# Patient Record
Sex: FEMALE | Race: WHITE | NOT HISPANIC OR LATINO | Employment: FULL TIME | URBAN - METROPOLITAN AREA
[De-identification: names, ages, dates, MRNs, and addresses within clinical notes are randomized per-mention and may not be internally consistent; named-entity substitution may affect disease eponyms.]

---

## 2017-05-22 ENCOUNTER — GENERIC CONVERSION - ENCOUNTER (OUTPATIENT)
Dept: OTHER | Facility: OTHER | Age: 57
End: 2017-05-22

## 2017-08-08 ENCOUNTER — ALLSCRIPTS OFFICE VISIT (OUTPATIENT)
Dept: OTHER | Facility: OTHER | Age: 57
End: 2017-08-08

## 2017-08-08 DIAGNOSIS — R53.83 OTHER FATIGUE: ICD-10-CM

## 2017-08-08 DIAGNOSIS — E78.5 HYPERLIPIDEMIA: ICD-10-CM

## 2018-01-09 NOTE — PROGRESS NOTES
Assessment   1  Allergic rhinitis (477 9) (J30 9)  2  Auditory impairment, bilateral (389 9) (H91 93)  3  Hyperlipidemia (272 4) (E78 5)  4  Fatigue (780 79) (R53 83)  5  Need for hepatitis C screening test (V73 89) (Z11 59)  6  Encounter for preventive health examination (V70 0) (Z00 00)1      1 Amended By: Marv Warner; Aug 08 2017 4:05 PM EST    Plan  Colon cancer screening    · COLONOSCOPY; Status:Complete - Retrospective By Protocol Authorization;   Done:  [id number] 12:00AM  Fatigue    · (1) CBC/PLT/DIFF; Status:Active; Requested for:73Drj5322;    · (1) TSH WITH FT4 REFLEX; Status:Active; Requested for:90Ras0997;   Hyperlipidemia    · (1) COMPREHENSIVE METABOLIC PANEL; Status:Active; Requested for:36Mzr8076;    · (1) LIPID PANEL, FASTING; Status:Active; Requested for:92Nus6667;   Need for hepatitis C screening test    · (Q) HEPATITIS C ANTIBODY; Status:Active; Requested for:27Tiy9312;     Discussion/Summary  health maintenance visit Currently, she eats a healthy diet and has an adequate exercise regimen  cervical cancer screening is current Breast cancer screening: mammogram is current  Colorectal cancer screening: colorectal cancer screening is current  Advice and education were given regarding nutrition and aerobic exercise  Hepatitis C Screening:  The patient agrees to Hepatitis C screening  Dolores Cotto is here for health maintenance visit  She has been doing very well in spite of multiple stresses in her life over the past 2 years  She manages a healthy diet and does some walking for exercise  We encouraged increased exercise and addition of muscle strengthening and flexibility  Regarding allergies, she continues with allergy injections and uses Allegra and Flonase  She has some hearing loss and was diagnosed with low frequency hearing loss a few years ago   She will let us know if she would like a referral for additional hearing test   I have given her a lab slip for cholesterol panel and comprehensive metabolic profile as well as CBC and TSH  We will also check a hep C screen  We'll plan continued yearly visits  Chief Complaint  YEARLY PHYSICAL      History of Present Illness  HM, Adult Female: The patient is being seen for a health maintenance evaluation  The last health maintenance visit was several year(s) ago  General Health: The patient's health since the last visit is described as good  She has regular dental visits  She denies vision problems  She has hearing loss  Immunizations status:  notes decreased  Lifestyle:  She consumes a diverse and healthy diet  She has weight concerns  She exercises regularly  She does not use tobacco  She denies alcohol use  She denies drug use  Reproductive health: the patient is postmenopausal    Screening:   HPI: Pt is here for physical exam   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  She had to fire employees due to [place]  She feels exhausted  last night she was watching her daughters dogs who had overdosed on vitamin D  Her 25 yr old daughter had major scoliosis surgery 2 yrs ago and she cared for her as well  Then she got very sick with virus and needed a feeding tube  allergy shots q 4 weeks  Review of Systems    Constitutional: No fever, no chills, feels well, no tiredness, no recent weight gain or weight loss  Eyes: No complaints of eye pain, no red eyes, no eyesight problems, no discharge, no dry eyes, no itching of eyes  ENT: no complaints of earache, no loss of hearing, no nose bleeds, no nasal discharge, no sore throat, no hoarseness  Cardiovascular: No complaints of slow heart rate, no fast heart rate, no chest pain, no palpitations, no leg claudication, no lower extremity edema  Respiratory: No complaints of shortness of breath, no wheezing, no cough, no SOB on exertion, no orthopnea, no PND     Gastrointestinal: No complaints of abdominal pain, no constipation, no nausea or vomiting, no diarrhea, no bloody stools  Musculoskeletal: No complaints of arthralgias, no myalgias, no joint swelling or stiffness, no limb pain or swelling  Integumentary: No complaints of skin rash or lesions, no itching, no skin wounds, no breast pain or lump  Neurological: No complaints of headache, no confusion, no convulsions, no numbness, no dizziness or fainting, no tingling, no limb weakness, no difficulty walking  Psychiatric: Not suicidal, no sleep disturbance, no anxiety or depression, no change in personality, no emotional problems  Endocrine: No complaints of proptosis, no hot flashes, no muscle weakness, no deepening of the voice, no feelings of weakness  Active Problems   1  Allergic rhinitis (477 9) (J30 9)  2  Auditory impairment, bilateral (389 9) (H91 93)  3  Chronic constipation (564 00) (K59 09)  4  Dysphagia (787 20) (R13 10)  5  Fatigue (780 79) (R53 83)  6  Hyperlipidemia (272 4) (E78 5)  7  Nephrolithiasis (592 0) (N20 0)  8  Overweight (278 02) (E66 3)  9  Posterior tibial tendonitis (726 72) (M76 829)  10  Preoperative clearance (V72 84) (Z01 818)    Past Medical History    · History of sinusitis (V12 69) (Z87 09)   · History of urinary tract infection (V13 02) (Z87 440)   · History of vertigo (V12 49) (Z87 898)   · History of viral infection (V12 09) (Z86 19)   · History of Left arm pain (729 5) (M79 602)    Surgical History    · History of Hysterectomy   · History of Total Abdominal Hysterectomy With Removal Of Both Ovaries    Family History  Mother    · Family history of Multiple myeloma  Father    · Family history of Diabetes mellitus   · Family history of Melanoma   · Family history of Prostate cancer  Brother    · Family history of Diabetes mellitus   · Family history of HTN (hypertension)    Social History    · Being A Social Drinker   · Never A Smoker   · No drug use    Current Meds  1   Allegra-D Allergy & Congestion  MG Oral Tablet Extended Release 12 Hour; TAKE   1 TABLET EVERY MORNING; Therapy: 84BJK0861 to (Evaluate:05Jun2013) Recorded  2  Fluticasone Propionate 50 MCG/ACT Nasal Suspension; USE 2 SPRAYS IN EACH   NOSTRIL ONCE DAILY; Therapy: 45AMA5213 to (Last Rx:12Mar2012)  Requested for: 12Mar2012 Ordered  3  Menostar 14 MCG/24HR Transdermal Patch Weekly; Therapy: 11LVX2255 to (Last Rx:09Jun2011)  Requested for: 84NEP5570 Ordered  4  ZyrTEC Allergy 10 MG Oral Tablet; TAKE 1 TABLET AT BEDTIME; Therapy: 95YWO6894 to (Evaluate:06Apr2013); Last Rx:07Mar2013 Ordered    Allergies   1  Sulfa Drugs    Vitals   Recorded: 23NUU8058 24:11QZ   Systolic 407   Diastolic 72   Height 5 ft 7 in   Weight 170 lb 4 oz   BMI Calculated 26 66   BSA Calculated 1 89     Physical Exam    Constitutional   General appearance: No acute distress, well appearing and well nourished  Head and Face   Head and face: Normal     Palpation of the face and sinuses: No sinus tenderness  Eyes   Conjunctiva and lids: No swelling, erythema or discharge  Pupils and irises: Equal, round, reactive to light  Ears, Nose, Mouth, and Throat   External inspection of ears and nose: Normal     Otoscopic examination: Tympanic membranes translucent with normal light reflex  Canals patent without erythema  Hearing: Normal     Lips, teeth, and gums: Normal, good dentition  Oropharynx: Normal with no erythema, edema, exudate or lesions  Neck   Neck: Supple, symmetric, trachea midline, no masses  Thyroid: Normal, no thyromegaly  Pulmonary   Respiratory effort: No increased work of breathing or signs of respiratory distress  Auscultation of lungs: Clear to auscultation  Cardiovascular   Auscultation of heart: Normal rate and rhythm, normal S1 and S2, no murmurs  Carotid pulses: 2+ bilaterally  Examination of extremities for edema and/or varicosities: Normal     Abdomen   Abdomen: Non-tender, no masses  Liver and spleen: No hepatomegaly or splenomegaly     Lymphatic   Palpation of lymph nodes in neck: No lymphadenopathy  Palpation of lymph nodes in other areas: No lymphadenopathy  Musculoskeletal   Gait and station: Normal     Digits and nails: Normal without clubbing or cyanosis  Skin   Skin and subcutaneous tissue: Normal without rashes or lesions  Neurologic   Cranial nerves: Cranial nerves II-XII intact  Reflexes: 2+ and symmetric  Results/Data  PHQ-2 Adult Depression Screening 40Hoo6619 09:36AM User, Ahs     Test Name Result Flag Reference   PHQ-2 Adult Depression Score 0     Over the last two weeks, how often have you been bothered by any of the following problems?   Little interest or pleasure in doing things: Not at all - 0  Feeling down, depressed, or hopeless: Not at all - 0   PHQ-2 Adult Depression Screening Negative       COLONOSCOPY [id number] 12:00AM Susan Marley     Test Name Result Flag Reference   Colonoscopy 02/21/2014 normal       Summary / No summary entered :      No summary entered  Documents attached :      sColonoscopies - Brandie Couch; Enc: [id number] - Image Encounter - [facility]) (Result Document)    Future Appointments    Date/Time Provider Specialty Site   09/06/2018 09:20 AM Susan Marley MD Family Medicine [facility]   Electronically signed by : Melodie Miguel MD; Aug  8 2017  4:05PM EST                       (Author)

## 2018-01-14 VITALS
SYSTOLIC BLOOD PRESSURE: 136 MMHG | DIASTOLIC BLOOD PRESSURE: 72 MMHG | HEIGHT: 67 IN | BODY MASS INDEX: 26.72 KG/M2 | WEIGHT: 170.25 LBS

## 2018-06-19 RX ORDER — FEXOFENADINE HCL AND PSEUDOEPHEDRINE HCI 60; 120 MG/1; MG/1
1 TABLET, EXTENDED RELEASE ORAL DAILY
COMMUNITY
Start: 2013-03-07

## 2018-06-19 RX ORDER — FLUTICASONE PROPIONATE 50 MCG
2 SPRAY, SUSPENSION (ML) NASAL DAILY
COMMUNITY
Start: 2012-03-12

## 2018-09-06 ENCOUNTER — OFFICE VISIT (OUTPATIENT)
Dept: FAMILY MEDICINE CLINIC | Facility: CLINIC | Age: 58
End: 2018-09-06
Payer: COMMERCIAL

## 2018-09-06 VITALS
WEIGHT: 160 LBS | TEMPERATURE: 97.6 F | HEIGHT: 67 IN | SYSTOLIC BLOOD PRESSURE: 114 MMHG | DIASTOLIC BLOOD PRESSURE: 78 MMHG | BODY MASS INDEX: 25.11 KG/M2 | OXYGEN SATURATION: 99 % | HEART RATE: 76 BPM

## 2018-09-06 DIAGNOSIS — Z13.220 SCREENING FOR HYPERLIPIDEMIA: ICD-10-CM

## 2018-09-06 DIAGNOSIS — M25.551 HIP PAIN, ACUTE, RIGHT: ICD-10-CM

## 2018-09-06 DIAGNOSIS — Z23 NEED FOR VACCINATION FOR H FLU TYPE B: ICD-10-CM

## 2018-09-06 DIAGNOSIS — Z00.00 ENCOUNTER FOR HEALTH MAINTENANCE EXAMINATION IN ADULT: Primary | ICD-10-CM

## 2018-09-06 DIAGNOSIS — H91.93 AUDITORY IMPAIRMENT, BILATERAL: ICD-10-CM

## 2018-09-06 PROCEDURE — 90682 RIV4 VACC RECOMBINANT DNA IM: CPT

## 2018-09-06 PROCEDURE — 99396 PREV VISIT EST AGE 40-64: CPT | Performed by: FAMILY MEDICINE

## 2018-09-06 NOTE — PROGRESS NOTES
Assessment/Plan:    Normal  physical exam   She is up-to-date with health screening measures  We will check fasting lipid profile and CMP  She has had some right low back and hip pain over the past 2 weeks which appears to be musculoskeletal   Recommended gentle stretching and she should contact us if symptoms are not improving  Auditory impairment, bilateral  Gave ENT referral       Diagnoses and all orders for this visit:    Encounter for health maintenance examination in adult    Auditory impairment, bilateral  -     Ambulatory Referral to Otolaryngology; Future    Screening for hyperlipidemia  -     Lipid panel; Future  -     Comprehensive metabolic panel; Future    Hip pain, acute, right    Need for vaccination for H flu type B  -     influenza vaccine, [id number], quadrivalent, recombinant, PF, 0 5 mL, for patients 18 yr+ (FLUBLOK)          Subjective:      Patient ID: Tank Mixon is a 66 y o  female  She is here for  physical exam   She is eating a healthy diet, normal bowels  Weight is the same as 2016  She had gained some weight but then lost it again as she increased her exercise  Walking for exercise  Notes right low back/hip pain past 2 weeks  She denies injury but she helped her daughter move past weekend and she thinks she aggravated it  UTD with colonoscopy 5 years ago  Gyn and mamm was done in May  UTD with dentist  Allergies are under control  She gets monthly allergy shots  Her father  January of met prostate ca        The following portions of the patient's history were reviewed and updated as appropriate: allergies, current medications, past family history, past medical history, past social history, past surgical history and problem list     Review of Systems   Constitutional: Negative for activity change, chills, fatigue and fever  HENT: Negative for congestion, ear pain, sinus pressure and sore throat  Eyes: Negative for pain and visual disturbance  Respiratory: Negative for cough, chest tightness, shortness of breath and wheezing  Cardiovascular: Negative for chest pain, palpitations and leg swelling  Gastrointestinal: Negative for abdominal pain, blood in stool, constipation, diarrhea, nausea and vomiting  Endocrine: Negative for polydipsia and polyuria  Genitourinary: Negative for difficulty urinating, dysuria, frequency and urgency  Musculoskeletal: Positive for arthralgias  Negative for joint swelling and myalgias  Skin: Negative for rash  Neurological: Negative for dizziness, weakness, numbness and headaches  Hematological: Negative for adenopathy  Does not bruise/bleed easily  Psychiatric/Behavioral: Negative for dysphoric mood  The patient is not nervous/anxious  Objective:      /78 (BP Location: Left arm, Patient Position: Sitting, Cuff Size: Standard)   Pulse 76   Temp 97 6 °F (36 4 °C) (Tympanic)   Ht 5' 6 5" (1 689 m)   Wt 72 6 kg (160 lb)   SpO2 99%   BMI 25 44 kg/m²          Physical Exam   Nursing note and vitals reviewed

## 2018-12-27 DIAGNOSIS — J01.00 ACUTE NON-RECURRENT MAXILLARY SINUSITIS: Primary | ICD-10-CM

## 2018-12-27 RX ORDER — AMOXICILLIN AND CLAVULANATE POTASSIUM 875; 125 MG/1; MG/1
1 TABLET, FILM COATED ORAL EVERY 12 HOURS SCHEDULED
Qty: 20 TABLET | Refills: 0
Start: 2018-12-27 | End: 2019-01-06

## 2018-12-27 NOTE — TELEPHONE ENCOUNTER
Patient is calling today from [place]  She is suffering from a really bad sinus infection - she has a sore throat, congestion which is causing pressure in her ears, drainage, cough, no fever tho  She went to a walk-in urgent care in [place] and they gave her 500mg of Amoxicillian 3x a day, she said it helped but it did not get rid of it she is still suffering and wanted to know if you would be able to call in another antibiotic for her  If so, can you please send to to the [facility] on [address] [address] [phone]

## 2018-12-27 NOTE — TELEPHONE ENCOUNTER
Augmentin 875 BID x 10 days #20 no refills  Please let her know that amox is a good choice but may take time - augmentin is a bit stronger

## 2019-07-29 LAB — HBA1C MFR BLD HPLC: 5.2 %

## 2019-08-20 LAB
LEFT EYE DIABETIC RETINOPATHY: NORMAL
RIGHT EYE DIABETIC RETINOPATHY: NORMAL

## 2019-08-22 ENCOUNTER — TELEPHONE (OUTPATIENT)
Dept: FAMILY MEDICINE CLINIC | Facility: CLINIC | Age: 59
End: 2019-08-22

## 2019-08-22 NOTE — TELEPHONE ENCOUNTER
Pt called asking if she needs labs done before appt on 9/10/19    Pt also has concerns that she may be prediabetic  She recently had a BS test, 3 hour BS test, where she claims her results were as follows:   after 1 hour         240 after 2 hours        140 after 3 hours  Pt has Hx of diabetes in family, dad and two brothers have diabetes  Pt has changed diet and is exercising more as well, and has lost 21 lbs

## 2019-09-09 LAB
HEPATITIS C ANTIBODY (HISTORICAL): NEGATIVE
HEPATITIS C ANTIBODY CONFIRMATION (HISTORICAL): NEGATIVE

## 2019-09-10 ENCOUNTER — OFFICE VISIT (OUTPATIENT)
Dept: FAMILY MEDICINE CLINIC | Facility: CLINIC | Age: 59
End: 2019-09-10
Payer: COMMERCIAL

## 2019-09-10 VITALS
BODY MASS INDEX: 22.6 KG/M2 | HEIGHT: 67 IN | DIASTOLIC BLOOD PRESSURE: 78 MMHG | OXYGEN SATURATION: 97 % | TEMPERATURE: 97.3 F | HEART RATE: 92 BPM | WEIGHT: 144 LBS | SYSTOLIC BLOOD PRESSURE: 120 MMHG

## 2019-09-10 DIAGNOSIS — Z00.00 ENCOUNTER FOR HEALTH MAINTENANCE EXAMINATION IN ADULT: ICD-10-CM

## 2019-09-10 DIAGNOSIS — J30.9 ALLERGIC RHINITIS, UNSPECIFIED SEASONALITY, UNSPECIFIED TRIGGER: ICD-10-CM

## 2019-09-10 DIAGNOSIS — Z00.00 ANNUAL PHYSICAL EXAM: Primary | ICD-10-CM

## 2019-09-10 PROCEDURE — 99396 PREV VISIT EST AGE 40-64: CPT | Performed by: FAMILY MEDICINE

## 2019-09-10 NOTE — PATIENT INSTRUCTIONS

## 2019-09-10 NOTE — PROGRESS NOTES
ADULT [facility]AL [facility]    NAME: Chapincito Saldivar  AGE: 64 y o  SEX: female  : 1960     DATE: 9/10/2019     Assessment and Plan:     Problem List Items Addressed This Visit        Respiratory    Allergic rhinitis     Well controlled on current regimen           Other Visit Diagnoses     Encounter for health maintenance examination in adult    -  Primary          Immunizations and preventive care screenings were discussed with patient today  Appropriate education was printed on patient's after visit summary  Counseling:  · Dental Health: discussed importance of regular tooth brushing, flossing, and dental visits  No follow-ups on file  Chief Complaint:     Chief Complaint   Patient presents with    Annual Exam      History of Present Illness:     Adult Annual Physical   Patient here for a comprehensive physical exam  The patient reports problems - She has strong family history of diabetes  Thankfully her hemoglobin A1c was normal at 5 2 because she made massive improvements with her diet and exercise routine  She also lost 16 lb  She was having some vision problems on the left  Her eye doctor did diabetic retinal exam and everything is stable  She will continue to monitor       Diet and Physical Activity  · Diet/Nutrition: well balanced diet, consuming 3-5 servings of fruits/vegetables daily and adequate fiber intake  · Exercise: walking and 5-7 times a week on average  Depression Screening  PHQ-9 Depression Screening    PHQ-9:    Frequency of the following problems over the past two weeks:       Little interest or pleasure in doing things:  0 - not at all  Feeling down, depressed, or hopeless:  0 - not at all  PHQ-2 Score:  0       General Health  · Sleep: sleeps well and gets 4-6 hours of sleep on average     · Hearing: normal - bilateral   · Vision: goes for regular eye exams, most recent eye exam <1 year ago and wears glasses  · Dental: regular dental visits, brushes teeth twice daily and flosses teeth occasionally  /GYN Health  · Patient is: postmenopausal  · Last menstrual period: 2006  · Contraceptive method: none  Review of Systems:     Review of Systems   Constitutional: Negative for activity change, chills, fatigue and fever  HENT: Positive for congestion  Negative for ear pain, sinus pressure and sore throat  Eyes: Negative for pain and visual disturbance  Respiratory: Negative for cough, chest tightness, shortness of breath and wheezing  Cardiovascular: Negative for chest pain, palpitations and leg swelling  Gastrointestinal: Negative for abdominal pain, blood in stool, constipation, diarrhea, nausea and vomiting  Endocrine: Negative for polydipsia and polyuria  Genitourinary: Positive for urgency  Negative for difficulty urinating, dysuria and frequency  Has urogyn appt, had uterine prolapse surgery 2006  With weight gain now weight loss, having some sx again  She is scheduled for follow up   Musculoskeletal: Negative for arthralgias, joint swelling and myalgias  Skin: Negative for rash  Allergic/Immunologic: Positive for environmental allergies  Neurological: Negative for dizziness, weakness, numbness and headaches  Hematological: Negative for adenopathy  Does not bruise/bleed easily  Psychiatric/Behavioral: Negative for dysphoric mood  The patient is not nervous/anxious         Past Medical History:     Past Medical History:   Diagnosis Date    Allergic rhinitis     Vertigo     LAST ASSESSED 3/8/13; RESOLVED 11/10/15      Past Surgical History:     Past Surgical History:   Procedure Laterality Date    ABDOMINAL SURGERY      HYSTERECTOMY  2006    TOTAL ABDOMINAL HYSTERECTOMY  2006    WITH REMOVAL OF BOTH OVARIES       Social History:     Social History     Socioeconomic History    Marital status: /Civil Union     Spouse name: None    Number of children: None    Years of education: None    Highest education level: None   Occupational History    None   Social Needs    Financial resource strain: None    Food insecurity:     Worry: None     Inability: None    Transportation needs:     Medical: None     Non-medical: None   Tobacco Use    Smoking status: Never Smoker    Smokeless tobacco: Never Used   Substance and Sexual Activity    Alcohol use: No     Comment: SOCIAL DRINKER PER [employer]    Drug use: No    Sexual activity: None   Lifestyle    Physical activity:     Days per week: None     Minutes per session: None    Stress: None   Relationships    Social connections:     Talks on phone: None     Gets together: None     Attends Hoahaoism service: None     Active member of club or organization: None     Attends meetings of clubs or organizations: None     Relationship status: None    Intimate partner violence:     Fear of current or ex partner: None     Emotionally abused: None     Physically abused: None     Forced sexual activity: None   Other Topics Concern    None   Social History Narrative    None      Family History:     Family History   Problem Relation Age of Onset    Other Mother         MULTIPLE MYELOMA    Diabetes Father     Melanoma Father     Prostate cancer Father     Diabetes Brother     Hypertension Brother       Current Medications:     Current Outpatient Medications   Medication Sig Dispense Refill    Cetirizine HCl (ZYRTEC ALLERGY) 10 MG CAPS Take 1 tablet by mouth daily at bedtime       estradiol (MENOSTAR) 14 MCG/24HR PLACE 1 PATCH ON THE SKIN ONCE WEEKLY      fexofenadine-pseudoephedrine (ALLEGRA-D)  MG per tablet Take 1 tablet by mouth daily       fluticasone (FLONASE) 50 mcg/act nasal spray 2 sprays into each nostril daily       No current facility-administered medications for this visit  Allergies:      Allergies   Allergen Reactions    Sulfa Antibiotics     Sulfasalazine       Physical Exam: /78 (BP Location: Left arm, Patient Position: Sitting, Cuff Size: Standard)   Pulse 92   Temp (!) 97 3 °F (36 3 °C) (Tympanic)   Ht 5' 6 5" (1 689 m)   Wt 65 3 kg (144 lb)   SpO2 97%   BMI 22 89 kg/m²     Physical Exam   Constitutional: She is oriented to person, place, and time  She appears well-developed and well-nourished  No distress  HENT:   Head: Normocephalic and atraumatic  Right Ear: External ear normal    Left Ear: External ear normal    Nose: Nose normal    Mouth/Throat: Oropharynx is clear and moist    Eyes: Pupils are equal, round, and reactive to light  Conjunctivae and EOM are normal  No scleral icterus  Neck: Normal range of motion  Neck supple  No thyromegaly present  Cardiovascular: Normal rate, regular rhythm, normal heart sounds and intact distal pulses  No murmur heard  Pulmonary/Chest: Effort normal and breath sounds normal  She has no wheezes  She has no rales  Abdominal: Soft  Bowel sounds are normal  She exhibits no mass  There is no tenderness  Musculoskeletal: Normal range of motion  She exhibits no edema, tenderness or deformity  Lymphadenopathy:     She has no cervical adenopathy  Neurological: She is alert and oriented to person, place, and time  She has normal reflexes  She displays normal reflexes  No cranial nerve deficit or sensory deficit  She exhibits normal muscle tone  Skin: Skin is warm and dry  Capillary refill takes less than 2 seconds  No rash noted  No erythema  Psychiatric: She has a normal mood and affect  Her behavior is normal    Nursing note and vitals reviewed        MD Mary Beth Martinez [phone]

## 2019-12-15 ENCOUNTER — TELEPHONE (OUTPATIENT)
Dept: OTHER | Facility: OTHER | Age: 59
End: 2019-12-15

## 2019-12-15 NOTE — TELEPHONE ENCOUNTER
PT  Called in stating she would like to have home care, she was seen in ER on Friday  PT would like a call back from the office

## 2019-12-16 ENCOUNTER — TRANSITIONAL CARE MANAGEMENT (OUTPATIENT)
Dept: FAMILY MEDICINE CLINIC | Facility: CLINIC | Age: 59
End: 2019-12-16

## 2019-12-17 ENCOUNTER — OFFICE VISIT (OUTPATIENT)
Dept: FAMILY MEDICINE CLINIC | Facility: CLINIC | Age: 59
End: 2019-12-17
Payer: COMMERCIAL

## 2019-12-17 VITALS
BODY MASS INDEX: 22.57 KG/M2 | HEART RATE: 101 BPM | TEMPERATURE: 96.1 F | OXYGEN SATURATION: 99 % | WEIGHT: 143.8 LBS | DIASTOLIC BLOOD PRESSURE: 70 MMHG | SYSTOLIC BLOOD PRESSURE: 112 MMHG | HEIGHT: 67 IN

## 2019-12-17 DIAGNOSIS — S32.511A CLOSED FRACTURE OF SUPERIOR RAMUS OF RIGHT PUBIS, INITIAL ENCOUNTER (HCC): ICD-10-CM

## 2019-12-17 DIAGNOSIS — S32.591A CLOSED FRACTURE OF RIGHT INFERIOR PUBIC RAMUS, INITIAL ENCOUNTER (HCC): Primary | ICD-10-CM

## 2019-12-17 DIAGNOSIS — L30.9 DERMATITIS: ICD-10-CM

## 2019-12-17 DIAGNOSIS — Z98.890 HISTORY OF PELVIC SURGERY: ICD-10-CM

## 2019-12-17 PROCEDURE — 99496 TRANSJ CARE MGMT HIGH F2F 7D: CPT | Performed by: FAMILY MEDICINE

## 2019-12-17 RX ORDER — IBUPROFEN 600 MG/1
600 TABLET ORAL EVERY 6 HOURS PRN
COMMUNITY
Start: 2019-12-12 | End: 2020-09-15 | Stop reason: ALTCHOICE

## 2019-12-17 RX ORDER — OXYCODONE HYDROCHLORIDE 5 MG/1
5-10 CAPSULE ORAL EVERY 6 HOURS PRN
COMMUNITY
Start: 2019-12-12 | End: 2019-12-17 | Stop reason: SDUPTHER

## 2019-12-17 RX ORDER — OXYCODONE HYDROCHLORIDE 5 MG/1
5-10 CAPSULE ORAL EVERY 6 HOURS PRN
Qty: 10 CAPSULE | Refills: 0 | Status: SHIPPED | OUTPATIENT
Start: 2019-12-17 | End: 2020-09-15 | Stop reason: ALTCHOICE

## 2019-12-17 RX ORDER — ACETAMINOPHEN 500 MG
1000 TABLET ORAL EVERY 6 HOURS PRN
COMMUNITY
Start: 2019-12-12 | End: 2019-12-23

## 2019-12-17 RX ORDER — CEPHALEXIN 500 MG/1
500 CAPSULE ORAL 4 TIMES DAILY
COMMUNITY
Start: 2019-12-12 | End: 2019-12-17

## 2019-12-17 RX ORDER — METHOCARBAMOL 500 MG/1
500 TABLET, FILM COATED ORAL 4 TIMES DAILY
COMMUNITY
Start: 2019-12-12 | End: 2019-12-17 | Stop reason: SDUPTHER

## 2019-12-17 RX ORDER — METHOCARBAMOL 500 MG/1
500 TABLET, FILM COATED ORAL 4 TIMES DAILY
Qty: 40 TABLET | Refills: 0 | Status: SHIPPED | OUTPATIENT
Start: 2019-12-17 | End: 2020-09-15 | Stop reason: ALTCHOICE
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
## 2019-12-17 NOTE — ASSESSMENT & PLAN NOTE
Gave her referral to [facility], as she does not want to return to the Orthopedic Trauma surgeon that she had  Also gave referral to physical therapy

## 2019-12-17 NOTE — ASSESSMENT & PLAN NOTE
Since script for hydrocortisone 2 5 percent cream to her pharmacy  This can be applied up to t i d  As needed for itching

## 2019-12-17 NOTE — ASSESSMENT & PLAN NOTE
Gave her referral to [facility], as she does not want to return to the Orthopedic Trauma surgeon that she had  Also gave referral to physical therapy  We discussed pain control and she is doing okay with to oxycodone at bedtime only  I renewed medication for small number and urged her to cut back to 1 at bedtime as she improves  Also renewed her Robaxin as this has been very helpful for her during the day

## 2019-12-23 ENCOUNTER — OFFICE VISIT (OUTPATIENT)
Dept: FAMILY MEDICINE CLINIC | Facility: CLINIC | Age: 59
End: 2019-12-23
Payer: COMMERCIAL

## 2019-12-23 VITALS
DIASTOLIC BLOOD PRESSURE: 74 MMHG | WEIGHT: 138.8 LBS | HEIGHT: 67 IN | BODY MASS INDEX: 21.79 KG/M2 | OXYGEN SATURATION: 98 % | RESPIRATION RATE: 18 BRPM | HEART RATE: 90 BPM | TEMPERATURE: 98.6 F | SYSTOLIC BLOOD PRESSURE: 118 MMHG

## 2019-12-23 DIAGNOSIS — R31.9 HEMATURIA, UNSPECIFIED TYPE: Primary | ICD-10-CM

## 2019-12-23 LAB
SL AMB  POCT GLUCOSE, UA: NEGATIVE
SL AMB LEUKOCYTE ESTERASE,UA: NEGATIVE
SL AMB POCT BILIRUBIN,UA: ABNORMAL
SL AMB POCT BLOOD,UA: ABNORMAL
SL AMB POCT CLARITY,UA: ABNORMAL
SL AMB POCT COLOR,UA: ABNORMAL
SL AMB POCT KETONES,UA: NEGATIVE
SL AMB POCT NITRITE,UA: NEGATIVE
SL AMB POCT PH,UA: 6
SL AMB POCT SPECIFIC GRAVITY,UA: 1.02
SL AMB POCT URINE PROTEIN: 30
SL AMB POCT UROBILINOGEN: 0.2

## 2019-12-23 PROCEDURE — 87086 URINE CULTURE/COLONY COUNT: CPT | Performed by: INTERNAL MEDICINE

## 2019-12-23 PROCEDURE — 81002 URINALYSIS NONAUTO W/O SCOPE: CPT | Performed by: INTERNAL MEDICINE

## 2019-12-23 PROCEDURE — 3008F BODY MASS INDEX DOCD: CPT | Performed by: INTERNAL MEDICINE

## 2019-12-23 PROCEDURE — 99213 OFFICE O/P EST LOW 20 MIN: CPT | Performed by: INTERNAL MEDICINE

## 2019-12-23 PROCEDURE — 1036F TOBACCO NON-USER: CPT | Performed by: INTERNAL MEDICINE

## 2019-12-23 RX ORDER — CIPROFLOXACIN 250 MG/1
250 TABLET, FILM COATED ORAL EVERY 12 HOURS SCHEDULED
Qty: 6 TABLET | Refills: 0 | Status: SHIPPED | OUTPATIENT
Start: 2019-12-23 | End: 2019-12-26
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
## 2019-12-24 LAB — BACTERIA UR CULT: NORMAL

## 2020-09-15 ENCOUNTER — TELEPHONE (OUTPATIENT)
Dept: ADMINISTRATIVE | Facility: OTHER | Age: 60
End: 2020-09-15

## 2020-09-15 ENCOUNTER — OFFICE VISIT (OUTPATIENT)
Dept: FAMILY MEDICINE CLINIC | Facility: CLINIC | Age: 60
End: 2020-09-15
Payer: COMMERCIAL

## 2020-09-15 VITALS
OXYGEN SATURATION: 99 % | WEIGHT: 140 LBS | DIASTOLIC BLOOD PRESSURE: 80 MMHG | TEMPERATURE: 97.6 F | BODY MASS INDEX: 21.97 KG/M2 | HEIGHT: 67 IN | HEART RATE: 80 BPM | SYSTOLIC BLOOD PRESSURE: 128 MMHG | RESPIRATION RATE: 18 BRPM

## 2020-09-15 DIAGNOSIS — R73.9 HYPERGLYCEMIA: ICD-10-CM

## 2020-09-15 DIAGNOSIS — J30.9 ALLERGIC RHINITIS, UNSPECIFIED SEASONALITY, UNSPECIFIED TRIGGER: ICD-10-CM

## 2020-09-15 DIAGNOSIS — Z00.00 ANNUAL PHYSICAL EXAM: Primary | ICD-10-CM

## 2020-09-15 DIAGNOSIS — H91.93 BILATERAL HEARING LOSS, UNSPECIFIED HEARING LOSS TYPE: ICD-10-CM

## 2020-09-15 DIAGNOSIS — Z11.4 SCREENING FOR HIV (HUMAN IMMUNODEFICIENCY VIRUS): ICD-10-CM

## 2020-09-15 DIAGNOSIS — Z23 NEED FOR TDAP VACCINATION: ICD-10-CM

## 2020-09-15 DIAGNOSIS — E78.5 HYPERLIPIDEMIA, UNSPECIFIED HYPERLIPIDEMIA TYPE: ICD-10-CM

## 2020-09-15 PROCEDURE — 1036F TOBACCO NON-USER: CPT | Performed by: FAMILY MEDICINE

## 2020-09-15 PROCEDURE — 3725F SCREEN DEPRESSION PERFORMED: CPT | Performed by: FAMILY MEDICINE

## 2020-09-15 PROCEDURE — 90715 TDAP VACCINE 7 YRS/> IM: CPT

## 2020-09-15 PROCEDURE — 99396 PREV VISIT EST AGE 40-64: CPT | Performed by: FAMILY MEDICINE

## 2020-09-15 PROCEDURE — 90471 IMMUNIZATION ADMIN: CPT

## 2020-09-15 NOTE — TELEPHONE ENCOUNTER
Upon review of the In Basket request we were able to locate, review, and update the patient chart as requested for Mammogram     Any additional questions or concerns should be emailed to the Practice Liaisons via [email or web address] email, please do not reply via In Basket      Thank you  Hernando Ocampo

## 2020-09-15 NOTE — TELEPHONE ENCOUNTER
----- Message from Lili Waddell sent at 9/15/2020 10:24 AM EDT -----  Regarding: Mammo/ [facility]  09/15/20 10:24 AM    Julien, our patient Tia Natalie has had Mammogram completed/performed  Please assist in updating the patient chart by pulling the Care Everywhere (CE) document  The date of service is 06/22/2020       Thank you,  Leela Rodriguez MA  [facility]

## 2020-09-15 NOTE — PROGRESS NOTES
[address] PRIMARY CARE    NAME: Blanca Portillo  AGE: 64 y o  SEX: female  : 1960     DATE: 9/15/2020     Assessment and Plan:     Problem List Items Addressed This Visit        Respiratory    Allergic rhinitis     She suffers from seasonal allergies in goes for allergy shots every 2-4 weeks  Nervous and Auditory    Bilateral hearing loss     Gave referral to ENT         Relevant Orders    Ambulatory Referral to Otolaryngology      Other Visit Diagnoses     Annual physical exam    -  Primary    Hyperlipidemia, unspecified hyperlipidemia type        Relevant Orders    Lipid panel    Comprehensive metabolic panel    Hyperglycemia        Relevant Orders    HEMOGLOBIN A1C W/ EAG ESTIMATION    Screening for HIV (human immunodeficiency virus)        Relevant Orders    HIV 1/2 Antigen/Antibody (4th Generation) w Reflex [facility]    Need for Tdap vaccination        Relevant Orders    TDAP VACCINE GREATER THAN OR EQUAL TO 8YO IM (Completed)          Immunizations and preventive care screenings were discussed with patient today  Appropriate education was printed on patient's after visit summary  Counseling:  · Exercise: the importance of regular exercise/physical activity was discussed  Recommend exercise 3-5 times per week for at least 30 minutes  Return in about 1 year (around 9/15/2021) for Annual physical      Chief Complaint:     Chief Complaint   Patient presents with    Annual Exam      History of Present Illness:     Adult Annual Physical   Patient here for a comprehensive physical exam  The patient reports no problems  Diet and Physical Activity  · Diet/Nutrition: well balanced diet and consuming 3-5 servings of fruits/vegetables daily  · Exercise: walking and 5-7 times a week on average        Depression Screening  PHQ-9 Depression Screening    PHQ-9:    Frequency of the following problems over the past two weeks: Little interest or pleasure in doing things:  0 - not at all  Feeling down, depressed, or hopeless:  0 - not at all  PHQ-2 Score:  0       General Health  · Sleep: gets 7-8 hours of sleep on average and sometimes has difficulty falling asleep  · Hearing: decreased - bilateral   · Vision: most recent eye exam <1 year ago and wears glasses  · Dental: no dental visits for >1 year, brushes teeth twice daily and flosses teeth occasionally  /GYN Health  · Patient is: postmenopausal  · Last menstrual period: 2006  · Contraceptive method: none  Review of Systems:     Review of Systems   Constitutional: Negative for activity change, chills, fatigue and fever  HENT: Positive for hearing loss  Negative for congestion, ear pain, sinus pressure and sore throat  Eyes: Negative for pain and visual disturbance  Respiratory: Negative for cough, chest tightness, shortness of breath and wheezing  Cardiovascular: Negative for chest pain, palpitations and leg swelling  Gastrointestinal: Negative for abdominal pain, blood in stool, constipation, diarrhea, nausea and vomiting  Endocrine: Negative for polydipsia and polyuria  Genitourinary: Negative for difficulty urinating, dysuria, frequency and urgency  Musculoskeletal: Negative for arthralgias, joint swelling and myalgias  Skin: Negative for rash  Neurological: Negative for dizziness, weakness, numbness and headaches  Hematological: Negative for adenopathy  Does not bruise/bleed easily  Psychiatric/Behavioral: Negative for dysphoric mood  The patient is not nervous/anxious         Past Medical History:     Past Medical History:   Diagnosis Date    Allergic rhinitis     Vertigo     LAST ASSESSED 3/8/13; RESOLVED 11/10/15      Past Surgical History:     Past Surgical History:   Procedure Laterality Date    ABDOMINAL SURGERY      HYSTERECTOMY  2006    TOTAL ABDOMINAL HYSTERECTOMY  2006    WITH REMOVAL OF BOTH OVARIES       Social History:        Social History     Socioeconomic History    Marital status: /Civil Union     Spouse name: None    Number of children: None    Years of education: None    Highest education level: None   Occupational History    None   Social Needs    Financial resource strain: None    Food insecurity     Worry: None     Inability: None    Transportation needs     Medical: None     Non-medical: None   Tobacco Use    Smoking status: Never Smoker    Smokeless tobacco: Never Used   Substance and Sexual Activity    Alcohol use: Yes     Comment: Social    Drug use: No    Sexual activity: Not Currently   Lifestyle    Physical activity     Days per week: None     Minutes per session: None    Stress: None   Relationships    Social connections     Talks on phone: None     Gets together: None     Attends Restoration service: None     Active member of club or organization: None     Attends meetings of clubs or organizations: None     Relationship status: None    Intimate partner violence     Fear of current or ex partner: None     Emotionally abused: None     Physically abused: None     Forced sexual activity: None   Other Topics Concern    None   Social History Narrative    None      Family History:     Family History   Problem Relation Age of Onset    Other Mother         MULTIPLE MYELOMA    Diabetes Father     Melanoma Father     Prostate cancer Father     Diabetes Brother     Hypertension Brother       Current Medications:     Current Outpatient Medications   Medication Sig Dispense Refill    Cetirizine HCl (ZYRTEC ALLERGY) 10 MG CAPS Take 1 tablet by mouth daily at bedtime       estradiol (MENOSTAR) 14 MCG/24HR PLACE 1 PATCH ON THE SKIN ONCE WEEKLY      fexofenadine-pseudoephedrine (ALLEGRA-D)  MG per tablet Take 1 tablet by mouth daily       fluticasone (FLONASE) 50 mcg/act nasal spray 2 sprays into each nostril daily       No current facility-administered medications for this visit  Allergies: Allergies   Allergen Reactions    Sulfa Antibiotics     Sulfasalazine       Physical Exam:     /80   Pulse 80   Temp 97 6 °F (36 4 °C)   Resp 18   Ht 5' 6 5" (1 689 m)   Wt 63 5 kg (140 lb)   SpO2 99%   BMI 22 26 kg/m²     Physical Exam  Vitals signs and nursing note reviewed  Constitutional:       Appearance: Normal appearance  She is normal weight  HENT:      Head: Normocephalic and atraumatic  Right Ear: Tympanic membrane and ear canal normal       Left Ear: Tympanic membrane and ear canal normal       Mouth/Throat:      Mouth: Mucous membranes are moist    Eyes:      Extraocular Movements: Extraocular movements intact  Pupils: Pupils are equal, round, and reactive to light  Neck:      Musculoskeletal: Normal range of motion and neck supple  Vascular: No carotid bruit  Cardiovascular:      Rate and Rhythm: Normal rate and regular rhythm  Pulses: Normal pulses  Heart sounds: Normal heart sounds  Pulmonary:      Effort: Pulmonary effort is normal       Breath sounds: Normal breath sounds  Abdominal:      General: Abdomen is flat  Palpations: Abdomen is soft  Musculoskeletal: Normal range of motion  Lymphadenopathy:      Cervical: No cervical adenopathy  Skin:     General: Skin is warm and dry  Neurological:      General: No focal deficit present  Mental Status: She is alert and oriented to person, place, and time     Psychiatric:         Mood and Affect: Mood normal          Behavior: Behavior normal           MD Mary Beth Melton [id number]

## 2021-03-02 ENCOUNTER — APPOINTMENT (EMERGENCY)
Dept: CT IMAGING | Facility: HOSPITAL | Age: 61
End: 2021-03-02
Payer: COMMERCIAL

## 2021-03-02 ENCOUNTER — HOSPITAL ENCOUNTER (EMERGENCY)
Facility: HOSPITAL | Age: 61
Discharge: HOME | End: 2021-03-02
Attending: EMERGENCY MEDICINE
Payer: COMMERCIAL

## 2021-03-02 ENCOUNTER — APPOINTMENT (EMERGENCY)
Dept: RADIOLOGY | Facility: HOSPITAL | Age: 61
End: 2021-03-02
Payer: COMMERCIAL

## 2021-03-02 VITALS
RESPIRATION RATE: 16 BRPM | BODY MASS INDEX: 23.83 KG/M2 | SYSTOLIC BLOOD PRESSURE: 126 MMHG | DIASTOLIC BLOOD PRESSURE: 73 MMHG | HEART RATE: 74 BPM | TEMPERATURE: 98.2 F | WEIGHT: 149.91 LBS | OXYGEN SATURATION: 100 %

## 2021-03-02 DIAGNOSIS — M79.632 LEFT FOREARM PAIN: ICD-10-CM

## 2021-03-02 DIAGNOSIS — S05.12XA: ICD-10-CM

## 2021-03-02 DIAGNOSIS — S80.211A ABRASION OF RIGHT KNEE, INITIAL ENCOUNTER: ICD-10-CM

## 2021-03-02 DIAGNOSIS — S09.90XA CLOSED HEAD INJURY, INITIAL ENCOUNTER: ICD-10-CM
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
## 2021-03-02 PROCEDURE — 73030 X-RAY EXAM OF SHOULDER: CPT

## 2021-03-02 PROCEDURE — 73080 X-RAY EXAM OF ELBOW: CPT

## 2021-03-02 PROCEDURE — 73090 X-RAY EXAM OF FOREARM: CPT

## 2021-03-02 PROCEDURE — 70450 CT HEAD/BRAIN W/O DYE: CPT

## 2021-03-02 PROCEDURE — 73564 X-RAY EXAM KNEE 4 OR MORE: CPT

## 2021-03-02 PROCEDURE — 99284 EMERGENCY DEPT VISIT MOD MDM: CPT

## 2021-03-02 PROCEDURE — 70486 CT MAXILLOFACIAL W/O DYE: CPT

## 2021-03-02 PROCEDURE — 99284 EMERGENCY DEPT VISIT MOD MDM: CPT | Performed by: PHYSICIAN ASSISTANT

## 2021-03-02 PROCEDURE — 72125 CT NECK SPINE W/O DYE: CPT

## 2021-03-02 PROCEDURE — 73110 X-RAY EXAM OF WRIST: CPT

## 2021-03-02 PROCEDURE — 73060 X-RAY EXAM OF HUMERUS: CPT

## 2021-03-02 RX ORDER — ONDANSETRON 4 MG/1
4 TABLET, ORALLY DISINTEGRATING ORAL ONCE
Status: COMPLETED | OUTPATIENT
Start: 2021-03-02 | End: 2021-03-02

## 2021-03-02 RX ORDER — IBUPROFEN 600 MG/1
600 TABLET ORAL ONCE
Status: COMPLETED | OUTPATIENT
Start: 2021-03-02 | End: 2021-03-02

## 2021-03-02 RX ORDER — ACETAMINOPHEN 325 MG/1
650 TABLET ORAL ONCE
Status: COMPLETED | OUTPATIENT
Start: 2021-03-02 | End: 2021-03-02

## 2021-03-02 RX ORDER — METHOCARBAMOL 500 MG/1
500 TABLET, FILM COATED ORAL 2 TIMES DAILY
Qty: 20 TABLET | Refills: 0 | Status: SHIPPED | OUTPATIENT
Start: 2021-03-02 | End: 2021-11-16

## 2021-03-02 RX ORDER — ONDANSETRON 4 MG/1
4 TABLET, ORALLY DISINTEGRATING ORAL EVERY 8 HOURS PRN
Qty: 10 TABLET | Refills: 0 | Status: SHIPPED | OUTPATIENT
Start: 2021-03-02 | End: 2021-11-16

## 2021-03-02 RX ADMIN — ONDANSETRON 4 MG: 4 TABLET, ORALLY DISINTEGRATING ORAL at 14:16

## 2021-03-02 RX ADMIN — ACETAMINOPHEN 650 MG: 325 TABLET ORAL at 14:18

## 2021-03-02 RX ADMIN — IBUPROFEN 600 MG: 600 TABLET ORAL at 16:34

## 2021-03-02 NOTE — ED PROVIDER NOTES
History  Chief Complaint   Patient presents with    Fall     Pt has mechanical fall, hit head, landed on knees, and has left arm pain  Pt denies LOC and thinners  Pt took tylenol PTA  Myriam Brooks is a 64year old rhd female arriving ambulatory to the emergency department accompanied by spouse for evaluation status post mechanical fall this morning around approximately 11:00 a m     The patient states that she had tripped while walking, falling forward on her outstretched hands, directly striking the left side of her face against the concrete surface  She denies loss of consciousness or neck pain, as well as headache, vision disturbance, lightheadedness or dizziness  She admits that she did experience bleeding just above the left eyebrow which has since resolved  Her tetanus is current  She is not on any blood thinning medications  She primarily offers complaint of left forearm pain, presenting with a splint to the left upper extremity  She also has right knee pain admitting that she had landed on a flexed right knee and had endured overlying skin abrasions  She has been ambulatory since the fall that she admits that bearing weight on the right upper extremity exacerbates her pain  She denies hip pain or extremity numbness or weakness  She denies prior injuries to the right knee of left forearm though she admits that she does have issues with the left rotator cuff           History provided by:  Patient  Fall  Mechanism of injury: fall    Injury location:  Head/neck, face and shoulder/arm  Head/neck injury location:  Scalp  Facial injury location:  Face  Shoulder/arm injury location:  L shoulder, L forearm and L wrist  Fall:     Fall occurred:  Walking    Point of impact:  Hands and head  Tetanus status:  Up to date  Prior to arrival data:     Responsiveness at scene:  Alert    Orientation at scene:  Person, place, situation and time    Loss of consciousness: no      Amnesic to event: no Associated symptoms: nausea    Associated symptoms: no abdominal pain, no back pain, no chest pain, no headaches, no loss of consciousness, no neck pain and no vomiting        Prior to Admission Medications   Prescriptions Last Dose Informant Patient Reported? Taking? Cetirizine HCl (ZYRTEC ALLERGY) 10 MG CAPS  Self Yes No   Sig: Take 1 tablet by mouth daily at bedtime    estradiol (MENOSTAR) 14 MCG/24HR  Self Yes No   Sig: PLACE 1 PATCH ON THE SKIN ONCE WEEKLY   fexofenadine-pseudoephedrine (ALLEGRA-D)  MG per tablet  Self Yes No   Sig: Take 1 tablet by mouth daily    fluticasone (FLONASE) 50 mcg/act nasal spray  Self Yes No   Si sprays into each nostril daily      Facility-Administered Medications: None       Past Medical History:   Diagnosis Date    Allergic rhinitis     Gets immunotherapy with Dr Kusum Goel 3/8/13; RESOLVED 11/10/15       Past Surgical History:   Procedure Laterality Date    ABDOMINAL SURGERY      BLADDER SUSPENSION      BUNIONECTOMY Right     with implants    TOTAL ABDOMINAL HYSTERECTOMY      WITH REMOVAL OF BOTH OVARIES        Family History   Problem Relation Age of Onset    Other Mother         MULTIPLE MYELOMA    Diabetes Father     Melanoma Father     Prostate cancer Father     Diabetes Brother     Hypertension Brother      I have reviewed and agree with the history as documented  E-Cigarette/Vaping    E-Cigarette Use Never User      E-Cigarette/Vaping Substances    Nicotine No     THC No     CBD No     Flavoring No     Other No     Unknown No      Social History     Tobacco Use    Smoking status: Never Smoker    Smokeless tobacco: Never Used   Substance Use Topics    Alcohol use: Yes     Comment: occasional    Drug use: No       Review of Systems   Constitutional: Negative for appetite change, chills and fever  Eyes: Negative for pain and visual disturbance  Respiratory: Negative for cough and shortness of breath  Cardiovascular: Negative for chest pain  Gastrointestinal: Positive for nausea  Negative for abdominal pain and vomiting  Musculoskeletal: Positive for arthralgias, joint swelling and myalgias  Negative for back pain, neck pain and neck stiffness  Skin: Positive for wound  Neurological: Negative for dizziness, loss of consciousness, weakness, numbness and headaches  All other systems reviewed and are negative  Physical Exam  Physical Exam  Vitals signs and nursing note reviewed  Constitutional:       General: She is not in acute distress  Appearance: Normal appearance  She is well-developed  She is not ill-appearing, toxic-appearing or diaphoretic  HENT:      Head: Normocephalic and atraumatic  Jaw: There is normal jaw occlusion  Comments: No facial bone tenderness to palpation     Nose: Nose normal  No nasal deformity  Eyes:      General: Vision grossly intact  No visual field deficit  Extraocular Movements: Extraocular movements intact  Right eye: Normal extraocular motion and no nystagmus  Left eye: Normal extraocular motion and no nystagmus  Conjunctiva/sclera: Conjunctivae normal       Right eye: Right conjunctiva is not injected  Left eye: Left conjunctiva is not injected  Pupils: Pupils are equal, round, and reactive to light  Neck:      Musculoskeletal: Full passive range of motion without pain, normal range of motion and neck supple  Normal range of motion  No neck rigidity, injury, pain with movement, spinous process tenderness or muscular tenderness  Cardiovascular:      Rate and Rhythm: Normal rate and regular rhythm  Pulses: Normal pulses  Radial pulses are 2+ on the right side and 2+ on the left side  Heart sounds: Normal heart sounds  Pulmonary:      Effort: Pulmonary effort is normal  No respiratory distress  Breath sounds: Normal breath sounds  No decreased breath sounds, wheezing, rhonchi or rales  Chest:      Chest wall: No tenderness  Abdominal:      General: Bowel sounds are normal  There is no distension  Palpations: Abdomen is soft  Tenderness: There is no abdominal tenderness  Musculoskeletal:      Right lower leg: No edema  Left lower leg: No edema  Comments: No midline spinous process tenderness to palpation  No obvious deformity on inspection of the left upper extremity  There is no bony tenderness to palpation of the left hand/wrist, and no snuff box tenderness  Radial pulse 2+, with normal sensation and capillary refill bilaterally   strength intact b/l  There is tenderness to palpation throughout the left forearm with pain elicited by pronation and supination of the arm  There is tenderness to palpation of the left elbow with decreased range of motion secondary to pain  There was no tenderness to palpation of the left shoulder  Skin:     General: Skin is warm and dry  Capillary Refill: Capillary refill takes less than 2 seconds  Findings: Abrasion (Superficial skin tears of the right knee  No active bleeding or evidence of foreign bodies  ) present  Neurological:      General: No focal deficit present  Mental Status: She is alert and oriented to person, place, and time  Mental status is at baseline  GCS: GCS eye subscore is 4  GCS verbal subscore is 5  GCS motor subscore is 6  Cranial Nerves: No dysarthria  Sensory: Sensation is intact  No sensory deficit  Motor: Motor function is intact  No weakness or abnormal muscle tone  Gait: Gait is intact   Gait normal          Vital Signs  ED Triage Vitals [03/02/21 1327]   Temperature Pulse Respirations Blood Pressure SpO2   98 2 °F (36 8 °C) 80 18 149/83 98 %      Temp Source Heart Rate Source Patient Position - Orthostatic VS BP Location FiO2 (%)   Oral Monitor Sitting Right arm --      Pain Score       8           Vitals:    03/02/21 1327 03/02/21 1601   BP: 149/83 126/73 Pulse: 80 74   Patient Position - Orthostatic VS: Sitting Sitting         Visual Acuity  Visual Acuity      Most Recent Value   L Pupil Size (mm)  3   R Pupil Size (mm)  3          ED Medications  Medications   acetaminophen (TYLENOL) tablet 650 mg (650 mg Oral Given 3/2/21 1418)   ondansetron (ZOFRAN-ODT) dispersible tablet 4 mg (4 mg Oral Given 3/2/21 1416)   ibuprofen (MOTRIN) tablet 600 mg (600 mg Oral Given 3/2/21 1634)       Diagnostic Studies  Results Reviewed     None                 CT head without contrast   Final Result by Antionette Garcia DO (03/02 1542)      No acute intracranial abnormality  Workstation performed: [id number]         CT spine cervical without contrast   Final Result by Antionette Garcia DO (03/02 1545)      No cervical spine fracture or traumatic malalignment  Mild degenerative changes particularly C5-6 and C6-7  Workstation performed: [id number]         CT facial bones without contrast   Final Result by Antionette Garcia DO (03/02 1548)      Normal noncontrast CT of the facial bones  Mild periorbital soft tissue swelling on the left  Workstation performed: [id number]         XR shoulder 2+ views LEFT   ED Interpretation by Pal Camara PA-C (03/02 1528)   No acute osseous abnormality      Final Result by Alex Cobb MD (03/02 1635)      No acute osseous abnormality  Workstation performed: [id number]         XR humerus LEFT   ED Interpretation by Pal Camara PA-C (03/02 1528)   No acute osseous abnormality      Final Result by Alex Cobb MD (03/02 1636)      No acute osseous abnormality  Workstation performed: [id number]         XR knee 4+ vw right injury   ED Interpretation by Pal Camara PA-C (03/02 1528)   No acute osseous abnormality      Final Result by Alex Cobb MD (03/02 1635)      Small effusion  No acute fracture        Workstation performed: [id number]         XR wrist 3+ views LEFT   ED Interpretation by Jairo Dos Santos PA-C (03/02 1528)   No acute osseous abnormality      Final Result by Ronnie Travis MD (03/02 3535)      No acute osseous abnormality  Workstation performed: [id number]         XR forearm 2 views LEFT   ED Interpretation by Jairo Dos Santos PA-C (03/02 1528)   No acute osseous abnormality      Final Result by Kenya Recinos MD (03/03 2286)      Minimally displaced left radial neck fracture again noted  Findings concur with the preliminary report by the referring clinician already in PACS and/or our electronic record EPIC  Workstation performed: [id number]         XR elbow 3+ vw LEFT   ED Interpretation by Jairo Dos Santos PA-C (03/02 1528)   No acute osseous abnormality      Final Result by Ronnie Travis MD (03/02 3206)      Effusion with nondisplaced radial neck fracture  The study was marked in [facility] [place] for immediate notification  Workstation performed: [id number]                    Procedures  Procedures         ED Course                     MDM  Number of Diagnoses or Management Options  Abrasion of right knee, initial encounter:   Closed head injury, initial encounter: new and requires workup  Fall, initial encounter: new and requires workup  Left forearm pain: new and requires workup  Traumatic contusion of periorbital region, left, initial encounter: new and requires workup  Diagnosis management comments: This is a 53-year-old female arriving ambulatory to the emergency department accompanied by spouse for evaluation of injuries sustained status post mechanical fall this morning at 11:00 a m  Patient states that she had tripped while she was walking, falling forward on to her outstretched hands and directly striking her face against a concrete surface  She denies loss of consciousness and she is not on any blood thinning medications    She presents with left supraorbital swelling, right knee abrasions, and left forearm pain  She denies any neurologic deficits and she has been ambulatory since the fall  Her tetanus is current  Differential diagnosis includes but not limited to: assess for acute intracranial abnormality, acute c-spine fracture/dislocation, acute facial bone fracture, contusion, abrasion, fracture/dislocation of the left upper extremity    Initial ED plan: CT head, facial bones and c-spine, x-rays, pain control    Final ED Assessment: Vital signs stable on hospital arrival, examination as documented above  GCS 15 without focal neurologic deficits  All imaging was independently reviewed  The x-rays appeared without acute osseous abnormality on my read; however, the patient was noted to have a nondisplaced fracture of the left radial neck on official reads which had resulted after time of discharge  The patient was discharged with sling and provided orthopedic follow-up  She was called called and personally notified of official radiology reports  She was encouraged to call Orthopedic Surgery to be seen in follow up  Rest, ice, compression, elevation encouraged  Encouraged to take Tylenol and Ibuprofen as needed for pain relief as well  Muscle relaxant sent to the pharmacy and standard muscle relaxant precautions discussed  Patient advised to return immediately to the emergency department with new or worsening symptoms  She had verbalized understanding and was agreeable with disposition plan  The patient is stable for discharge home           Amount and/or Complexity of Data Reviewed  Tests in the radiology section of CPT®: ordered and reviewed  Review and summarize past medical records: yes  Independent visualization of images, tracings, or specimens: yes    Risk of Complications, Morbidity, and/or Mortality  Presenting problems: moderate  Diagnostic procedures: moderate  Management options: moderate    Patient Progress  Patient progress: stable      Disposition  Final diagnoses:   Fall, initial encounter   Closed head injury, initial encounter   Left forearm pain   Abrasion of right knee, initial encounter   Traumatic contusion of periorbital region, left, initial encounter     Time reflects when diagnosis was documented in both MDM as applicable and the Disposition within this note     Time User Action Codes Description Comment    3/2/2021  4:01 PM [place] Add [W19  SVNV] Fall, initial encounter     3/2/2021  4:01 PM [place] Add [S09 90XA] Closed head injury, initial encounter     3/2/2021  4:17 PM [place] Add [R90 664] Left forearm pain     3/2/2021  4:17 PM [place] Add [S80 211A] Abrasion of right knee, initial encounter     3/2/2021  4:20 PM [place] Add [K20 88ZU] Traumatic contusion of periorbital region, left, initial encounter       ED Disposition     ED Disposition Condition Date/Time Comment    Discharge Stable Tue Mar 2, 2021  4:01 PM Rubi Goins discharge to home/self care  Follow-up Information     Follow up With Specialties Details Why Contact Info Additional Brandon Seaman MD Family Medicine Schedule an appointment as soon as possible for a visit  As needed [id number]    [address] [facility] Emergency Department Emergency Medicine  If symptoms worsen [facility] [address]  [address] [facility] Emergency Department, [address] [place] [address], [address] Orthopedic Surgery Schedule an appointment as soon as possible for a visit  If forearm pain persists [address]  [address] [facility] [address] [phone] [address], [address], [address] [place], [place], [address] [phone]          Discharge Medication List as of 3/2/2021 4:21 PM      START taking these medications    Details   ondansetron (ZOFRAN-ODT) 4 mg disintegrating tablet Take 1 tablet (4 mg total) by mouth every 8 (eight) hours as needed for nausea or vomiting, Starting Tue 3/2/2021, Normal         CONTINUE these medications which have NOT CHANGED    Details   Cetirizine HCl (ZYRTEC ALLERGY) 10 MG CAPS Take 1 tablet by mouth daily at bedtime , Starting Thu 3/7/2013, Historical Med      estradiol (MENOSTAR) 14 MCG/24HR PLACE 1 PATCH ON THE SKIN ONCE WEEKLY, Historical Med      fexofenadine-pseudoephedrine (ALLEGRA-D)  MG per tablet Take 1 tablet by mouth daily , Starting Thu 3/7/2013, Historical Med      fluticasone (FLONASE) 50 mcg/act nasal spray 2 sprays into each nostril daily, Starting Mon 3/12/2012, Historical Med           No discharge procedures on file      PDMP Review       Value Time User    PDMP Reviewed  Yes 12/17/2019 10:17 AM Lizy Mcdonough MD          ED Provider  Electronically Signed by           Maicol Chaidez PA-C  03/03/21 1941

## 2021-03-02 NOTE — DISCHARGE INSTRUCTIONS
Maintain sling as needed for pain relief, encourage mobility as tolerated  Encourage rest, ice, elevation, compression  Take tylenol (max 3g daily) and ibuprofen (max 3,200mg daily) for pain relief  Follow up with Orthopedics if symptoms persist      Return immediately to the Emergency department if you experience new or worrisome symptoms

## 2021-03-03 ENCOUNTER — OFFICE VISIT (OUTPATIENT)
Dept: OBGYN CLINIC | Facility: MEDICAL CENTER | Age: 61
End: 2021-03-03
Payer: COMMERCIAL

## 2021-03-03 VITALS — WEIGHT: 149 LBS | HEIGHT: 67 IN | BODY MASS INDEX: 23.39 KG/M2

## 2021-03-03 DIAGNOSIS — S80.01XA CONTUSION OF RIGHT KNEE, INITIAL ENCOUNTER: ICD-10-CM

## 2021-03-03 DIAGNOSIS — S52.135A CLOSED NONDISPLACED FRACTURE OF NECK OF LEFT RADIUS, INITIAL ENCOUNTER: Primary | ICD-10-CM

## 2021-03-03 DIAGNOSIS — S46.912A STRAIN OF LEFT SHOULDER, INITIAL ENCOUNTER: ICD-10-CM

## 2021-03-03 DIAGNOSIS — M54.2 ACUTE NECK PAIN: ICD-10-CM

## 2021-03-03 PROCEDURE — 99204 OFFICE O/P NEW MOD 45 MIN: CPT | Performed by: STUDENT IN AN ORGANIZED HEALTH CARE EDUCATION/TRAINING PROGRAM

## 2021-03-03 PROCEDURE — 29065 APPL CST SHO TO HAND LNG ARM: CPT | Performed by: STUDENT IN AN ORGANIZED HEALTH CARE EDUCATION/TRAINING PROGRAM

## 2021-03-03 RX ORDER — TRAMADOL HYDROCHLORIDE 50 MG/1
50 TABLET ORAL EVERY 6 HOURS PRN
Qty: 16 TABLET | Refills: 0 | Status: SHIPPED | OUTPATIENT
Start: 2021-03-03 | End: 2021-03-07

## 2021-03-03 RX ORDER — NAPROXEN 500 MG/1
500 TABLET ORAL 2 TIMES DAILY WITH MEALS
Qty: 60 TABLET | Refills: 0 | Status: SHIPPED | OUTPATIENT
Start: 2021-03-03 | End: 2021-11-16

## 2021-03-03 NOTE — PATIENT INSTRUCTIONS
Diagnosis as a minimally displaced radial neck fracture  Recommended 1 week of immobilization before progressively returning to motion  Can use as needed Tylenol 500-1000 mg every 6-8 hours as needed  Can take naproxen 500 mg twice a day as needed concurrently  If pain is severe, can use tramadol every 6 hours as needed

## 2021-03-03 NOTE — PROGRESS NOTES
1  Closed nondisplaced fracture of neck of left radius, initial encounter  naproxen (NAPROSYN) 500 mg tablet    traMADol (ULTRAM) 50 mg tablet   2  Contusion of right knee, initial encounter  naproxen (NAPROSYN) 500 mg tablet   3  Strain of left shoulder, initial encounter  naproxen (NAPROSYN) 500 mg tablet   4  Acute neck pain       No orders of the defined types were placed in this encounter  Imaging Studies (I personally reviewed images in PACS and report):    Prior imagin  CT cervical spine w/o contrast 2021: No cervical spine fracture or traumatic malalignment  Mild degenerative changes particularly C5-6 and C6-7   2   CT facial bones without contrast 2021:  Normal noncontrast CT of the facial bones  Mild periorbital soft tissue swelling on the left  3  CT head without contrast 2021: No acute intracranial abnormalities  4  X-ray left humerus 2021: No acute osseous abnormality  5  X-ray left shoulder 2021: No acute osseous abnormality  6  X-ray left elbow 2021:  Elbow effusion noted  There is a nondisplaced slightly impacted radial neck fracture  7  X-ray left forearm 2021:  Again, notes the left sided minimally displaced radial neck fracture  8  X-ray left wrist 2021: No acute osseous abnormality there is a small ossific density adjacent to the ulnar styloid that appears corticated and favored to represent an ossicle or chronic avulsion  Clinical correlation with site of pain is recommended  (Note: non-tender on exam today)  9  X-ray right knee 2021: Small joint effusion  No acute fracture  IMPRESSION:  1  Left-sided minimally displaced radial neck fracture    2  Acute right anterior knee pain  DDx: Right knee contusion, prepatellar bursitis    3  Acute left shoulder pain  DDx: rotator cuff strain, subacromial bursitis, referred pain from her neck, shoulder contusion    4   Acute neck pain  DDx: started after using sling so likely secondary to sling versus flare of cervical spondylosis    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)    PLAN:  - Clinical exam and radiographic imaging reviewed with patient and her  today, with impression as above  - In regards to her left-sided non-displaced radial neck fracture - Patient reports being unable to tolerate using a sling for 1 week d/t discomfort; will cast for 1 week (as per procedure note below)  S/p casting, I will recommend patient work on improving elbow ROM with restrictions of lifting/pushing/pulling (other than dressing/hygienic/office/administrative duties) of her LUE for at least 6-8 weeks post-injury  I will likely need to refer to formal PT given her numerous areas of injury  - In regards to pain control, I prescribed naproxen 500mg PO BID along with recommending acetaminophen 500-1000mg PO Q6-8H  I also prescribed 4-day supply of tramadol 50mg Q6H PRN severe pain  - In regards to her right knee pain, it is consistent with a knee contusion at this time  I counseled treating conservatively with OTC pain meds, icing, and home ROM movement exercises  - In regards to her left shoulder/neck pain, exam of left shoulder is limited as patient has limited LUE due to pain from radial neck fracture  Radiographs of her right shoulder are unremarkable and likely this pain is 2/2 rotator cuff strain, cervical spondylosis flare, left shoulder contusion    - I counseled that once her cast is removed next week, will refer to formal PT to address each issue  Return in about 1 week (around 3/10/2021)        CHIEF COMPLAINT:  Left arm pain, right knee pain    HPI:  Yony Ray is a 64 y o  female  who presents with her significant other for       Visit 03/03/2021 :  Initial evaluation of multiple injuries after fall: Patient reports mechanical fall on 03/02/2021 at work where she tripped on a strap while walking, subsequently falling forward onto her outstretched arms and impacting her right knee and left face  Denies LOC, neck pain  She then went to the ER where she had several imaging studies done, as noted above  She was found to have a left radial neck fracture and placed in a sling and prescribed robaxin and zofran  She reports today that she is unable to tolerate wearing her sling as it is aggravating her left elbow pain and now aggravating neck pain (denies neck pain after the injury)  In regards to her left arm, she has pain of her left forearm that is aggravated with ROM, especially supination/pronation  Denies swelling, bruising, numbness/tingling  She is able to move her left wrist/hand without issue  She also has left shoulder pain over the lateral aspect of her shoulder  She recalls falling on her lateral shoulder from the injury  She has limited ROM of her left shoulder as it aggravates her left forearm/elbow pain  Denies left shoulder bruising/swelling, numbness/tingling, clicking/popping  She also reports right anterior knee pain - improved since the injury  She has superficial abrasions of her anterior knee  Pain is mild, intermittent, non-radiating  Denies knee clicking/popping, knee locking in extension  She has been able to weightbear on her RLE with mild discomfort over her anterior knee  Denies numbness/tingling  Denies prior history of LUE and right knee injuries/surgeries in the past      She requesting further control of her pain as she has difficulty sleeping at night from the pain  Minimal improvement with tylenol, some relief with OTC aleve  No relief with robaxin  Review of Systems   Constitutional: Negative for chills and fever  Respiratory: Negative for cough and shortness of breath  Gastrointestinal: Negative for abdominal pain, nausea and vomiting     Musculoskeletal:        As per HPI   Skin:        +bruising left eye   Neurological:        As per HPI         Medical, Surgical, Family, and Social History    Past Medical History:   Diagnosis Date    Allergic rhinitis     Gets immunotherapy with Dr Nora Salazar 3/8/13; RESOLVED 11/10/15     Past Surgical History:   Procedure Laterality Date    ABDOMINAL SURGERY      BLADDER SUSPENSION      BUNIONECTOMY Right     with implants    TOTAL ABDOMINAL HYSTERECTOMY  2006    WITH REMOVAL OF BOTH OVARIES      Social History   Social History     Substance and Sexual Activity   Alcohol Use Yes    Comment: occasional     Social History     Substance and Sexual Activity   Drug Use No     Social History     Tobacco Use   Smoking Status Never Smoker   Smokeless Tobacco Never Used     Family History   Problem Relation Age of Onset    Other Mother         MULTIPLE MYELOMA    Diabetes Father     Melanoma Father     Prostate cancer Father     Diabetes Brother     Hypertension Brother      Allergies   Allergen Reactions    Sulfa Antibiotics     Sulfasalazine           Physical Exam  Ht 5' 6 5" (1 689 m)   Wt 67 6 kg (149 lb)   BMI 23 69 kg/m²     Constitutional:  see vital signs  Gen: well-developed, normocephalic/atraumatic, well-groomed  Eyes: No inflammation or discharge of conjunctiva or lids; sclera clear   Pharynx: no inflammation, lesion, or mass of lips  Neck: supple, no masses, non-distended  MSK: no inflammation, lesion, mass, or clubbing of nails and digits except for other than mentioned below  SKIN: no visible rashes or skin lesions  Pulmonary/Chest: Effort normal  No respiratory distress  NEURO: cranial nerves grossly intact  PSYCH:  Alert and oriented to person, place, and time; recent and remote memory intact; mood normal, no depression, anxiety, or agitation, judgment and insight good and intact     Ortho Exam    Left Elbow:   Inspection: no swelling, erythema, or increased warmth    ROM  Flexion: 110  Extension: -10 (w/ elbow pain)  Pronation (from neutral): 40 (w/ pain)  Supination (from neutral): 40 (w/ pain)    Deferred strength d/t fracture    Tenderness: + radial neck    Left Wrist  no swelling, erythema, or increased warmth  limited flexion/ extension due to aggravation of forearm pain  nontender    Left Hand  no erythema  Swelling: none  Tenderness: none  rom fingers mcp, pip, dip intact without pain  No digital scissoring or deviation of fingers  no extensor lag  no rotation of fingers  no joint laxity  strength flexion and extension mcp, pip, dip 5/5  sensation intact  capillary refill <2secs      Shoulder exam:       LEFT    Inspection Erythema None     Swelling None     Increased Warmth None    Rotator cuff  (strenghth)  Limited exam, as motion of her LUE aggravates her elbow/forearm pain  Patient able to demonstrate at least 4/5 abduction resistance    ROM  Limited exam due to left elbow/forearm pain to fully exam ROM of shoulder     FF 90     Abduction 90     ER0 40     IR90 40    TTP:  +lateral aspect of shoulder over greater tuberosity    Special Tests:       Ramirez Positive        UE NV Exam: +2 Radial pulses bilaterally  Sensation intact to light touch C5-T1 bilaterally      Right Knee Exam:  Erythema: none, only superficial healing abrasions of anterior knee  Swelling: Localized to pre-patellar bursa  Increased Warmth: no  Tenderness: +anterior aspect of knee  ROM: 0-110  Patellar Apprehension: negative  Patellar Grind: negative  Lachman's: negative  Anterior Drawer: negative  Varus laxity: negative  Valgus laxity: negative  [facility]: negative     Cast application    Date/Time: 3/3/2021 4:40 PM  Performed by: Colin Ortiz MD  Authorized by: Colin Ortiz MD   Universal Protocol:  Risks and benefits: risks, benefits and alternatives were discussed  Consent given by: patient  Time out: Immediately prior to procedure a "time out" was called to verify the correct patient, procedure, equipment, support staff and site/side marked as required    Timeout called at: 3/3/2021 4:40 PM   Patient understanding: patient states understanding of the procedure being performed  Radiology Images displayed and confirmed  If images not available, report reviewed: imaging studies available  Patient identity confirmed: verbally with patient      Pre-procedure details:     Sensation:  Normal    Skin color:  No left elbow/forearm brusing, pallor, erythema  Procedure details:     Laterality:  Left    Location:  Elbow    Elbow:  L elbow    Strapping: no  Cast type:  Long arm (long arm cast with wrist in neutral position)    Supplies:  Cotton padding and fiberglass  Post-procedure details:     Pain:  Unchanged    Sensation:  Normal    Patient tolerance of procedure:   Tolerated well, no immediate complications

## 2021-03-03 NOTE — RESULT ENCOUNTER NOTE
Patient notified of x-ray findings by telephone call  Encouraged to maintain sling and call Orthopedics today to schedule follow-up  Patient verbalized understanding and is agreeable  Call Back complete

## 2021-03-10 ENCOUNTER — TELEPHONE (OUTPATIENT)
Dept: FAMILY MEDICINE CLINIC | Facility: CLINIC | Age: 61
End: 2021-03-10

## 2021-03-10 ENCOUNTER — OFFICE VISIT (OUTPATIENT)
Dept: OBGYN CLINIC | Facility: MEDICAL CENTER | Age: 61
End: 2021-03-10
Payer: COMMERCIAL

## 2021-03-10 VITALS — HEIGHT: 67 IN | BODY MASS INDEX: 23.39 KG/M2 | WEIGHT: 149 LBS

## 2021-03-10 DIAGNOSIS — M25.512 ACUTE PAIN OF LEFT SHOULDER: ICD-10-CM

## 2021-03-10 DIAGNOSIS — Z23 ENCOUNTER FOR IMMUNIZATION: ICD-10-CM

## 2021-03-10 DIAGNOSIS — S80.01XD CONTUSION OF RIGHT KNEE, SUBSEQUENT ENCOUNTER: ICD-10-CM

## 2021-03-10 DIAGNOSIS — Z91.81 HISTORY OF FALL: ICD-10-CM

## 2021-03-10 DIAGNOSIS — S52.135A CLOSED NONDISPLACED FRACTURE OF NECK OF LEFT RADIUS, INITIAL ENCOUNTER: Primary | ICD-10-CM

## 2021-03-10 DIAGNOSIS — M75.22 BICIPITAL TENDINITIS OF LEFT SHOULDER: ICD-10-CM

## 2021-03-10 PROCEDURE — 99213 OFFICE O/P EST LOW 20 MIN: CPT | Performed by: STUDENT IN AN ORGANIZED HEALTH CARE EDUCATION/TRAINING PROGRAM

## 2021-03-10 PROCEDURE — 1036F TOBACCO NON-USER: CPT | Performed by: STUDENT IN AN ORGANIZED HEALTH CARE EDUCATION/TRAINING PROGRAM

## 2021-03-10 NOTE — TELEPHONE ENCOUNTER
Alisson called the office stating she need patient medical record for the past 5 years  She is requesting records due to patient applying for life insurance  Fax: [phone]

## 2021-03-11 ENCOUNTER — VBI (OUTPATIENT)
Dept: ADMINISTRATIVE | Facility: OTHER | Age: 61
End: 2021-03-11

## 2021-03-13 NOTE — PROGRESS NOTES
1  Closed nondisplaced fracture of neck of left radius, initial encounter  Ambulatory referral to Physical Therapy   2  Contusion of right knee, subsequent encounter  Ambulatory referral to Physical Therapy   3  Bicipital tendinitis of left shoulder     4  Acute pain of left shoulder     5  History of fall       Orders Placed This Encounter   Procedures    Ambulatory referral to Physical Therapy        Imaging Studies (I personally reviewed images in PACS and report):    Prior imagin  CT cervical spine w/o contrast 2021: No cervical spine fracture or traumatic malalignment  Mild degenerative changes particularly C5-6 and C6-7   2   CT facial bones without contrast 2021:  Normal noncontrast CT of the facial bones  Mild periorbital soft tissue swelling on the left  3  CT head without contrast 2021: No acute intracranial abnormalities  4  X-ray left humerus 2021: No acute osseous abnormality  5  X-ray left shoulder 2021: No acute osseous abnormality  6  X-ray left elbow 2021:  Elbow effusion noted  There is a nondisplaced slightly impacted radial neck fracture  7  X-ray left forearm 2021:  Again, notes the left sided minimally displaced radial neck fracture  8  X-ray left wrist 2021: No acute osseous abnormality there is a small ossific density adjacent to the ulnar styloid that appears corticated and favored to represent an ossicle or chronic avulsion  Clinical correlation with site of pain is recommended  (Note: non-tender on exam today)  9  X-ray right knee 2021: Small joint effusion  No acute fracture  IMPRESSION:  1  Left-sided forearm pain  Minimally displaced radial neck fracture   Wrist extensor strain  - Completed 1 week immobilization in cast (couldn't tolerate sling)     2  Acute right anterior knee pain - improving  DDx: Right knee contusion, prepatellar bursitis     3   Acute left shoulder pain - no change  DDx: rotator cuff strain, subacromial bursitis, bicipital strain//tendinitis, referred pain from her neck, shoulder contusion     4  Acute neck pain - improving    5  History of precipitating mechanical fall on ground level leading to conditions as above    Date of injury: 03/02/2021  Follow up interval: 8 days     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)    PLAN:  Clinical exam and prior radiographic imaging reviewed with patient today, with impression as above  In regards to her left radial neck fracture - left-sided long arm cast discontinued s/p 1 week immobilization - I recommended working on ROM exercises only with her LUE at this time with a 5lb weight restriction; formal PT referral placed to facilitate gentle ROM exercises  Healing time is approximately 6-8 weeks  In regards to her left shoulder pain, PT can only be limited to ROM movement of her LUE at this time due to her radial neck fracture  Once it has been 6-8 weeks post injury and radial neck fx is clinically/radiographically stable, I will recommend starting strengthening as well for her LUE  In regards to her right knee pain - I recommended continued continued conservative treatments through icing/heat 20 minutes on/off, mobility, acetaminophen/NSAIDs  Patient requesting formal PT to address this issue as well so I have added it to her PT regimen  Return in about 3 weeks (around 3/31/2021)  CHIEF COMPLAINT:  Follow up multiple injuries    HPI:  Lukasz Najera is a 63 y o  female  who presents for       Visit 03/10/2021 : Follow up left radial neck fracture: Improved  Long arm cast removed today  Patient reports intensity of pain has improved, but reports pain across dorsal aspect of forearm, described as aching, intermittent, moderate intensity, aggravated with wrist ROM  Denies swelling, skin discoloration, numbness/tingling  Follow up left shoulder pain: No change   Still has pain over anterior aspect of her shoulder, described as non-radiating, aching, moderate intensity  Pain aggravated with motion  Reports limited ROM due to aggravation of pain  Reports intermittent clicking/popping  Denies left arm numbness/tingling, swelling, skin color changes, and other ROS as per below  Follow up right knee pain: improved intensity of pain  Pain still located over anterior knee below her kneecap, described as sharp, non-radiating, intermittent, mild to moderate intensity  Aggravated during prolonged ambulation and squatting  Swelling and bruising resolved  Denies limited ROM, clicking/popping  Denies new injuries since last visit  In regards to pain control, she had used tramadol which provided some relief but it was no better than naproxen/acetaminophen so she has preferred to transition from opioids  Review of Systems   Constitutional: Negative for chills, fatigue and fever  Respiratory: Negative for cough and shortness of breath  Gastrointestinal: Negative for abdominal pain, nausea and vomiting  Musculoskeletal:        As per HPI   Skin: Negative for rash and wound     Neurological:        As per HPI         Medical, Surgical, Family, and Social History    Past Medical History:   Diagnosis Date    Allergic rhinitis     Gets immunotherapy with Dr Yosvany Vicente 3/8/13; RESOLVED 11/10/15     Past Surgical History:   Procedure Laterality Date    ABDOMINAL SURGERY      BLADDER SUSPENSION      BUNIONECTOMY Right     with implants    TOTAL ABDOMINAL HYSTERECTOMY  2006    WITH REMOVAL OF BOTH OVARIES      Social History   Social History     Substance and Sexual Activity   Alcohol Use Yes    Comment: occasional     Social History     Substance and Sexual Activity   Drug Use No     Social History     Tobacco Use   Smoking Status Never Smoker   Smokeless Tobacco Never Used     Family History   Problem Relation Age of Onset    Other Mother         MULTIPLE MYELOMA    Diabetes Father     Melanoma Father  Prostate cancer Father     Diabetes Brother     Hypertension Brother      Allergies   Allergen Reactions    Sulfa Antibiotics     Sulfasalazine           Physical Exam  Ht 5' 6 5" (1 689 m)   Wt 67 6 kg (149 lb)   BMI 23 69 kg/m²     Constitutional:  see vital signs  Gen: well-developed, normocephalic/atraumatic, well-groomed  Eyes: No inflammation or discharge of conjunctiva or lids; sclera clear   Pharynx: no inflammation, lesion, or mass of lips  Neck: supple, no masses, non-distended  MSK: no inflammation, lesion, mass, or clubbing of nails and digits except for other than mentioned below  SKIN: no visible rashes or skin lesions  Pulmonary/Chest: Effort normal  No respiratory distress  NEURO: cranial nerves grossly intact  PSYCH:  Alert and oriented to person, place, and time; recent and remote memory intact; mood normal, no depression, anxiety, or agitation, judgment and insight good and intact     Ortho Exam  Shoulder exam:       LEFT    Inspection Erythema None     Swelling None     Increased Warmth None    Rotator cuff ER Deferred d/t radial neck frx     IR Deferred d/ radial neck frx     Abduction 4+/5    ROM  Limited d/t shoulder pain    Abduction 110 Limited d/t shoulder pain    ER90 50 Limited d/t shouldder pain    IR90 40 Limited d/t shoudler pain   TTP:  +anterior bicipital groove, +greater tuberosity    Special Tests: O'Briens  (FF 90, add 10, resist thumbs up-, resist thumbs down+) Unable to assess slap    Neer Positive     Ramirez Positive        UE NV Exam: +2 Radial pulses bilaterally  Sensation intact to light touch C5-T1 bilaterally    Cervical ROM is full without pain, numbness or tingling  Left Elbow:   Inspection: no swelling, erythema, or increased warmth     ROM  Flexion: 120  Extension: -10   Pronation (from neutral): 40 (w/ pain)  Supination (from neutral): 40 (w/ pain)     Deferred strength d/t fracture     Tenderness: + radial neck     Left Wrist  no swelling, erythema, or increased warmth  Resisted wrist and middle finger extension aggravates pain along dorsal aspect of wrist/forearm/elbow  nontender  Sensation intact     Left Hand  no erythema  Swelling: none  Tenderness: none  rom fingers mcp, pip, dip intact without pain  No digital scissoring or deviation of fingers  no extensor lag  no rotation of fingers  no joint laxity  strength flexion and extension mcp, pip, dip 5/5  sensation intact  capillary refill <2secs      Right Knee Exam:  Erythema: none, only ongoing appropriate healing of superficial abrasions without drainage/erythema  Swelling: none  Increased Warmth: no  Tenderness: +infrapatellar aspect of knee along patellar tendon/prepatellar bursa  ROM: 0-120 (anterior knee pain aggravated during flexion)  Patellar Apprehension: negative  Patellar Grind: negative  Lachman's: negative  Anterior Drawer: negative  Varus laxity: negative  Valgus laxity: negative  Daphne: negative

## 2021-03-15 NOTE — TELEPHONE ENCOUNTER
LM for patient to contact the office  We need authorization to release information signed for this  Patient will need to come in and sign or have one sent to her to sign and return

## 2021-03-16 ENCOUNTER — EVALUATION (OUTPATIENT)
Dept: PHYSICAL THERAPY | Facility: CLINIC | Age: 61
End: 2021-03-16
Payer: COMMERCIAL

## 2021-03-16 DIAGNOSIS — M25.512 ACUTE PAIN OF LEFT SHOULDER: ICD-10-CM

## 2021-03-16 DIAGNOSIS — S52.135A CLOSED NONDISPLACED FRACTURE OF NECK OF LEFT RADIUS, INITIAL ENCOUNTER: ICD-10-CM

## 2021-03-16 DIAGNOSIS — S80.01XD CONTUSION OF RIGHT KNEE, SUBSEQUENT ENCOUNTER: ICD-10-CM

## 2021-03-16 DIAGNOSIS — M25.561 ACUTE PAIN OF RIGHT KNEE: Primary | ICD-10-CM

## 2021-03-16 DIAGNOSIS — M25.522 LEFT ELBOW PAIN: ICD-10-CM

## 2021-03-16 PROCEDURE — 97162 PT EVAL MOD COMPLEX 30 MIN: CPT | Performed by: PHYSICAL THERAPIST

## 2021-03-16 PROCEDURE — 97112 NEUROMUSCULAR REEDUCATION: CPT | Performed by: PHYSICAL THERAPIST

## 2021-03-16 NOTE — PROGRESS NOTES
PT Evaluation     Today's date: 3/16/2021  Patient name: Merle Balderrama  : 1960  MRN: [id number]  Referring provider: Shania Ramires MD  Dx:   Encounter Diagnosis     ICD-10-CM    1  Acute pain of right knee  M25 561 PT plan of care cert/re-cert   2  Closed nondisplaced fracture of neck of left radius, initial encounter  S52 135A Ambulatory referral to Physical Therapy     PT plan of care cert/re-cert   3  Contusion of right knee, subsequent encounter  S80  01XD Ambulatory referral to Physical Therapy     PT plan of care cert/re-cert   4  Left elbow pain  M25 522 PT plan of care cert/re-cert   5  Acute pain of left shoulder  M25 512        Start Time: 1450  Stop Time: 1530  Total time in clinic (min): 40 minutes    Assessment  Assessment details: 63 year female patient reports to PT with L shoulder, and L elbow pain from L radial neck fracture that she sustained from a fall that occurred on 3/2/2021  Patient has R knee pain as well that is improving  Patient presents with decreased L elbow supination ROM, and decreased L shoulder mobility  Patient will benefit from skilled PT services to address current impairments and functional limitations to help patient return to her PLOF  Impairments: abnormal or restricted ROM, abnormal movement, activity intolerance, lacks appropriate home exercise program and pain with function    Symptom irritability: moderateUnderstanding of Dx/Px/POC: good   Prognosis: good    Goals  STG  1  Patient will be independent with completion of HEP throughout therapy  2  Patient will have at worst 7/10 pain so patient can sleep with less discomfort in 3 weeks  3  Patient will have full L elbow supination ROM in 3 weeks  LTG  1  Patient will have full L shoulder elevation AROM so patient can complete overhead activity in 4 weeks  2  Patient will put on shirts and coats without increased difficulty in 6 weeks       Plan  Patient would benefit from: skilled physical therapy  Planned therapy interventions: joint mobilization, manual therapy, neuromuscular re-education, patient education, strengthening, therapeutic activities, stretching, therapeutic exercise, home exercise program, functional ROM exercises and flexibility  Frequency: 2x week  Duration in weeks: 6  Treatment plan discussed with: patient        Subjective Evaluation    History of Present Illness  Mechanism of injury: Patient reports with L radial neck fx, L UE pain, and R knee pain from a fall on 3/2/2021  Patient notes was in full L UE cast for about a week and she got it off about 1 week ago  Patient denies numbness/tingling  Patient has lifting restriction of 5 lbs until sees MD again next week  Patient has difficulty dressing herself with her L UE, using her L UE functionally, but does note it is improving  Patient also has difficulty sleeping due to her symptoms  Patient notes her R knee is healing well  Pain  Current pain rating: 3  At best pain ratin  At worst pain ratin  Quality: dull ache  Relieving factors: medications and change in position  Aggravating factors: overhead activity, lifting and stair climbing    Treatments  Current treatment: physical therapy  Patient Goals  Patient goals for therapy: decreased pain, increased motion, increased strength and return to sport/leisure activities          Objective     Active Range of Motion   Left Shoulder   Flexion: 150 degrees with pain  Abduction: 150 degrees with pain    Right Shoulder   Flexion: WFL  Abduction: WFL    Passive Range of Motion   Left Shoulder   Flexion: 150 degrees WFL and with pain  Abduction: 150 degrees WFL and with pain  External rotation 90°: 60 degrees with pain  Internal rotation 90°: 45 degrees with pain    Right Shoulder   Flexion: WFL  Abduction: WFL  External rotation 90°: 90 degrees   Internal rotation 90°: 70 degrees     Tests     Left Shoulder   Negative drop arm and external rotation lag sign       General Comments:      Elbow Comments   L elbow flexion/extension full and painless  L elbow pronation full and painless  L elbow supination minimally limited and painful    Strength testing not completed       Flowsheet Rows      Most Recent Value   PT/OT G-Codes   Current Score  40   Projected Score  65             Precautions: prior L shoulder pain prior to fall       Manuals 3/16            L shoulder PROM in all directions             scap mobs                          L shoulder/neck STM As needed             Neuro Re-Ed                                                                                                        Ther Ex             Supine shoulder posterior capsule stretch unable            sidelying shoulder ER reviewed            Wall slides reviewed             L elbow supination/pronation reviewed             UBE                                                     Ther Activity                                       Gait Training                                       Modalities

## 2021-03-17 ENCOUNTER — OFFICE VISIT (OUTPATIENT)
Dept: PHYSICAL THERAPY | Facility: CLINIC | Age: 61
End: 2021-03-17
Payer: COMMERCIAL

## 2021-03-17 DIAGNOSIS — M25.512 ACUTE PAIN OF LEFT SHOULDER: Primary | ICD-10-CM

## 2021-03-17 DIAGNOSIS — M25.522 LEFT ELBOW PAIN: ICD-10-CM

## 2021-03-17 DIAGNOSIS — S52.135A CLOSED NONDISPLACED FRACTURE OF NECK OF LEFT RADIUS, INITIAL ENCOUNTER: ICD-10-CM

## 2021-03-17 DIAGNOSIS — M25.561 ACUTE PAIN OF RIGHT KNEE: ICD-10-CM

## 2021-03-17 DIAGNOSIS — S80.01XD CONTUSION OF RIGHT KNEE, SUBSEQUENT ENCOUNTER: ICD-10-CM

## 2021-03-17 PROCEDURE — 97110 THERAPEUTIC EXERCISES: CPT | Performed by: PHYSICAL THERAPIST

## 2021-03-17 PROCEDURE — 97140 MANUAL THERAPY 1/> REGIONS: CPT | Performed by: PHYSICAL THERAPIST

## 2021-03-17 PROCEDURE — 97112 NEUROMUSCULAR REEDUCATION: CPT | Performed by: PHYSICAL THERAPIST

## 2021-03-17 NOTE — PROGRESS NOTES
Daily Note     Today's date: 3/17/2021  Patient name: Nora Dickey  : 1960  MRN: [id number]  Referring provider: Sancho Barakat MD  Dx:   Encounter Diagnosis     ICD-10-CM    1  Acute pain of left shoulder  M25 512    2  Closed nondisplaced fracture of neck of left radius, initial encounter  S52 135A    3  Contusion of right knee, subsequent encounter  S80  01XD    4  Acute pain of right knee  M25 561    5  Left elbow pain  M25 522                   Subjective: Patient notes L shoulder is the most sore since yesterday's eval  Is a little sore in elbow as well  Objective: See treatment diary below      Assessment: Tolerated treatment well  Patient would benefit from continued PT  Patient responded well to scap mobs and posterior mobs for increasing shoulder ROM  ER most limited and irritable  L elbow is lacking extension so manuals completed to address and HEP updated  Plan: Progress treatment as tolerated         Precautions: prior L shoulder pain prior to fall       Manuals 3/16 3/17           L shoulder PROM in all directions  [address]           [address]osterior mobs   [address]           L shoulder/neck STM As needed  Lat  [address]           Elbow extension PROM/bicep STM  [address]           Neuro Re-Ed                                                                                                        Ther Ex             Supine shoulder posterior capsule stretch unable            sidelying shoulder ER reviewed 2x10           Wall slides reviewed  15x           L elbow supination/pronation reviewed             UBE   4 min            pulleys  15x           Elbow extension on wall  5x 5" holds 2 dir                         Ther Activity                                       Gait Training                                       Modalities

## 2021-03-23 ENCOUNTER — OFFICE VISIT (OUTPATIENT)
Dept: PHYSICAL THERAPY | Facility: CLINIC | Age: 61
End: 2021-03-23
Payer: COMMERCIAL

## 2021-03-23 DIAGNOSIS — M25.512 ACUTE PAIN OF LEFT SHOULDER: Primary | ICD-10-CM

## 2021-03-23 DIAGNOSIS — S80.01XD CONTUSION OF RIGHT KNEE, SUBSEQUENT ENCOUNTER: ICD-10-CM

## 2021-03-23 DIAGNOSIS — M25.561 ACUTE PAIN OF RIGHT KNEE: ICD-10-CM

## 2021-03-23 DIAGNOSIS — S52.135A CLOSED NONDISPLACED FRACTURE OF NECK OF LEFT RADIUS, INITIAL ENCOUNTER: ICD-10-CM

## 2021-03-23 DIAGNOSIS — M25.522 LEFT ELBOW PAIN: ICD-10-CM

## 2021-03-23 PROCEDURE — 97112 NEUROMUSCULAR REEDUCATION: CPT | Performed by: PHYSICAL THERAPIST

## 2021-03-23 PROCEDURE — 97140 MANUAL THERAPY 1/> REGIONS: CPT | Performed by: PHYSICAL THERAPIST

## 2021-03-23 PROCEDURE — 97110 THERAPEUTIC EXERCISES: CPT | Performed by: PHYSICAL THERAPIST

## 2021-03-23 NOTE — PROGRESS NOTES
Daily Note     Today's date: 3/23/2021  Patient name: Delmar Valdez  : 1960  MRN: [id number]  Referring provider: Javon Braxton MD  Dx:   Encounter Diagnosis     ICD-10-CM    1  Acute pain of left shoulder  M25 512    2  Closed nondisplaced fracture of neck of left radius, initial encounter  S52 135A    3  Contusion of right knee, subsequent encounter  S80  01XD    4  Acute pain of right knee  M25 561    5  Left elbow pain  M25 522        Start Time: 1100          Subjective: Patient notes L shoulder felt great after last visit  Objective: See treatment diary below      Assessment: Tolerated treatment well  Patient would benefit from continued PT  Patient responded well to scap mobs and posterior mobs for increasing shoulder ROM  ER much better this visit, especially after mobs  IR more limited this visit  L elbow able to achieve full extension post manuals  Plan: Progress treatment as tolerated         Precautions: prior L shoulder pain prior to fall       Manuals 3/16 3/17 3/23          L shoulder PROM in all directions   [address]   [address]          Posterior mobs   [address]          L shoulder/neck STM As needed  Lat  [address]  [address]           Elbow extension PROM/bicep STM   [address]          Neuro Re-Ed                                                                                                        Ther Ex             Supine shoulder posterior capsule stretch unable            sidelying shoulder ER reviewed 2x10 2x12           Wall slides reviewed  15x 15x           L elbow supination/pronation reviewed             UBE   4 min  4 min           pulleys  15x 15x           Elbow extension on wall  5x 5" holds 2 dir  5x 5" holds 2 dir                        Ther Activity                                       Gait Training                                       Modalities

## 2021-03-25 ENCOUNTER — OFFICE VISIT (OUTPATIENT)
Dept: PHYSICAL THERAPY | Facility: CLINIC | Age: 61
End: 2021-03-25
Payer: COMMERCIAL

## 2021-03-25 DIAGNOSIS — S52.135A CLOSED NONDISPLACED FRACTURE OF NECK OF LEFT RADIUS, INITIAL ENCOUNTER: ICD-10-CM

## 2021-03-25 DIAGNOSIS — M25.522 LEFT ELBOW PAIN: ICD-10-CM

## 2021-03-25 DIAGNOSIS — M25.512 ACUTE PAIN OF LEFT SHOULDER: Primary | ICD-10-CM

## 2021-03-25 DIAGNOSIS — M25.561 ACUTE PAIN OF RIGHT KNEE: ICD-10-CM

## 2021-03-25 DIAGNOSIS — S80.01XD CONTUSION OF RIGHT KNEE, SUBSEQUENT ENCOUNTER: ICD-10-CM

## 2021-03-25 PROCEDURE — 97140 MANUAL THERAPY 1/> REGIONS: CPT | Performed by: PHYSICAL THERAPIST

## 2021-03-25 PROCEDURE — 97112 NEUROMUSCULAR REEDUCATION: CPT | Performed by: PHYSICAL THERAPIST

## 2021-03-25 PROCEDURE — 97110 THERAPEUTIC EXERCISES: CPT | Performed by: PHYSICAL THERAPIST

## 2021-03-25 NOTE — PROGRESS NOTES
Daily Note     Today's date: 3/25/2021  Patient name: Claudene Deutscher  : 1960  MRN: [id number]  Referring provider: Marce Vagn MD  Dx:   Encounter Diagnosis     ICD-10-CM    1  Acute pain of left shoulder  M25 512    2  Closed nondisplaced fracture of neck of left radius, initial encounter  S52 135A    3  Contusion of right knee, subsequent encounter  S80  01XD    4  Acute pain of right knee  M25 561    5  Left elbow pain  M25 522        Start Time: 1100          Subjective: Patient notes L shoulder felt good after last visit but most of the day yesterday it was sore  Objective: See treatment diary below     R thoracic rotation moderately limited  L thoracic mobility painful     Assessment: Tolerated treatment well  Patient would benefit from continued PT  Patient has full elbow extension, just had slightly irritable distal bicep, which was relieved post manuals  L shoulder ROM almost full in all directions  Thoracic region limited with R rotation but more symptoms on L  Plan: Progress treatment as tolerated         Precautions: prior L shoulder pain prior to fall       Manuals 3/16 3/17 3/23 3/25         L shoulder PROM in all directions  [facility] NORTH         scap [facility] NORTH         Posterior [facility] shoulder/neck STM As needed  [facility]         Elbow extension PROM/bicep STM  [address] [facility] NORTH         Thoracic [facility]         Neuro Re-Ed                                                                                                        Ther Ex             Supine shoulder posterior capsule stretch unable            sidelying shoulder ER reviewed 2x10 2x12  3x10          Wall slides reviewed  15x 15x  15x          L elbow supination/pronation reviewed             UBE   4 min  4 min  4 min         pulleys  15x 15x  15x          Elbow extension on wall  5x 5" holds 2 dir  5x 5" holds 2 dir           Thoracic rotations    10x B          Ther Activity Gait Training                                       Modalities

## 2021-03-30 ENCOUNTER — OFFICE VISIT (OUTPATIENT)
Dept: PHYSICAL THERAPY | Facility: CLINIC | Age: 61
End: 2021-03-30
Payer: COMMERCIAL

## 2021-03-30 DIAGNOSIS — S52.135A CLOSED NONDISPLACED FRACTURE OF NECK OF LEFT RADIUS, INITIAL ENCOUNTER: ICD-10-CM

## 2021-03-30 DIAGNOSIS — M25.512 ACUTE PAIN OF LEFT SHOULDER: Primary | ICD-10-CM

## 2021-03-30 DIAGNOSIS — S80.01XD CONTUSION OF RIGHT KNEE, SUBSEQUENT ENCOUNTER: ICD-10-CM

## 2021-03-30 DIAGNOSIS — M25.561 ACUTE PAIN OF RIGHT KNEE: ICD-10-CM

## 2021-03-30 DIAGNOSIS — M25.522 LEFT ELBOW PAIN: ICD-10-CM

## 2021-03-30 PROCEDURE — 97140 MANUAL THERAPY 1/> REGIONS: CPT | Performed by: PHYSICAL THERAPIST

## 2021-03-30 PROCEDURE — 97110 THERAPEUTIC EXERCISES: CPT | Performed by: PHYSICAL THERAPIST

## 2021-03-30 PROCEDURE — 97112 NEUROMUSCULAR REEDUCATION: CPT | Performed by: PHYSICAL THERAPIST

## 2021-03-30 NOTE — PROGRESS NOTES
Daily Note     Today's date: 3/30/2021  Patient name: Hawa Jane  : 1960  MRN: [id number]  Referring provider: Arcadio Bennett MD  Dx:   Encounter Diagnosis     ICD-10-CM    1  Acute pain of left shoulder  M25 512    2  Closed nondisplaced fracture of neck of left radius, initial encounter  S52 135A    3  Contusion of right knee, subsequent encounter  S80  01XD    4  Acute pain of right knee  M25 561    5  Left elbow pain  M25 522        Start Time: 1100          Subjective: Patient notes improved function of shoulder  Still slightly tight reaching overhead  Objective: See treatment diary below     3/25: R thoracic rotation moderately limited  L thoracic mobility painful     Assessment: Tolerated treatment well  Patient would benefit from continued PT  Patient has full elbow ROM  Improving with shoulder ROM in all directions  Still tight into near full ROM, most likely due to hypomobile upper thoracic and lower cervical region  Plan: Progress treatment as tolerated         Precautions: prior L shoulder pain prior to fall       Manuals 3/16 3/17 3/23 3/25 3/30         L shoulder PROM in all directions  [facility] NORTH        scap [facility]        Posterior [facility] shoulder/neck STM As needed  [facility]         Elbow extension PROM/bicep STM  [address] [facility] NORTH         Thoracic [facility]         Neuro Re-Ed                                                                                                        Ther Ex             Supine shoulder posterior capsule stretch unable     nv       sidelying shoulder ER reviewed 2x10 2x12  3x10  3x12        Wall slides reviewed  15x 15x  15x  15x         L elbow supination/pronation reviewed             UBE   4 min  4 min  4 min 4 min         pulleys  15x 15x  15x  15x        Elbow extension on wall  5x 5" holds 2 dir  5x 5" holds 2 dir           Thoracic rotations    10x B  10x B         Seated CT butterfly stretch 10x 3" holds         Ther Activity                                       Gait Training                                       Modalities

## 2021-03-31 ENCOUNTER — OFFICE VISIT (OUTPATIENT)
Dept: OBGYN CLINIC | Facility: MEDICAL CENTER | Age: 61
End: 2021-03-31
Payer: COMMERCIAL

## 2021-03-31 DIAGNOSIS — S52.135A CLOSED NONDISPLACED FRACTURE OF NECK OF LEFT RADIUS, INITIAL ENCOUNTER: Primary | ICD-10-CM

## 2021-03-31 DIAGNOSIS — M75.22 BICIPITAL TENDINITIS OF LEFT SHOULDER: ICD-10-CM

## 2021-03-31 DIAGNOSIS — M25.512 CHRONIC LEFT SHOULDER PAIN: ICD-10-CM

## 2021-03-31 DIAGNOSIS — S80.01XD CONTUSION OF RIGHT KNEE, SUBSEQUENT ENCOUNTER: ICD-10-CM

## 2021-03-31 DIAGNOSIS — G89.29 CHRONIC LEFT SHOULDER PAIN: ICD-10-CM

## 2021-03-31 PROCEDURE — 99213 OFFICE O/P EST LOW 20 MIN: CPT | Performed by: STUDENT IN AN ORGANIZED HEALTH CARE EDUCATION/TRAINING PROGRAM

## 2021-03-31 PROCEDURE — 3008F BODY MASS INDEX DOCD: CPT | Performed by: STUDENT IN AN ORGANIZED HEALTH CARE EDUCATION/TRAINING PROGRAM

## 2021-03-31 NOTE — PROGRESS NOTES
1  Closed nondisplaced fracture of neck of left radius, initial encounter     2  Contusion of right knee, subsequent encounter     3  Bicipital tendinitis of left shoulder     4  Chronic left shoulder pain       No orders of the defined types were placed in this encounter  Imaging Studies (I personally reviewed images in PACS and report):    Prior imagin  CT cervical spine w/o contrast 2021: No cervical spine fracture or traumatic malalignment   Mild degenerative changes particularly C5-6 and C6-7   2   CT facial bones without contrast 2021:  Normal noncontrast CT of the facial bones   Mild periorbital soft tissue swelling on the left  3  CT head without contrast 2021: No acute intracranial abnormalities  4  X-ray left humerus 2021: No acute osseous abnormality  5  X-ray left shoulder 2021: No acute osseous abnormality  6  X-ray left elbow 2021:  Elbow effusion noted  Octavio Low is a nondisplaced slightly impacted radial neck fracture     7  X-ray left forearm 2021:  Again, notes the left sided minimally displaced radial neck fracture  8  X-ray left wrist 2021: No acute osseous abnormality there is a small ossific density adjacent to the ulnar styloid that appears corticated and favored to represent an ossicle or chronic avulsion   Clinical correlation with site of pain is recommended  (Note: non-tender on exam today)  9  X-ray right knee 2021: Small joint effusion   No acute fracture  IMPRESSION:  1  Left-sided forearm pain - improving  Minimally displaced radial neck fracture      2  Acute right anterior knee pain - resolved  DDx: Right knee contusion, prepatellar bursitis     3  Acute left shoulder pain - improving  DDx: rotator cuff strain, subacromial bursitis, bicipital strain//tendinitis, referred pain from her neck, shoulder contusion     4  Acute neck pain - improving     5   History of precipitating mechanical fall on ground level leading to conditions as above     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)    Date of injury: 03/02/2021  Follow up interval: 4 weeks, 1 day    PLAN:  Repeat X-ray next visit:   Left elbow x-ray  - Continue physical therapy without strengthening of LULETICIA  Patient will follow up in 2 weeks, at which point she will be 6 weeks post-injury; I will repeat left elbow XR to evaluate radial neck fracture - if radiographic interval healing is shown we will start strengthening exercises with PT of her LUE  - In regards to her right knee, patient reports and demonstrates clinical resolution    Return in about 2 weeks (around 4/14/2021)  CHIEF COMPLAINT:  Follow up multiple injuries    HPI:  Yary Gaona is a 63 y o  female  who presents for       Visit 03/31/2021 : Follow-up left radial neck fracture:  Almost complete resolution of elbow and forearm pain  She has been compliant with formal PT for this issue and has reports full range of motion of her elbow  She has not undergone strength training yet until cleared  She denies numbness/ tingling, elbow bruising, swelling, and other ROS as per below  She denies use of pain medications  Follow-up left shoulder pain:  Improving  She has been compliant with formal PT and has complete range of motion of her shoulder and is almost symmetrical to her right side  She has not undergone strength training yet until cleared  She does intermittently have pain over the lateral aspect and anterior aspect of her shoulder described as aching, mild intensity, nonradiating  Pain is mostly aggravated towards the end of the day she is using her left upper extremity more often while doing her exercises  She denies new injuries since last visit  Denies clicking/ popping of her left shoulder, numbness/ tingling of her left upper extremity, bruising, and other ROS as per below  She denies use of pain medications for this issue      Follow-up right knee pain: Resolved -  She feels complete resolution of this pain since last visit  Patient reports full range of motion and ability to weightbear on her right lower extremity  Denies swelling, numbness/tingling, skin redness, knee locking in extension, instability  Review of Systems   Constitutional: Negative for chills, fatigue and fever  Respiratory: Negative for cough and shortness of breath  Gastrointestinal: Negative for abdominal pain, nausea and vomiting  Musculoskeletal:        As per HPI   Skin: Negative for rash and wound     Neurological:        As per HPI         Medical, Surgical, Family, and Social History    Past Medical History:   Diagnosis Date    Allergic rhinitis     Gets immunotherapy with Dr Tarsha Cevallos 3/8/13; RESOLVED 11/10/15     Past Surgical History:   Procedure Laterality Date    ABDOMINAL SURGERY      BLADDER SUSPENSION      BUNIONECTOMY Right     with implants    TOTAL ABDOMINAL HYSTERECTOMY  2006    WITH REMOVAL OF BOTH OVARIES      Social History   Social History     Substance and Sexual Activity   Alcohol Use Yes    Comment: occasional     Social History     Substance and Sexual Activity   Drug Use No     Social History     Tobacco Use   Smoking Status Never Smoker   Smokeless Tobacco Never Used     Family History   Problem Relation Age of Onset    Other Mother         MULTIPLE MYELOMA    Diabetes Father     Melanoma Father     Prostate cancer Father     Diabetes Brother     Hypertension Brother      Allergies   Allergen Reactions    Sulfa Antibiotics     Sulfasalazine           Physical Exam  /88   Ht 5' 6 5" (1 689 m)   Wt 67 6 kg (149 lb)   BMI 23 69 kg/m²     Constitutional:  see vital signs  Gen: well-developed, normocephalic/atraumatic, well-groomed  Eyes: No inflammation or discharge of conjunctiva or lids; sclera clear   Pharynx: no inflammation, lesion, or mass of lips  Neck: supple, no masses, non-distended  MSK: no inflammation, lesion, mass, or clubbing of nails and digits except for other than mentioned below  SKIN: no visible rashes or skin lesions  Pulmonary/Chest: Effort normal  No respiratory distress  NEURO: cranial nerves grossly intact  PSYCH:  Alert and oriented to person, place, and time; recent and remote memory intact; mood normal, no depression, anxiety, or agitation, judgment and insight good and intact     Ortho Exam  Shoulder exam:       LEFT    Inspection Erythema none     Swelling none     Increased Warmth none    Rotator cuff ER Deferred d/t radial neck frx     IR Deferred d/t radial neck frx     Abduction Deferred d/t radial neck frx    ROM      Abduction 170     ER0 60     ER90 90     IR90 40     IRb T7 (symmetric with contralateral arm)    TTP:  +anterior bicipital groove, +greater tuberosity    Special Tests:       Neer Positive Less intense pain than before    Ramirez Positive Less intense pain than beore       UE NV Exam: +2 Radial pulses   Sensation intact to light touch C5-T1 bilaterally    OK sign (median): intact b/l  Froment (ulnar): intact b/l  Thumb extension (radial) 5/5 b/l     Left Elbow:   Inspection: no swelling, erythema, or increased warmth     ROM  Flexion: Full/symmetric with contralateral arm (150)  Extension: 0  Pronation (from neutral): full/intact  Supination (from neutral): full/intact     Deferred strength d/t fracture     Tenderness: non-tender at site of radial neck fracture     Left Wrist  no swelling, erythema, or increased warmth  Resisted wrist and middle finger extension does not aggravate pain or over her forearm /elbow per patient  nontender  Sensation intact     Left Hand  no erythema  Swelling: none  Tenderness: none  rom fingers mcp, pip, dip intact without pain  No digital scissoring or deviation of fingers  no extensor lag  no rotation of fingers  no joint laxity  strength flexion and extension mcp, pip, dip 5/5  sensation intact  capillary refill <2secs        Right Knee Exam:  Erythema: none  Swelling: none  Increased Warmth: no  Tenderness: none  ROM: 0-130 (no pain)  Patellar Apprehension: negative  Patellar Grind: negative  Lachman's: negative  Anterior Drawer: negative  Varus laxity: negative  Valgus laxity: negative  Daphne: negative     Gait: normal without antalgia

## 2021-04-01 ENCOUNTER — OFFICE VISIT (OUTPATIENT)
Dept: PHYSICAL THERAPY | Facility: CLINIC | Age: 61
End: 2021-04-01
Payer: COMMERCIAL

## 2021-04-01 DIAGNOSIS — M25.561 ACUTE PAIN OF RIGHT KNEE: ICD-10-CM

## 2021-04-01 DIAGNOSIS — M25.522 LEFT ELBOW PAIN: Primary | ICD-10-CM

## 2021-04-01 DIAGNOSIS — S80.01XD CONTUSION OF RIGHT KNEE, SUBSEQUENT ENCOUNTER: ICD-10-CM

## 2021-04-01 DIAGNOSIS — S52.135A CLOSED NONDISPLACED FRACTURE OF NECK OF LEFT RADIUS, INITIAL ENCOUNTER: ICD-10-CM

## 2021-04-01 DIAGNOSIS — M25.512 ACUTE PAIN OF LEFT SHOULDER: ICD-10-CM

## 2021-04-01 PROCEDURE — 97110 THERAPEUTIC EXERCISES: CPT | Performed by: PHYSICAL THERAPIST

## 2021-04-01 PROCEDURE — 97112 NEUROMUSCULAR REEDUCATION: CPT | Performed by: PHYSICAL THERAPIST

## 2021-04-01 PROCEDURE — 97140 MANUAL THERAPY 1/> REGIONS: CPT | Performed by: PHYSICAL THERAPIST

## 2021-04-01 NOTE — PROGRESS NOTES
Daily Note     Today's date: 2021  Patient name: Adele Oppenheim  : 1960  MRN: [id number]  Referring provider: Deepali Ash MD  Dx:   Encounter Diagnosis     ICD-10-CM    1  Left elbow pain  M25 522    2  Acute pain of left shoulder  M25 512    3  Closed nondisplaced fracture of neck of left radius, initial encounter  S52 135A    4  Contusion of right knee, subsequent encounter  S80  01XD    5  Acute pain of right knee  M25 561        Start Time: 1100          Subjective: Patient notes improved function of shoulder  Still slightly tight reaching overhead  Saw MD yesterday, still wants to do no weight training due to making sure her elbow heals ok       Objective: See treatment diary below     3/25: R thoracic rotation moderately limited  L thoracic mobility painful     : slight L second rib hypomobility     Assessment: Tolerated treatment well  Patient would benefit from continued PT  Patient has full elbow ROM  Improving with shoulder ROM in all directions and no limitations into end range flexion PROM  Functional scapular strengthening initiated this visit without pain  Plan: Progress treatment as tolerated         Precautions: prior L shoulder pain prior to fall       Manuals 3/16 3/17 3/23 3/25 3/30  4/1       L shoulder PROM in all directions  [facility] NORTH       scap [facility] NORTH       Posterior [facility] NORTH       L shoulder/neck STM As needed  Lat [facility] NORTH         Elbow extension PROM/bicep STM  [address] [facility] NORTH         Thoracic [facility] NORTH L MK upper  L second rib        Neuro Re-Ed             Wall slides w/ lift off      10x        Serratus wall slides      8x                                                                         Ther Ex             Supine shoulder posterior capsule stretch unable            sidelying shoulder ER reviewed 2x10 2x12  3x10  3x12 3x12       Wall slides reviewed  15x 15x  15x  15x  5x       L elbow supination/pronation reviewed UBE   4 min  4 min  4 min 4 min  4 min       pulleys  15x 15x  15x  15x 15x        Elbow extension on wall  5x 5" holds 2 dir  5x 5" holds 2 dir           Thoracic rotations    10x B  10x B  12x B        Seated CT butterfly stretch     10x 3" holds  10x 3" holds        Ther Activity                                       Gait Training                                       Modalities

## 2021-04-02 VITALS
WEIGHT: 149 LBS | HEIGHT: 67 IN | SYSTOLIC BLOOD PRESSURE: 128 MMHG | BODY MASS INDEX: 23.39 KG/M2 | DIASTOLIC BLOOD PRESSURE: 88 MMHG

## 2021-04-06 ENCOUNTER — APPOINTMENT (OUTPATIENT)
Dept: PHYSICAL THERAPY | Facility: CLINIC | Age: 61
End: 2021-04-06
Payer: COMMERCIAL

## 2021-04-07 ENCOUNTER — OFFICE VISIT (OUTPATIENT)
Dept: PHYSICAL THERAPY | Facility: CLINIC | Age: 61
End: 2021-04-07
Payer: COMMERCIAL

## 2021-04-07 DIAGNOSIS — M25.522 LEFT ELBOW PAIN: Primary | ICD-10-CM

## 2021-04-07 DIAGNOSIS — M25.512 ACUTE PAIN OF LEFT SHOULDER: ICD-10-CM

## 2021-04-07 DIAGNOSIS — S52.135A CLOSED NONDISPLACED FRACTURE OF NECK OF LEFT RADIUS, INITIAL ENCOUNTER: ICD-10-CM

## 2021-04-07 DIAGNOSIS — M25.561 ACUTE PAIN OF RIGHT KNEE: ICD-10-CM

## 2021-04-07 DIAGNOSIS — S80.01XD CONTUSION OF RIGHT KNEE, SUBSEQUENT ENCOUNTER: ICD-10-CM

## 2021-04-07 PROCEDURE — 97140 MANUAL THERAPY 1/> REGIONS: CPT | Performed by: PHYSICAL THERAPIST

## 2021-04-07 PROCEDURE — 97112 NEUROMUSCULAR REEDUCATION: CPT | Performed by: PHYSICAL THERAPIST

## 2021-04-07 PROCEDURE — 97110 THERAPEUTIC EXERCISES: CPT | Performed by: PHYSICAL THERAPIST

## 2021-04-07 NOTE — PROGRESS NOTES
Daily Note     Today's date: 2021  Patient name: Denise Rhodes  : 1960  MRN: [id number]  Referring provider: Raquel Castillo MD  Dx:   Encounter Diagnosis     ICD-10-CM    1  Left elbow pain  M25 522    2  Acute pain of left shoulder  M25 512    3  Closed nondisplaced fracture of neck of left radius, initial encounter  S52 135A    4  Contusion of right knee, subsequent encounter  S80  01XD    5  Acute pain of right knee  M25 561        Start Time: 1000          Subjective: Patient notes did a lot of moving things yesterday and woke up with more pain  Had no pain doing the activity though  Objective: See treatment diary below     3/25: R thoracic rotation moderately limited  L thoracic mobility painful     : slight L second rib hypomobility     Assessment: Tolerated treatment well  Patient would benefit from continued PT  Patient has full elbow ROM  Patient has approaching full shoulder PROM  More strengthening exercises for the shoulder incorporated this visit without pain  Plan: Progress treatment as tolerated         Precautions: prior L shoulder pain prior to fall       Manuals 3/16 3/17 3/23 3/25 3/30  4/1 4/7      L shoulder PROM in all directions  [address]      scap mobs  [address]      Posterior mobs  [address]      L shoulder/neck STM As needed  Lat [address]  [address]         Elbow extension PROM/bicep STM  [address]         Thoracic mobs    [address] L MK upper  L second rib        Neuro Re-Ed             Wall slides w/ lift off      10x  12x       Serratus wall slides      8x  10x       Supine serratus punches       12x 3" holds                                                           Ther Ex             Supine shoulder posterior capsule stretch unable            sidelying shoulder ER reviewed 2x10 2x12  3x10  3x12 3x12 6x, 9x  1 lb       Wall slides reviewed  15x 15x  15x  15x  5x D/C      L elbow supination/pronation reviewed             UBE   4 min  4 min  4 min 4 min  4 min 4 min       pulleys  15x 15x  15x  15x 15x  15x       Elbow extension on wall  5x 5" holds 2 dir  5x 5" holds 2 dir           Thoracic rotations    10x B  10x B  12x B  12x B       Seated CT butterfly stretch     10x 3" holds  10x 3" holds  10x 3" holds       Prone low trap retraction       2x12                    Standing straight arm pull downs       2x10 grn       Ther Activity                                       Gait Training                                       Modalities

## 2021-04-08 ENCOUNTER — OFFICE VISIT (OUTPATIENT)
Dept: PHYSICAL THERAPY | Facility: CLINIC | Age: 61
End: 2021-04-08
Payer: COMMERCIAL

## 2021-04-08 DIAGNOSIS — M25.561 ACUTE PAIN OF RIGHT KNEE: Primary | ICD-10-CM

## 2021-04-08 DIAGNOSIS — M25.522 LEFT ELBOW PAIN: ICD-10-CM

## 2021-04-08 DIAGNOSIS — M25.512 ACUTE PAIN OF LEFT SHOULDER: ICD-10-CM

## 2021-04-08 PROCEDURE — 97110 THERAPEUTIC EXERCISES: CPT | Performed by: PHYSICAL THERAPIST

## 2021-04-08 PROCEDURE — 97112 NEUROMUSCULAR REEDUCATION: CPT | Performed by: PHYSICAL THERAPIST

## 2021-04-08 PROCEDURE — 97140 MANUAL THERAPY 1/> REGIONS: CPT | Performed by: PHYSICAL THERAPIST

## 2021-04-08 NOTE — PROGRESS NOTES
Daily Note     Today's date: 2021  Patient name: Gabby Ortiz  : 1960  MRN: [id number]  Referring provider: Kezia Linda MD  Dx:   Encounter Diagnosis     ICD-10-CM    1  Acute pain of right knee  M25 561    2  Left elbow pain  M25 522    3  Acute pain of left shoulder  M25 512                   Subjective: Patient notes didn't have to use heat right away this morning  Objective: See treatment diary below     3/25: R thoracic rotation moderately limited - better  L thoracic mobility painful - better    : slight L second rib hypomobility     Assessment: Tolerated treatment well  Patient would benefit from continued PT  Patient has full elbow ROM  Patient approaching full shoulder PROM even pre manuals this visit  Strengthening exercises continued  Plan: Progress treatment as tolerated         Precautions: prior L shoulder pain prior to fall       Manuals 3/30  4/1 4/7 4/8     L shoulder PROM in all directions [address]     scap mobs [address]     Posterior mobs [address]     L shoulder/neck STM         subscap ER length assess     nv    Thoracic mobs [address] upper  L second rib        Neuro Re-Ed         Wall slides w/ lift off  10x  12x  12x      Serratus wall slides  8x  10x  10x      Supine serratus punches   12x 3" holds  2x8 3" holds 1 lb                                          Ther Ex         Supine shoulder posterior capsule stretch         sidelying shoulder ER 3x12 3x12 6x, 9x  1 lb  2x10 1 lb      Wall slides 15x  5x D/C      L elbow supination/pronation         UBE  4 min  4 min 4 min  4 min      pulleys 15x 15x  15x  15x     Elbow extension on wall         Thoracic rotations 10x B  12x B  12x B  12x  B     Seated CT butterfly stretch 10x 3" holds  10x 3" holds  10x 3" holds  10x 3" holds      Prone low trap retraction   2x12  2x12               Standing straight arm pull downs   2x10 grn  2x10 grn      Ther Activity                           Gait Training Modalities

## 2021-04-13 ENCOUNTER — OFFICE VISIT (OUTPATIENT)
Dept: PHYSICAL THERAPY | Facility: CLINIC | Age: 61
End: 2021-04-13
Payer: COMMERCIAL

## 2021-04-13 DIAGNOSIS — S80.01XD CONTUSION OF RIGHT KNEE, SUBSEQUENT ENCOUNTER: ICD-10-CM

## 2021-04-13 DIAGNOSIS — M25.522 LEFT ELBOW PAIN: ICD-10-CM

## 2021-04-13 DIAGNOSIS — M25.561 ACUTE PAIN OF RIGHT KNEE: Primary | ICD-10-CM

## 2021-04-13 DIAGNOSIS — S52.135A CLOSED NONDISPLACED FRACTURE OF NECK OF LEFT RADIUS, INITIAL ENCOUNTER: ICD-10-CM

## 2021-04-13 DIAGNOSIS — M25.512 ACUTE PAIN OF LEFT SHOULDER: ICD-10-CM

## 2021-04-13 PROCEDURE — 97110 THERAPEUTIC EXERCISES: CPT | Performed by: PHYSICAL THERAPIST

## 2021-04-13 PROCEDURE — 97140 MANUAL THERAPY 1/> REGIONS: CPT | Performed by: PHYSICAL THERAPIST

## 2021-04-13 PROCEDURE — 97112 NEUROMUSCULAR REEDUCATION: CPT | Performed by: PHYSICAL THERAPIST

## 2021-04-13 NOTE — PROGRESS NOTES
Daily Note     Today's date: 2021  Patient name: Denise Rhodes  : 1960  MRN: [id number]  Referring provider: Raquel Castillo MD  Dx:   Encounter Diagnosis     ICD-10-CM    1  Acute pain of right knee  M25 561    2  Left elbow pain  M25 522    3  Acute pain of left shoulder  M25 512    4  Closed nondisplaced fracture of neck of left radius, initial encounter  S52 135A    5  Contusion of right knee, subsequent encounter  S80  01XD        Start Time: 1015          Subjective: Patient notes woke up one morning recently with more elbow pain and shoulder pain but noted she was gripping the pillow a lot in her bed  Notes continued functional improvement in her shoulder symptoms but still has difficulty with putting on long sleeve shirts  Objective: See treatment diary below     3/25: R thoracic rotation moderately limited - better  L thoracic mobility painful - better    : slight L second rib hypomobility     Assessment: Tolerated treatment well  Patient would benefit from continued PT  Patient has full elbow ROM despite having increased pain in it transiently recently  Patient has full PROM post manuals and had some irritability to L subscap  Strengthening exercises continued  Plan: Progress treatment as tolerated         Precautions: prior L shoulder pain prior to fall       Manuals 3/30  4/1 4/7 4/8 4/13    L shoulder PROM in all directions [address]    scap mobs [address]    Posterior mobs [address]    L shoulder/neck STM         subscap ER length assess     [address]    Thoracic mobs [address] upper  L second rib        Neuro Re-Ed         Wall slides w/ lift off  10x  12x  12x  2x8     Serratus wall slides  8x  10x  10x  10x     Supine serratus punches   12x 3" holds  2x8 3" holds 1 lb  2x8 3" holds 2 lbs                                         Ther Ex         Supine shoulder posterior capsule stretch         sidelying shoulder ER 3x12 3x12 6x, 9x  1 lb  2x10 1 lb  2x10 1 lb     Wall slides 15x  5x D/C      L elbow supination/pronation         UBE  4 min  4 min 4 min  4 min  4 min     pulleys 15x 15x  15x  15x 15x     Elbow extension on wall         Thoracic rotations 10x B  12x B  12x B  12x  B 12x B    Seated CT butterfly stretch 10x 3" holds  10x 3" holds  10x 3" holds  10x 3" holds  12x 3" holds     Prone low trap retraction   2x12  2x12  2x12 w/ 3" holds              Standing straight arm pull downs   2x10 grn  2x10 grn  2x12 grn     Ther Activity                           Gait Training                           Modalities

## 2021-04-14 ENCOUNTER — APPOINTMENT (OUTPATIENT)
Dept: RADIOLOGY | Facility: MEDICAL CENTER | Age: 61
End: 2021-04-14
Payer: COMMERCIAL

## 2021-04-14 ENCOUNTER — OFFICE VISIT (OUTPATIENT)
Dept: OBGYN CLINIC | Facility: MEDICAL CENTER | Age: 61
End: 2021-04-14
Payer: COMMERCIAL

## 2021-04-14 VITALS
SYSTOLIC BLOOD PRESSURE: 128 MMHG | DIASTOLIC BLOOD PRESSURE: 80 MMHG | TEMPERATURE: 97.2 F | HEART RATE: 88 BPM | HEIGHT: 67 IN | WEIGHT: 149 LBS | BODY MASS INDEX: 23.39 KG/M2

## 2021-04-14 DIAGNOSIS — M25.512 CHRONIC LEFT SHOULDER PAIN: ICD-10-CM

## 2021-04-14 DIAGNOSIS — G89.29 CHRONIC LEFT SHOULDER PAIN: ICD-10-CM

## 2021-04-14 DIAGNOSIS — S52.135A CLOSED NONDISPLACED FRACTURE OF NECK OF LEFT RADIUS, INITIAL ENCOUNTER: ICD-10-CM

## 2021-04-14 DIAGNOSIS — Z91.81 HISTORY OF FALL: ICD-10-CM

## 2021-04-14 DIAGNOSIS — S52.135A CLOSED NONDISPLACED FRACTURE OF NECK OF LEFT RADIUS, INITIAL ENCOUNTER: Primary | ICD-10-CM

## 2021-04-14 PROCEDURE — 73080 X-RAY EXAM OF ELBOW: CPT

## 2021-04-14 PROCEDURE — 1036F TOBACCO NON-USER: CPT | Performed by: STUDENT IN AN ORGANIZED HEALTH CARE EDUCATION/TRAINING PROGRAM

## 2021-04-14 PROCEDURE — 3008F BODY MASS INDEX DOCD: CPT | Performed by: STUDENT IN AN ORGANIZED HEALTH CARE EDUCATION/TRAINING PROGRAM

## 2021-04-14 PROCEDURE — 99213 OFFICE O/P EST LOW 20 MIN: CPT | Performed by: STUDENT IN AN ORGANIZED HEALTH CARE EDUCATION/TRAINING PROGRAM

## 2021-04-14 NOTE — PROGRESS NOTES
1  Closed nondisplaced fracture of neck of left radius, initial encounter  XR elbow 3+ vw left   2  Chronic left shoulder pain     3  History of fall       Orders Placed This Encounter   Procedures    XR elbow 3+ vw left        Imaging Studies (I personally reviewed images in PACS and report): 1  X-ray left elbow 2021:  Redemonstrates nondisplaced slightly impacted radial neck fracture in her feel healing as evidenced by obscuration of fracture line and callus formation  Will follow-up official radiology interpretation  Prior imagin  CT cervical spine w/o contrast 2021: No cervical spine fracture or traumatic malalignment   Mild degenerative changes particularly C5-6 and C6-7   2   CT facial bones without contrast 2021:  Normal noncontrast CT of the facial bones   Mild periorbital soft tissue swelling on the left  3  CT head without contrast 2021: No acute intracranial abnormalities  4  X-ray left humerus 2021: No acute osseous abnormality  5  X-ray left shoulder 2021: No acute osseous abnormality  6  X-ray left elbow 2021:  Elbow effusion noted  Faustina  is a nondisplaced slightly impacted radial neck fracture     7  X-ray left forearm 2021:  Again, notes the left sided minimally displaced radial neck fracture  8  X-ray left wrist 2021: No acute osseous abnormality there is a small ossific density adjacent to the ulnar styloid that appears corticated and favored to represent an ossicle or chronic avulsion   Clinical correlation with site of pain is recommended  (Note: non-tender on exam today)  9  X-ray right knee 2021: Small joint effusion   No acute fracture      IMPRESSION:  1    Left-sided nondisplaced radial neck fracture -  Full range of motion, minimal to no pain, no interval change of fracture with interval healing seen on radiographs today     2  Acute left shoulder pain - improving with full ROM  DDx: rotator cuff strain, subacromial bursitis, bicipital strain//tendinitis, referred pain from her neck, shoulder contusion     3  History of precipitating mechanical fall on ground level leading to conditions as above     Occupation: [employer]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)     Date of injury: 03/02/2021  Follow up interval: 6 weeks, 1 day    PLAN:  Clinical exam and radiographic imaging reviewed with patient today with, with impression as per above  Clinically and radiologically she appears to be healing appropriately from her nondisplaced radial neck fracture  As it has been 6 weeks post injury and patient demonstrating full range of motion with only mild discomfort over elbow, I feel she can start start resistance exercises with the left upper extremity as tolerated with formal physical therapy  I counseled she can discuss with her therapist when to transition from formal to a home exercise program     Return in about 6 weeks (around 5/26/2021)  CHIEF COMPLAINT:  Follow up left elbow fracture and left shoulder pain    HPI:  Yary Gaona is a 63 y o  female  who presents for       Visit 04/14/2021 : Follow-up left radial neck fracture and left shoulder pain: patient reports minimal discomfort to no pain of her left elbow since last visit  She continues to see formal PT and continues of full range of motion of her left elbow as well as her left shoulder  She denies any new injuries  She denies swelling, bruising, numbness /tingling, clicking/popping, and other ROS as per below  She has not started resistance exercises yet  Denies daily pain medication use  Review of Systems   Constitutional: Negative for chills, fatigue and fever  Respiratory: Negative for cough and shortness of breath  Gastrointestinal: Negative for abdominal pain, nausea and vomiting  Musculoskeletal:        As per HPI   Skin: Negative for rash and wound     Neurological:        As per HPI         Medical, Surgical, Family, and Social History    Past Medical History:   Diagnosis Date    Allergic rhinitis     Gets immunotherapy with Dr Oskar Mcnulty 3/8/13; RESOLVED 11/10/15     Past Surgical History:   Procedure Laterality Date    ABDOMINAL SURGERY      BLADDER SUSPENSION      BUNIONECTOMY Right     with implants    TOTAL ABDOMINAL HYSTERECTOMY  2006    WITH REMOVAL OF BOTH OVARIES      Social History   Social History     Substance and Sexual Activity   Alcohol Use Yes    Comment: occasional     Social History     Substance and Sexual Activity   Drug Use No     Social History     Tobacco Use   Smoking Status Never Smoker   Smokeless Tobacco Never Used     Family History   Problem Relation Age of Onset    Other Mother         MULTIPLE MYELOMA    Diabetes Father     Melanoma Father     Prostate cancer Father     Diabetes Brother     Hypertension Brother      Allergies   Allergen Reactions    Sulfa Antibiotics     Sulfasalazine           Physical Exam  /80 (BP Location: Right arm, Patient Position: Sitting, Cuff Size: Standard)   Pulse 88   Temp (!) 97 2 °F (36 2 °C) (Temporal)   Ht 5' 6 5" (1 689 m)   Wt 67 6 kg (149 lb)   BMI 23 69 kg/m²     Constitutional:  see vital signs  Gen: well-developed, normocephalic/atraumatic, well-groomed  Eyes: No inflammation or discharge of conjunctiva or lids; sclera clear   Pharynx: no inflammation, lesion, or mass of lips  Neck: supple, no masses, non-distended  MSK: no inflammation, lesion, mass, or clubbing of nails and digits except for other than mentioned below  SKIN: no visible rashes or skin lesions  Pulmonary/Chest: Effort normal  No respiratory distress     NEURO: cranial nerves grossly intact  PSYCH:  Alert and oriented to person, place, and time; recent and remote memory intact; mood normal, no depression, anxiety, or agitation, judgment and insight good and intact     Ortho Exam  Elbow focused exam:               LEFT   ROM:   full   Palpation: Effusion negative     Tenderness radial head (mild discomfort)    Increased warmth none        Instability: Varus/valgus at 0 and 30 degrees stable        Special Tests: Milking maneuver Negative     Cubital tunnel Tinel's test Negative     UE NV Exam: +2 Radial pulses bilaterally  Sensation intact to light touch C5-T1 bilaterally, Radial/median/ulnar nerve motor intact    Left shoulder, wrist/hand, and forearm ROM full, painless with passive ROM, no ttp or crepitance throughout extremities above wrist joint

## 2021-04-15 ENCOUNTER — OFFICE VISIT (OUTPATIENT)
Dept: PHYSICAL THERAPY | Facility: CLINIC | Age: 61
End: 2021-04-15
Payer: COMMERCIAL

## 2021-04-15 DIAGNOSIS — M25.512 ACUTE PAIN OF LEFT SHOULDER: ICD-10-CM

## 2021-04-15 DIAGNOSIS — M25.522 LEFT ELBOW PAIN: ICD-10-CM

## 2021-04-15 DIAGNOSIS — M25.561 ACUTE PAIN OF RIGHT KNEE: Primary | ICD-10-CM

## 2021-04-15 PROCEDURE — 97112 NEUROMUSCULAR REEDUCATION: CPT | Performed by: PHYSICAL THERAPIST

## 2021-04-15 PROCEDURE — 97140 MANUAL THERAPY 1/> REGIONS: CPT | Performed by: PHYSICAL THERAPIST

## 2021-04-15 PROCEDURE — 97110 THERAPEUTIC EXERCISES: CPT | Performed by: PHYSICAL THERAPIST

## 2021-04-15 NOTE — PROGRESS NOTES
Daily Note     Today's date: 4/15/2021  Patient name: Juliana Schreiber  : 1960  MRN: [id number]  Referring provider: Darcy Evans MD  Dx:   Encounter Diagnosis     ICD-10-CM    1  Acute pain of right knee  M25 561    2  Left elbow pain  M25 522    3  Acute pain of left shoulder  M25 512        Start Time: 1020          Subjective: Patient notes continued progress in shoulder and has been having no difficulty with elbow at home  Objective: See treatment diary below     3/25: R thoracic rotation moderately limited - better  L thoracic mobility painful - better    : slight L second rib hypomobility     Assessment: Tolerated treatment well  Patient would benefit from continued PT  Strengthening exercises continued  Plan: Progress treatment as tolerated         Precautions: prior L shoulder pain prior to fall       Manuals 3/30  4/1 4/7 4/8 4/13 4/15   L shoulder PROM in all directions [facility]   scap [facility]   Posterior [facility]   L shoulder/neck STM         subscap ER length assess     [facility]    Thoracic [facility] upper  L second rib        Neuro Re-Ed         Wall slides w/ lift off  10x  12x  12x  2x8  2x10    Serratus wall slides  8x  10x  10x  10x  12x    Supine serratus punches   12x 3" holds  2x8 3" holds 1 lb  2x8 3" holds 2 lbs  2x10 3" holds 2 lbs                                        Ther Ex         Supine shoulder posterior capsule stretch         sidelying shoulder ER 3x12 3x12 6x, 9x  1 lb  2x10 1 lb  2x10 1 lb  2x10 1 lb    Wall slides 15x  5x D/C      L elbow supination/pronation         UBE  4 min  4 min 4 min  4 min  4 min  4 min    pulleys 15x 15x  15x  15x 15x  15x    Elbow extension on wall         Thoracic rotations 10x B  12x B  12x B  12x  B 12x B 12x B   Seated CT butterfly stretch 10x 3" holds  10x 3" holds  10x 3" holds  10x 3" holds  12x 3" holds  12x 3" holds    Prone low trap retraction   2x12  2x12  2x12 w/ 3" holds  2x12 w/ 3" holds             Standing straight arm pull downs   2x10 grn  2x10 grn  2x12 grn  2x12 grn    Ther Activity                           Gait Training                           Modalities

## 2021-04-20 ENCOUNTER — OFFICE VISIT (OUTPATIENT)
Dept: PHYSICAL THERAPY | Facility: CLINIC | Age: 61
End: 2021-04-20
Payer: COMMERCIAL

## 2021-04-20 DIAGNOSIS — M25.522 LEFT ELBOW PAIN: ICD-10-CM

## 2021-04-20 DIAGNOSIS — M25.512 ACUTE PAIN OF LEFT SHOULDER: ICD-10-CM

## 2021-04-20 DIAGNOSIS — M25.561 ACUTE PAIN OF RIGHT KNEE: Primary | ICD-10-CM

## 2021-04-20 PROCEDURE — 97140 MANUAL THERAPY 1/> REGIONS: CPT | Performed by: PHYSICAL THERAPIST

## 2021-04-20 PROCEDURE — 97110 THERAPEUTIC EXERCISES: CPT | Performed by: PHYSICAL THERAPIST

## 2021-04-20 PROCEDURE — 97112 NEUROMUSCULAR REEDUCATION: CPT | Performed by: PHYSICAL THERAPIST

## 2021-04-20 NOTE — PROGRESS NOTES
PT Discharge Note     Today's date: 2021  Patient name: Adele Oppenheim  : 1960  MRN: [id number]  Referring provider: Deepali Ash MD  Dx:   Encounter Diagnosis     ICD-10-CM    1  Acute pain of right knee  M25 561    2  Left elbow pain  M25 522    3  Acute pain of left shoulder  M25 512        Start Time: 1020          Subjective: Patient notes continued progress in shoulder and has been having no difficulty with elbow at home  Objective: See treatment diary below    Assessment: Tolerated treatment well  Patient  is being discharged due to meeting all functional goals and showing ability to self manage her symptoms  Patient educated to continue HEP on a regular basis to help prevent symptoms from recurring        Plan: Discharge     Precautions: prior L shoulder pain prior to fall       Manuals 3/30  4/1 4/7 4/8 4/13 4/15 4/20   L shoulder PROM in all directions [address]   scap mobs [address]   Posterior mobs [address]   L shoulder/neck STM          subscap ER length assess     [address]     Thoracic mobs [address] upper  L second rib         Neuro Re-Ed          Wall slides w/ lift off  10x  12x  12x  2x8  2x10  2x10    Serratus wall slides  8x  10x  10x  10x  12x  12x    Supine serratus punches   12x 3" holds  2x8 3" holds 1 lb  2x8 3" holds 2 lbs  2x10 3" holds 2 lbs  2x10 3" holds 2 lbs                                            Ther Ex          Supine shoulder posterior capsule stretch          sidelying shoulder ER 3x12 3x12 6x, 9x  1 lb  2x10 1 lb  2x10 1 lb  2x10 1 lb  2x10 1 lb    Wall slides 15x  5x D/C       L elbow supination/pronation          UBE  4 min  4 min 4 min  4 min  4 min  4 min  4 min    pulleys 15x 15x  15x  15x 15x  15x  15x    Elbow extension on wall          Thoracic rotations 10x B  12x B  12x B  12x  B 12x B 12x B 12x B   Seated CT butterfly stretch 10x 3" holds  10x 3" holds  10x 3" holds  10x 3" holds  12x 3" holds  12x 3" holds  12x 3" holds    Prone low trap retraction   2x12  2x12  2x12 w/ 3" holds  2x12 w/ 3" holds  2x12 w/ 3" holds              Standing straight arm pull downs   2x10 grn  2x10 grn  2x12 grn  2x12 grn  3x10 grn    Ther Activity                              Gait Training                              Modalities

## 2021-05-12 ENCOUNTER — OFFICE VISIT (OUTPATIENT)
Dept: OBGYN CLINIC | Facility: MEDICAL CENTER | Age: 61
End: 2021-05-12
Payer: COMMERCIAL

## 2021-05-12 VITALS
HEART RATE: 79 BPM | HEIGHT: 67 IN | SYSTOLIC BLOOD PRESSURE: 116 MMHG | WEIGHT: 149 LBS | TEMPERATURE: 97.5 F | BODY MASS INDEX: 23.39 KG/M2 | DIASTOLIC BLOOD PRESSURE: 80 MMHG

## 2021-05-12 DIAGNOSIS — S52.135D CLOSED NONDISPLACED FRACTURE OF NECK OF LEFT RADIUS WITH ROUTINE HEALING, SUBSEQUENT ENCOUNTER: Primary | ICD-10-CM

## 2021-05-12 DIAGNOSIS — M25.512 CHRONIC LEFT SHOULDER PAIN: ICD-10-CM

## 2021-05-12 DIAGNOSIS — G89.29 CHRONIC LEFT SHOULDER PAIN: ICD-10-CM

## 2021-05-12 DIAGNOSIS — Z91.81 HISTORY OF FALL: ICD-10-CM

## 2021-05-12 PROCEDURE — 99213 OFFICE O/P EST LOW 20 MIN: CPT | Performed by: STUDENT IN AN ORGANIZED HEALTH CARE EDUCATION/TRAINING PROGRAM

## 2021-05-12 PROCEDURE — 3008F BODY MASS INDEX DOCD: CPT | Performed by: STUDENT IN AN ORGANIZED HEALTH CARE EDUCATION/TRAINING PROGRAM

## 2021-05-12 PROCEDURE — 1036F TOBACCO NON-USER: CPT | Performed by: STUDENT IN AN ORGANIZED HEALTH CARE EDUCATION/TRAINING PROGRAM

## 2021-05-12 NOTE — PROGRESS NOTES
1  Closed nondisplaced fracture of neck of left radius with routine healing, subsequent encounter     2  Chronic left shoulder pain     3  History of fall       No orders of the defined types were placed in this encounter  Imaging Studies (I personally reviewed images in PACS and report):    Prior imagin  X-ray left elbow 2021:   Redemonstrates fracture of the radial neck with mild distraction of the fracture line as compared to prior study on 2021  There is some healing bony bridging identified  1  CT cervical spine w/o contrast 2021: No cervical spine fracture or traumatic malalignment   Mild degenerative changes particularly C5-6 and C6-7   2   CT facial bones without contrast 2021:  Normal noncontrast CT of the facial bones   Mild periorbital soft tissue swelling on the left  3  CT head without contrast 2021: No acute intracranial abnormalities  4  X-ray left humerus 2021: No acute osseous abnormality  5  X-ray left shoulder 2021: No acute osseous abnormality  6  X-ray left elbow 2021:  Elbow effusion noted  Faith Gallegos is a nondisplaced slightly impacted radial neck fracture     7  X-ray left forearm 2021:  Again, notes the left sided minimally displaced radial neck fracture  8  X-ray left wrist 2021: No acute osseous abnormality there is a small ossific density adjacent to the ulnar styloid that appears corticated and favored to represent an ossicle or chronic avulsion   Clinical correlation with site of pain is recommended  (Note: non-tender on exam today)  9  X-ray right knee 2021: Small joint effusion   No acute fracture  IMPRESSION:  1    Left-sided nondisplaced radial neck fracture -  Full range of motion, no pain, elbow flexion/extension/supination/pronation 5/5 strength, ongoing healing radiologically from last imaging of elbow     2  Acute left shoulder pain - Mild discomfort over lateral shoulder but tolerable   Full shoulder ROM and strength on exam  DDx: rotator cuff strain, subacromial bursitis, bicipital strain//tendinitis     3  History of precipitating mechanical fall on ground level leading to conditions as above     Occupation: [employer]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)     Date of injury: 03/02/2021  Follow up interval: 10 weeks    PLAN:  Clinical exam and radiographic imaging reviewed with patient today, with impression as per above  Patient has been progressing appropriately since last visit with head resistance to physical therapy  At this time she has already transitioned to a home exercise program   She reports ability to accomplish tasks at work and at home without issue and demonstrates full shoulder and elbow range of motion with 5/5 strength on exam  Patient to follow up as needed  Return if symptoms worsen or fail to improve  CHIEF COMPLAINT:  Follow up left elbow fracture and left shoulder pain    HPI:  Whitley Flores is a 63 y o  female  who presents for       Visit 05/12/2021 : Follow up left radial neck fracture and left shoulder pain:   Improved and almost completely resolved  Patient reports she has transition from formal PT to home exercise program   She feels that her strength is symmetrical to her contralateral side is been able to accomplish tasks of lifting/pushing / pulling with her bilateral upper extremities  She only reports mild discomfort over the lateral aspect of her shoulder but this is progressively improving as well  She reports full range of motion of her left shoulder and elbow without difficulty  She denies new injuries since last visit  She denies regular use of pain medications  She denies left upper extremity numbness/tingling, swelling, bruising, and other ROS as per below  She reports she has been able to lie on her left shoulder to sleep without issue  Review of Systems   Constitutional: Negative for chills, fatigue and fever     Respiratory: Negative for cough and shortness of breath  Gastrointestinal: Negative for abdominal pain, nausea and vomiting  Musculoskeletal:        As per HPI   Skin: Negative for rash and wound  Neurological:        As per HPI         Medical, Surgical, Family, and Social History    Past Medical History:   Diagnosis Date    Allergic rhinitis     Gets immunotherapy with Dr Chalino Babcock 3/8/13; RESOLVED 11/10/15     Past Surgical History:   Procedure Laterality Date    ABDOMINAL SURGERY      BLADDER SUSPENSION      BUNIONECTOMY Right     with implants    TOTAL ABDOMINAL HYSTERECTOMY  2006    WITH REMOVAL OF BOTH OVARIES      Social History   Social History     Substance and Sexual Activity   Alcohol Use Yes    Comment: occasional     Social History     Substance and Sexual Activity   Drug Use No     Social History     Tobacco Use   Smoking Status Never Smoker   Smokeless Tobacco Never Used     Family History   Problem Relation Age of Onset    Other Mother         MULTIPLE MYELOMA    Diabetes Father     Melanoma Father     Prostate cancer Father     Diabetes Brother     Hypertension Brother      Allergies   Allergen Reactions    Sulfa Antibiotics     Sulfasalazine           Physical Exam  /80   Pulse 79   Temp 97 5 °F (36 4 °C)   Ht 5' 6 5" (1 689 m)   Wt 67 6 kg (149 lb)   BMI 23 69 kg/m²     Constitutional:  see vital signs  Gen: well-developed, normocephalic/atraumatic, well-groomed  Eyes: No inflammation or discharge of conjunctiva or lids; sclera clear   Pharynx: no inflammation, lesion, or mass of lips  Neck: supple, no masses, non-distended  MSK: no inflammation, lesion, mass, or clubbing of nails and digits except for other than mentioned below  SKIN: no visible rashes or skin lesions  Pulmonary/Chest: Effort normal  No respiratory distress     NEURO: cranial nerves grossly intact  PSYCH:  Alert and oriented to person, place, and time; recent and remote memory intact; mood normal, no depression, anxiety, or agitation, judgment and insight good and intact     Ortho Exam  Shoulder exam:      LEFT    Inspection Erythema none     Swelling none     Increased Warmth none    Rotator cuff ER 5/5     IR 5/5     Abduction 5/5    ROM      Abduction 170     ER0 60     ER90 90     IR90 40     IRb T7    TTP:      Special Tests: O'Briens  (FF 90, add 10, resist thumbs up-, resist thumbs down+) Negative     Speeds  Negative     Yergason's Negative     Maikel's Negative     Neer Negative     Ramirez Negative        UE NV Exam: +2 Radial pulses   Sensation intact to light touch C5-T1, Radial/median/ulnar nerve motor intact    Elbow focused exam:                          LEFT    ROM:   full    Palpation: Effusion negative      Tenderness none      Increased warmth none             Instability: Varus/valgus at 0 and 30 degrees stable             Special Tests: Milking maneuver Negative      Cubital tunnel Tinel's test Negative

## 2021-09-17 ENCOUNTER — TELEPHONE (OUTPATIENT)
Dept: ADMINISTRATIVE | Facility: OTHER | Age: 61
End: 2021-09-17

## 2021-09-17 NOTE — TELEPHONE ENCOUNTER
----- Message from Leticia Perez sent at 9/16/2021  7:03 PM EDT -----  Regarding: mammo update  09/16/21 7:03 PM    Julien, our patient Dilma Lama has had Mammogram completed/performed  Please assist in updating the patient chart by pulling the Care Everywhere (CE) document  The date of service is 8/19/21         Thank you,  Leticia Perez  [facility] [address]

## 2021-09-17 NOTE — TELEPHONE ENCOUNTER
Upon review of the In Basket request we were able to locate, review, and update the patient chart as requested for Mammogram     Any additional questions or concerns should be emailed to the Practice Liaisons via [email or web address] email, please do not reply via In Basket      Thank you  Marla Trejo

## 2021-10-20 ENCOUNTER — OFFICE VISIT (OUTPATIENT)
Dept: FAMILY MEDICINE CLINIC | Facility: CLINIC | Age: 61
End: 2021-10-20
Payer: COMMERCIAL

## 2021-10-20 VITALS
SYSTOLIC BLOOD PRESSURE: 122 MMHG | RESPIRATION RATE: 18 BRPM | TEMPERATURE: 97.3 F | HEART RATE: 83 BPM | BODY MASS INDEX: 24.11 KG/M2 | OXYGEN SATURATION: 99 % | HEIGHT: 67 IN | DIASTOLIC BLOOD PRESSURE: 82 MMHG | WEIGHT: 153.6 LBS

## 2021-10-20 DIAGNOSIS — Z00.00 ANNUAL PHYSICAL EXAM: Primary | ICD-10-CM

## 2021-10-20 DIAGNOSIS — Z13.220 SCREENING FOR HYPERLIPIDEMIA: ICD-10-CM

## 2021-10-20 DIAGNOSIS — Z23 NEED FOR INFLUENZA VACCINATION: ICD-10-CM

## 2021-10-20 DIAGNOSIS — R53.83 FATIGUE, UNSPECIFIED TYPE: ICD-10-CM

## 2021-10-20 DIAGNOSIS — E55.9 VITAMIN D DEFICIENCY: ICD-10-CM

## 2021-10-20 DIAGNOSIS — Z13.1 SCREENING FOR DIABETES MELLITUS: ICD-10-CM

## 2021-10-20 DIAGNOSIS — R10.30 LOWER ABDOMINAL PAIN: ICD-10-CM

## 2021-10-20 DIAGNOSIS — Z11.4 SCREENING FOR HIV (HUMAN IMMUNODEFICIENCY VIRUS): ICD-10-CM

## 2021-10-20 PROCEDURE — 90471 IMMUNIZATION ADMIN: CPT

## 2021-10-20 PROCEDURE — 1036F TOBACCO NON-USER: CPT | Performed by: FAMILY MEDICINE

## 2021-10-20 PROCEDURE — 99396 PREV VISIT EST AGE 40-64: CPT | Performed by: FAMILY MEDICINE

## 2021-10-20 PROCEDURE — 90682 RIV4 VACC RECOMBINANT DNA IM: CPT

## 2021-10-20 PROCEDURE — 3008F BODY MASS INDEX DOCD: CPT | Performed by: FAMILY MEDICINE

## 2021-10-20 PROCEDURE — 3725F SCREEN DEPRESSION PERFORMED: CPT | Performed by: FAMILY MEDICINE

## 2021-11-11 ENCOUNTER — TELEPHONE (OUTPATIENT)
Dept: FAMILY MEDICINE CLINIC | Facility: CLINIC | Age: 61
End: 2021-11-11

## 2021-11-11 DIAGNOSIS — Z20.822 CLOSE EXPOSURE TO COVID-19 VIRUS: Primary | ICD-10-CM

## 2021-11-11 PROCEDURE — 0241U HB NFCT DS VIR RESP RNA 4 TRGT: CPT | Performed by: FAMILY MEDICINE

## 2021-11-16 ENCOUNTER — TELEMEDICINE (OUTPATIENT)
Dept: FAMILY MEDICINE CLINIC | Facility: CLINIC | Age: 61
End: 2021-11-16
Payer: COMMERCIAL

## 2021-11-16 VITALS — OXYGEN SATURATION: 97 % | TEMPERATURE: 98.6 F

## 2021-11-16 DIAGNOSIS — U07.1 COVID-19 VIRUS INFECTION: Primary | ICD-10-CM

## 2021-11-16 PROCEDURE — 1036F TOBACCO NON-USER: CPT | Performed by: FAMILY MEDICINE

## 2021-11-16 PROCEDURE — 99213 OFFICE O/P EST LOW 20 MIN: CPT | Performed by: FAMILY MEDICINE

## 2021-12-27 ENCOUNTER — TELEPHONE (OUTPATIENT)
Dept: FAMILY MEDICINE CLINIC | Facility: CLINIC | Age: 61
End: 2021-12-27

## 2021-12-27 DIAGNOSIS — Z20.822 EXPOSURE TO COVID-19 VIRUS: Primary | ICD-10-CM

## 2021-12-27 PROCEDURE — U0005 INFEC AGEN DETEC AMPLI PROBE: HCPCS | Performed by: FAMILY MEDICINE

## 2021-12-27 PROCEDURE — U0003 INFECTIOUS AGENT DETECTION BY NUCLEIC ACID (DNA OR RNA); SEVERE ACUTE RESPIRATORY SYNDROME CORONAVIRUS 2 (SARS-COV-2) (CORONAVIRUS DISEASE [COVID-19]), AMPLIFIED PROBE TECHNIQUE, MAKING USE OF HIGH THROUGHPUT TECHNOLOGIES AS DESCRIBED BY CMS-2020-01-R: HCPCS | Performed by: FAMILY MEDICINE

## 2022-03-12 LAB
25(OH)D3+25(OH)D2 SERPL-MCNC: 27.8 NG/ML (ref 30–100)
ALBUMIN SERPL-MCNC: 4.4 G/DL (ref 3.8–4.9)
ALBUMIN/GLOB SERPL: 2.4 {RATIO} (ref 1.2–2.2)
ALP SERPL-CCNC: 77 IU/L (ref 44–121)
ALT SERPL-CCNC: 14 IU/L (ref 0–32)
AST SERPL-CCNC: 20 IU/L (ref 0–40)
BASOPHILS # BLD AUTO: 0 X10E3/UL (ref 0–0.2)
BASOPHILS NFR BLD AUTO: 1 %
BILIRUB SERPL-MCNC: 0.3 MG/DL (ref 0–1.2)
BUN SERPL-MCNC: 12 MG/DL (ref 6–24)
BUN/CREAT SERPL: 15 (ref 9–23)
CALCIUM SERPL-MCNC: 9.3 MG/DL (ref 8.7–10.2)
CHLORIDE SERPL-SCNC: 104 MMOL/L (ref 96–106)
CHOLEST SERPL-MCNC: 219 MG/DL (ref 100–199)
CO2 SERPL-SCNC: 21 MMOL/L (ref 20–29)
CREAT SERPL-MCNC: 0.78 MG/DL (ref 0.57–1)
EGFR: 87 ML/MIN/1.73
EOSINOPHIL # BLD AUTO: 0.2 X10E3/UL (ref 0–0.4)
EOSINOPHIL NFR BLD AUTO: 5 %
ERYTHROCYTE [DISTWIDTH] IN BLOOD BY AUTOMATED COUNT: 13.1 % (ref 11.7–15.4)
EST. AVERAGE GLUCOSE BLD GHB EST-MCNC: 114 MG/DL
GLOBULIN SER-MCNC: 1.8 G/DL (ref 1.5–4.5)
GLUCOSE SERPL-MCNC: 96 MG/DL (ref 65–99)
HBA1C MFR BLD: 5.6 % (ref 4.8–5.6)
HCT VFR BLD AUTO: 34.1 % (ref 34–46.6)
HDLC SERPL-MCNC: 79 MG/DL
HGB BLD-MCNC: 11.9 G/DL (ref 11.1–15.9)
HIV 1+2 AB+HIV1 P24 AG SERPL QL IA: NON REACTIVE
IMM GRANULOCYTES # BLD: 0 X10E3/UL (ref 0–0.1)
IMM GRANULOCYTES NFR BLD: 0 %
LDLC SERPL CALC-MCNC: 130 MG/DL (ref 0–99)
LYMPHOCYTES # BLD AUTO: 1.2 X10E3/UL (ref 0.7–3.1)
LYMPHOCYTES NFR BLD AUTO: 29 %
MCH RBC QN AUTO: 31.3 PG (ref 26.6–33)
MCHC RBC AUTO-ENTMCNC: 34.9 G/DL (ref 31.5–35.7)
MCV RBC AUTO: 90 FL (ref 79–97)
MONOCYTES # BLD AUTO: 0.3 X10E3/UL (ref 0.1–0.9)
MONOCYTES NFR BLD AUTO: 7 %
NEUTROPHILS # BLD AUTO: 2.3 X10E3/UL (ref 1.4–7)
NEUTROPHILS NFR BLD AUTO: 58 %
PLATELET # BLD AUTO: 212 X10E3/UL (ref 150–450)
POTASSIUM SERPL-SCNC: 4.2 MMOL/L (ref 3.5–5.2)
PROT SERPL-MCNC: 6.2 G/DL (ref 6–8.5)
RBC # BLD AUTO: 3.8 X10E6/UL (ref 3.77–5.28)
SL AMB VLDL CHOLESTEROL CALC: 10 MG/DL (ref 5–40)
SODIUM SERPL-SCNC: 144 MMOL/L (ref 134–144)
TRIGL SERPL-MCNC: 59 MG/DL (ref 0–149)
TSH SERPL DL<=0.005 MIU/L-ACNC: 1.02 UIU/ML (ref 0.45–4.5)
WBC # BLD AUTO: 4 X10E3/UL (ref 3.4–10.8)

## 2022-05-03 ENCOUNTER — OFFICE VISIT (OUTPATIENT)
Dept: URGENT CARE | Facility: MEDICAL CENTER | Age: 62
End: 2022-05-03
Payer: COMMERCIAL

## 2022-05-03 VITALS
TEMPERATURE: 96.7 F | OXYGEN SATURATION: 95 % | DIASTOLIC BLOOD PRESSURE: 82 MMHG | RESPIRATION RATE: 16 BRPM | HEART RATE: 86 BPM | SYSTOLIC BLOOD PRESSURE: 139 MMHG

## 2022-05-03 DIAGNOSIS — K08.89 PAIN, DENTAL: Primary | ICD-10-CM

## 2022-05-03 PROCEDURE — S9083 URGENT CARE CENTER GLOBAL: HCPCS | Performed by: EMERGENCY MEDICINE

## 2022-05-03 PROCEDURE — G0382 LEV 3 HOSP TYPE B ED VISIT: HCPCS | Performed by: EMERGENCY MEDICINE

## 2022-05-03 RX ORDER — PENICILLIN V POTASSIUM 500 MG/1
500 TABLET ORAL EVERY 6 HOURS SCHEDULED
Qty: 40 TABLET | Refills: 0 | Status: SHIPPED | OUTPATIENT
Start: 2022-05-03 | End: 2022-05-13

## 2022-05-03 NOTE — PATIENT INSTRUCTIONS
Toothache   WHAT YOU NEED TO KNOW:   A toothache is pain that is caused by irritation of the nerves in the center of your tooth  The irritation may be caused by several problems, such as a cavity, an infection, a cracked tooth, or gum disease  DISCHARGE INSTRUCTIONS:   Return to the emergency department if:   · You have trouble breathing or swallowing  · You have swelling in your face or neck  Contact your dentist if:   · You have a fever and chills  · You have trouble opening or closing your mouth  · You have swelling around your tooth  · You have questions or concerns about your condition or care  Medicines: You may  need any of the following:  · NSAIDs , such as ibuprofen, help decrease swelling, pain, and fever  This medicine is available with or without a doctor's order  NSAIDs can cause stomach bleeding or kidney problems in certain people  If you take blood thinner medicine, always ask if NSAIDs are safe for you  Always read the medicine label and follow directions  Do not give these medicines to children under 10months of age without direction from your child's healthcare provider  · Acetaminophen  decreases pain and fever  It is available without a doctor's order  Ask how much to take and how often to take it  Follow directions  Acetaminophen can cause liver damage if not taken correctly  · Prescription pain medicine  may be given  Ask your healthcare provider how to take this medicine safely  Some prescription pain medicines contain acetaminophen  Do not take other medicines that contain acetaminophen without talking to your healthcare provider  Too much acetaminophen may cause liver damage  Prescription pain medicine may cause constipation  Ask your healthcare provider how to prevent or treat constipation  · Antibiotics  help treat or prevent a bacterial infection  · Take your medicine as directed    Contact your healthcare provider if you think your medicine is not helping or if you have side effects  Tell him of her if you are allergic to any medicine  Keep a list of the medicines, vitamins, and herbs you take  Include the amounts, and when and why you take them  Bring the list or the pill bottles to follow-up visits  Carry your medicine list with you in case of an emergency  Self-care:   · Rinse your mouth with warm salt water  4 times a day or as directed  · Eat soft foods  to help relieve pain caused by chewing  · Apply ice on your jaw or cheek  for 15 to 20 minutes every hour or as directed  Use an ice pack, or put crushed ice in a plastic bag  Cover it with a towel before you apply it  Ice helps prevent tissue damage and decreases swelling and pain  Help prevent a toothache:   · Brush your teeth at least 2 times a day  · Use dental floss to clean between your teeth at least 1 time a day  · See your dentist regularly every 6 months for dental cleanings and oral exams  Follow up with your dentist as directed: You may be referred to a dental surgeon  Write down your questions so you remember to ask them during your visits  © Copyright [employer] 2022 Information is for End User's use only and may not be sold, redistributed or otherwise used for commercial purposes  All illustrations and images included in CareNotes® are the copyrighted property of [employer]  or [employer]  The above information is an  only  It is not intended as medical advice for individual conditions or treatments  Talk to your doctor, nurse or pharmacist before following any medical regimen to see if it is safe and effective for you

## 2022-05-03 NOTE — PROGRESS NOTES
[facility]        NAME: Jacinto Rodrigues is a 63 y o  female  : 1960    MRN: [id number]  DATE: May 3, 2022  TIME: 10:43 AM    Assessment and Plan   Pain, dental [K08 89]  1  Pain, dental  penicillin V potassium (VEETID) 500 mg tablet         Patient Instructions     Toothache   WHAT YOU NEED TO KNOW:   A toothache is pain that is caused by irritation of the nerves in the center of your tooth  The irritation may be caused by several problems, such as a cavity, an infection, a cracked tooth, or gum disease  DISCHARGE INSTRUCTIONS:   Return to the emergency department if:   · You have trouble breathing or swallowing  · You have swelling in your face or neck  Contact your dentist if:   · You have a fever and chills  · You have trouble opening or closing your mouth  · You have swelling around your tooth  · You have questions or concerns about your condition or care  Medicines: You may  need any of the following:  · NSAIDs , such as ibuprofen, help decrease swelling, pain, and fever  This medicine is available with or without a doctor's order  NSAIDs can cause stomach bleeding or kidney problems in certain people  If you take blood thinner medicine, always ask if NSAIDs are safe for you  Always read the medicine label and follow directions  Do not give these medicines to children under 10months of age without direction from your child's healthcare provider  · Acetaminophen  decreases pain and fever  It is available without a doctor's order  Ask how much to take and how often to take it  Follow directions  Acetaminophen can cause liver damage if not taken correctly  · Prescription pain medicine  may be given  Ask your healthcare provider how to take this medicine safely  Some prescription pain medicines contain acetaminophen  Do not take other medicines that contain acetaminophen without talking to your healthcare provider   Too much acetaminophen may cause liver damage  Prescription pain medicine may cause constipation  Ask your healthcare provider how to prevent or treat constipation  · Antibiotics  help treat or prevent a bacterial infection  · Take your medicine as directed  Contact your healthcare provider if you think your medicine is not helping or if you have side effects  Tell him of her if you are allergic to any medicine  Keep a list of the medicines, vitamins, and herbs you take  Include the amounts, and when and why you take them  Bring the list or the pill bottles to follow-up visits  Carry your medicine list with you in case of an emergency  Self-care:   · Rinse your mouth with warm salt water  4 times a day or as directed  · Eat soft foods  to help relieve pain caused by chewing  · Apply ice on your jaw or cheek  for 15 to 20 minutes every hour or as directed  Use an ice pack, or put crushed ice in a plastic bag  Cover it with a towel before you apply it  Ice helps prevent tissue damage and decreases swelling and pain  Help prevent a toothache:   · Brush your teeth at least 2 times a day  · Use dental floss to clean between your teeth at least 1 time a day  · See your dentist regularly every 6 months for dental cleanings and oral exams  Follow up with your dentist as directed: You may be referred to a dental surgeon  Write down your questions so you remember to ask them during your visits  © Copyright [employer] 2022 Information is for End User's use only and may not be sold, redistributed or otherwise used for commercial purposes  All illustrations and images included in CareNotes® are the copyrighted property of [employer]  or [employer]ully Briones   The above information is an  only  It is not intended as medical advice for individual conditions or treatments  Talk to your doctor, nurse or pharmacist before following any medical regimen to see if it is safe and effective for you      Follow up with PCP in 3-5 days  Proceed to  ER if symptoms worsen  Chief Complaint     Chief Complaint   Patient presents with    Dental Pain     Patient relates started with left bottom toothache since Friday  Pain is radiating into left ear  Taking Tylenol and Motrin for pain  Denies fever  History of Present Illness       63-year-old female presents today for evaluation 5 days  She states that it has started as a dull ache on the left side of her face and has progressed to left lower molar  No fever  She called her dentist, they had no emergency appointments available and directed her here  She has been taking Tylenol and Motrin for pain without relief  She states she thinks that it is an infected root canal       Review of Systems   Review of Systems   Constitutional: Negative for chills, fatigue and fever  HENT: Negative for congestion, facial swelling, mouth sores, postnasal drip, sore throat and trouble swallowing  Eyes: Negative for visual disturbance  Respiratory: Negative for chest tightness and shortness of breath  Gastrointestinal: Negative for abdominal pain  Genitourinary: Negative for dysuria  Skin: Negative for rash  Allergic/Immunologic: Negative for immunocompromised state  Neurological: Negative for dizziness, light-headedness and headaches           Current Medications       Current Outpatient Medications:     Cetirizine HCl (ZYRTEC ALLERGY) 10 MG CAPS, Take 1 tablet by mouth daily at bedtime , Disp: , Rfl:     Cholecalciferol (Vitamin D3) 25 MCG (1000 UT) CAPS, Take 1 capsule (1,000 Units total) by mouth daily, Disp: , Rfl:     estradiol (MENOSTAR) 14 MCG/24HR, PLACE 1 PATCH ON THE SKIN ONCE WEEKLY, Disp: , Rfl:     fexofenadine-pseudoephedrine (ALLEGRA-D)  MG per tablet, Take 1 tablet by mouth daily , Disp: , Rfl:     fluticasone (FLONASE) 50 mcg/act nasal spray, 2 sprays into each nostril daily, Disp: , Rfl:     penicillin V potassium (VEETID) 500 mg tablet, Take 1 tablet (500 mg total) by mouth every 6 (six) hours for 10 days, Disp: 40 tablet, Rfl: 0    Current Allergies     Allergies as of 05/03/2022 - Reviewed 05/03/2022   Allergen Reaction Noted    Sulfa antibiotics  12/23/2016    Sulfasalazine  12/23/2016            The following portions of the patient's history were reviewed and updated as appropriate: allergies, current medications, past family history, past medical history, past social history, past surgical history and problem list      Past Medical History:   Diagnosis Date    Allergic rhinitis     Gets immunotherapy with Dr Anish Hernandez 3/8/13; RESOLVED 11/10/15       Past Surgical History:   Procedure Laterality Date    ABDOMINAL SURGERY      BLADDER SUSPENSION      BUNIONECTOMY Right     with implants    TOTAL ABDOMINAL HYSTERECTOMY  2006    WITH REMOVAL OF BOTH OVARIES        Family History   Problem Relation Age of Onset    Other Mother         MULTIPLE MYELOMA    Diabetes Father     Melanoma Father     Prostate cancer Father     Diabetes Brother     Hypertension Brother          Medications have been verified  Objective   /82   Pulse 86   Temp (!) 96 7 °F (35 9 °C) (Tympanic)   Resp 16   SpO2 95%        Physical Exam     Physical Exam  Vitals and nursing note reviewed  Constitutional:       Appearance: Normal appearance  She is not ill-appearing  HENT:      Head: Normocephalic and atraumatic  Jaw: There is normal jaw occlusion  Tenderness (Left posterior gingiva ) present  No trismus, swelling or pain on movement  Comments: No gingival erythema or fluctuance noted  No obvious dental caries visualized  Right Ear: Tympanic membrane, ear canal and external ear normal       Left Ear: Tympanic membrane, ear canal and external ear normal       Nose: Nose normal       Mouth/Throat:      Mouth: Mucous membranes are moist       Pharynx: No posterior oropharyngeal erythema        Comments: Airway intact  No stridor  No difficulty swallowing  No trismus  No drooling  Able to handle secretions without difficulty  Eyes:      Extraocular Movements: Extraocular movements intact  Pupils: Pupils are equal, round, and reactive to light  Cardiovascular:      Rate and Rhythm: Normal rate and regular rhythm  Pulses: Normal pulses  Pulmonary:      Effort: Pulmonary effort is normal       Breath sounds: Normal breath sounds  Neurological:      Mental Status: She is alert

## 2022-05-31 ENCOUNTER — CLINICAL SUPPORT (OUTPATIENT)
Dept: FAMILY MEDICINE CLINIC | Facility: CLINIC | Age: 62
End: 2022-05-31
Payer: COMMERCIAL

## 2022-05-31 DIAGNOSIS — Z03.818 ENCOUNTER FOR OBSERVATION FOR SUSPECTED EXPOSURE TO OTHER BIOLOGICAL AGENTS RULED OUT: Primary | ICD-10-CM

## 2022-05-31 LAB
SARS-COV-2 AG UPPER RESP QL IA: NEGATIVE
VALID CONTROL: NORMAL

## 2022-05-31 PROCEDURE — 87811 SARS-COV-2 COVID19 W/OPTIC: CPT

## 2022-05-31 NOTE — PROGRESS NOTES
Patient came in for a rapid covid test today which resulted negative  She declined to be seen  Pt advised to continue wearing mask and if symptoms worsen to give us a call

## 2022-10-13 ENCOUNTER — RA CDI HCC (OUTPATIENT)
Dept: OTHER | Facility: HOSPITAL | Age: 62
End: 2022-10-13

## 2022-10-13 NOTE — PROGRESS NOTES
[facility]       Chart reviewed, no opportunity found: CHART REVIEWED, NO OPPORTUNITY FOUND        Patients Insurance        Commercial Insurance: [address] [place] [address]

## 2022-10-20 ENCOUNTER — OFFICE VISIT (OUTPATIENT)
Dept: FAMILY MEDICINE CLINIC | Facility: CLINIC | Age: 62
End: 2022-10-20
Payer: COMMERCIAL

## 2022-10-20 VITALS
HEART RATE: 92 BPM | WEIGHT: 154 LBS | HEIGHT: 67 IN | BODY MASS INDEX: 24.17 KG/M2 | SYSTOLIC BLOOD PRESSURE: 144 MMHG | DIASTOLIC BLOOD PRESSURE: 82 MMHG | OXYGEN SATURATION: 98 %

## 2022-10-20 DIAGNOSIS — Z23 NEED FOR INFLUENZA VACCINATION: ICD-10-CM

## 2022-10-20 DIAGNOSIS — Z00.00 PHYSICAL EXAM: Primary | ICD-10-CM

## 2022-10-20 DIAGNOSIS — K22.2 ESOPHAGEAL STRICTURE: ICD-10-CM

## 2022-10-20 DIAGNOSIS — E78.2 MIXED HYPERLIPIDEMIA: ICD-10-CM

## 2022-10-20 DIAGNOSIS — J30.9 ALLERGIC RHINITIS, UNSPECIFIED SEASONALITY, UNSPECIFIED TRIGGER: ICD-10-CM

## 2022-10-20 PROBLEM — R32 URINARY INCONTINENCE: Status: ACTIVE | Noted: 2022-10-20

## 2022-10-20 PROCEDURE — 90682 RIV4 VACC RECOMBINANT DNA IM: CPT

## 2022-10-20 PROCEDURE — 90471 IMMUNIZATION ADMIN: CPT

## 2022-10-20 PROCEDURE — 99396 PREV VISIT EST AGE 40-64: CPT | Performed by: FAMILY MEDICINE

## 2022-10-20 NOTE — PROGRESS NOTES
FAMILY PRACTICE OFFICE VISIT    NAME: Hilary Goins    AGE: 63 y o  SEX: female  : 1960   MRN: [id number]    DATE: 10/20/2022  TIME: 4:13 PM    Assessment and Plan   1  Physical exam  Anticipatory guidance and preventative medicine discussed  Colonoscopy - see below  mammo up to date  Sees gyn - will discuss duration of hrt with their office  Goes to eye dr and dentist  Had a bone scan - at [facility] a couple years ago and normal as per pt  Taking calcium and vit D  Flu s hot today  Consider shingrix  2  Allergic rhinitis, unspecified seasonality, unspecified trigger  Stable with prn meds  3  Esophageal stricture  F/u with gi for both scopes  4   HL  Repeat labs   Patient to call for results if he/she does not hear from us    Patient Instructions   Recommend GI for upper and lower endoscopies  Flu shot today    Fasting labs  Patient to call for results if he/she does not hear from us    Consider shingrix  Return in 1 year              Chief Complaint     Chief Complaint   Patient presents with   • Physical Exam     Annual physical       History of Present Illness   Caleb De Los Santos is a 63y o -year-old female who presents today for annual PE      Review of Systems   Review of Systems   Constitutional: Negative for chills and fever  Exercises regularly  Pt feels healthy  Works on keeping weight in check  And aims for 10,000 steps/day     HENT: Negative for ear pain and sore throat  Abscess under root canal earlier this year with admission for IV antibiotics - now resolved  Eyes: Negative for pain and visual disturbance  Respiratory: Negative for cough and shortness of breath  Cardiovascular: Negative for chest pain and palpitations  Gastrointestinal: Negative for abdominal pain and vomiting  Genitourinary: Negative for dysuria and hematuria          Sees gyn   Has estrogen patch   For h/o hot flushes  Occasional incontinence  Sees urogyn   Musculoskeletal: Negative for arthralgias and back pain  Skin: Negative for color change and rash  Neurological: Negative for seizures and syncope  All other systems reviewed and are negative        Active Problem List     Patient Active Problem List   Diagnosis   • Allergic rhinitis   • Closed fracture of right inferior pubic ramus (HCC)   • Closed fracture of right superior pubic ramus (HCC)   • History of pelvic surgery   • Dermatitis   • Bilateral hearing loss   • Lower abdominal pain   • COVID-19 virus infection in vaccinated adult         Past Medical History:  Past Medical History:   Diagnosis Date   • Allergic rhinitis     Gets immunotherapy with Dr Merchant   • Vertigo     LAST ASSESSED 3/8/13; RESOLVED 11/10/15       Past Surgical History:  Past Surgical History:   Procedure Laterality Date   • ABDOMINAL SURGERY     • BLADDER SUSPENSION     • BUNIONECTOMY Right     with implants   • TOTAL ABDOMINAL HYSTERECTOMY  2006    WITH REMOVAL OF BOTH OVARIES        Family History:  Family History   Problem Relation Age of Onset   • Other Mother         MULTIPLE MYELOMA   • Diabetes Father    • Melanoma Father    • Prostate cancer Father    • Diabetes Brother    • Hypertension Brother        Social History:  Social History     Socioeconomic History   • Marital status: /Civil Union     Spouse name: Not on file   • Number of children: Not on file   • Years of education: Not on file   • Highest education level: Not on file   Occupational History   • Not on file   Tobacco Use   • Smoking status: Never Smoker   • Smokeless tobacco: Never Used   Vaping Use   • Vaping Use: Never used   Substance and Sexual Activity   • Alcohol use: Yes     Comment: occasional   • Drug use: No   • Sexual activity: Not Currently   Other Topics Concern   • Not on file   Social History Narrative    Daily caffeine use- 2 cups of iced tea, 2-3 cups of hot tea     Social Determinants of Health     Financial Resource Strain: Not on file   Food Insecurity: Not on file   Transportation Needs: Not on file   Physical Activity: Not on file   Stress: Not on file   Social Connections: Not on file   Intimate Partner Violence: Not on file   Housing Stability: Not on file       Objective     Vitals:    10/20/22 1500   BP: 144/82   Pulse: 92   SpO2: 98%     Wt Readings from Last 3 Encounters:   10/20/22 69 9 kg (154 lb)   10/20/21 69 7 kg (153 lb 9 6 oz)   05/12/21 67 6 kg (149 lb)       Physical Exam  Vitals and nursing note reviewed  Constitutional:       General: She is not in acute distress  Appearance: Normal appearance  She is not ill-appearing or toxic-appearing  HENT:      Head: Normocephalic  Right Ear: Tympanic membrane normal       Left Ear: Tympanic membrane normal       Nose: No congestion or rhinorrhea  Mouth/Throat:      Mouth: Mucous membranes are moist       Pharynx: Oropharynx is clear  No oropharyngeal exudate or posterior oropharyngeal erythema  Comments: Mucous membranes moist and pink  No tonsillar exudates    Eyes:      General: No scleral icterus  Conjunctiva/sclera: Conjunctivae normal       Pupils: Pupils are equal, round, and reactive to light  Neck:      Vascular: No carotid bruit  Cardiovascular:      Rate and Rhythm: Normal rate and regular rhythm  Pulses: Normal pulses  Heart sounds: Normal heart sounds  No murmur heard  Pulmonary:      Effort: Pulmonary effort is normal  No respiratory distress  Breath sounds: Normal breath sounds  No stridor  No wheezing, rhonchi or rales  Abdominal:      General: Abdomen is flat  There is no distension  Palpations: There is no mass  Tenderness: There is no abdominal tenderness  There is no guarding or rebound  Hernia: No hernia is present  Musculoskeletal:         General: No swelling, tenderness or deformity  Cervical back: Normal range of motion and neck supple  No rigidity  No muscular tenderness        Right lower leg: No edema  Left lower leg: No edema  Comments: No calf tenderness   Lymphadenopathy:      Cervical: No cervical adenopathy  Skin:     General: Skin is warm  Capillary Refill: Capillary refill takes less than 2 seconds  Coloration: Skin is not jaundiced or pale  Findings: No lesion or rash  Neurological:      General: No focal deficit present  Mental Status: She is alert and oriented to person, place, and time  Cranial Nerves: No cranial nerve deficit  Sensory: No sensory deficit  Motor: No weakness  Gait: Gait normal       Deep Tendon Reflexes: Reflexes normal    Psychiatric:         Mood and Affect: Mood normal          Behavior: Behavior normal          Thought Content:  Thought content normal          Judgment: Judgment normal          Pertinent Laboratory/Diagnostic Studies:  Lab Results   Component Value Date    GLUCOSE 88 11/10/2015    BUN 12 03/11/2022    CREATININE 0 78 03/11/2022    CALCIUM 9 1 11/10/2015     11/10/2015    K 4 2 03/11/2022    CO2 21 03/11/2022     03/11/2022     Lab Results   Component Value Date    ALT 14 03/11/2022    AST 20 03/11/2022    ALKPHOS 86 11/10/2015    BILITOT 0 37 11/10/2015       Lab Results   Component Value Date    WBC 4 0 03/11/2022    HGB 11 9 03/11/2022    HCT 34 1 03/11/2022    MCV 90 03/11/2022     03/11/2022       Lab Results   Component Value Date    TSH 1 020 03/11/2022       Lab Results   Component Value Date    CHOL 195 11/12/2015     Lab Results   Component Value Date    TRIG 59 03/11/2022     Lab Results   Component Value Date    HDL 79 03/11/2022     Lab Results   Component Value Date    LDLCALC 130 (H) 03/11/2022     Lab Results   Component Value Date    HGBA1C 5 6 03/11/2022       Results for orders placed or performed in visit on 05/31/22   POCT Rapid Covid Ag   Result Value Ref Range    POCT SARS-CoV-2 Ag Negative Negative    VALID CONTROL Valid        No orders of the defined types were placed in this encounter  ALLERGIES:  Allergies   Allergen Reactions   • Sulfa Antibiotics    • Sulfasalazine        Current Medications     Current Outpatient Medications   Medication Sig Dispense Refill   • Cetirizine HCl 10 MG CAPS Take 1 tablet by mouth daily at bedtime      • Cholecalciferol (Vitamin D3) 25 MCG (1000 UT) CAPS Take 1 capsule (1,000 Units total) by mouth daily     • estradiol (MENOSTAR) 14 MCG/24HR PLACE 1 PATCH ON THE SKIN ONCE WEEKLY     • fexofenadine-pseudoephedrine (ALLEGRA-D)  MG per tablet Take 1 tablet by mouth daily      • fluticasone (FLONASE) 50 mcg/act nasal spray 2 sprays into each nostril daily       No current facility-administered medications for this visit           Health Maintenance     Health Maintenance   Topic Date Due   • Osteoporosis Screening  Never done   • PT PLAN OF CARE  04/15/2021   • COVID-19 Vaccine (3 - Booster for Moderna series) 07/09/2021   • Influenza Vaccine (1) 09/01/2022   • Annual Physical  10/20/2022   • Breast Cancer Screening: Mammogram  08/23/2023   • Depression Screening  10/20/2023   • BMI: Adult  10/20/2023   • Colorectal Cancer Screening  02/21/2024   • DTaP,Tdap,and Td Vaccines (3 - Td or Tdap) 09/15/2030   • HIV Screening  Completed   • Hepatitis C Screening  Completed   • Pneumococcal Vaccine: Pediatrics (0 to 5 Years) and At-Risk Patients (6 to 59 Years)  Aged Out   • HIB Vaccine  Aged Out   • Hepatitis B Vaccine  Aged Out   • IPV Vaccine  Aged Out   • Hepatitis A Vaccine  Aged Out   • Meningococcal ACWY Vaccine  Aged Out   • HPV Vaccine  Aged [employer] History   Administered Date(s) Administered   • COVID-19 MODERNA VACC 0 5 ML IM 01/12/2021, 02/09/2021   • INFLUENZA 11/10/2015, 11/05/2016, 08/15/2017, 10/01/2019, 08/18/2020   • Influenza Quadrivalent, 6-35 Months IM 11/10/2015   • Influenza, injectable, quadrivalent, preservative free 0 5 mL 08/18/2020   • Influenza, recombinant, quadrivalent,injectable, preservative free 09/06/2018, 10/20/2021   • Influenza, seasonal, injectable 03/15/2011, 09/14/2011   • Tdap 03/15/2011, 09/15/2020       Depression Screening and Follow-up Plan: Patient was screened for depression during today's encounter  They screened negative with a PHQ-2 score of 0          Alicia Blanton

## 2022-10-20 NOTE — PATIENT INSTRUCTIONS
Recommend GI for upper and lower endoscopies  Flu shot today    Fasting labs  Patient to call for results if he/she does not hear from us    Consider shingrix      Return in 1 year

## 2022-11-23 ENCOUNTER — CONSULT (OUTPATIENT)
Dept: GASTROENTEROLOGY | Facility: CLINIC | Age: 62
End: 2022-11-23

## 2022-11-23 VITALS
SYSTOLIC BLOOD PRESSURE: 125 MMHG | WEIGHT: 153.6 LBS | DIASTOLIC BLOOD PRESSURE: 85 MMHG | HEART RATE: 85 BPM | BODY MASS INDEX: 24.11 KG/M2 | HEIGHT: 67 IN | OXYGEN SATURATION: 98 % | TEMPERATURE: 98 F

## 2022-11-23 DIAGNOSIS — R10.9 ABDOMINAL PAIN, UNSPECIFIED ABDOMINAL LOCATION: ICD-10-CM

## 2022-11-23 DIAGNOSIS — R13.10 DYSPHAGIA, UNSPECIFIED TYPE: Primary | ICD-10-CM

## 2022-11-23 DIAGNOSIS — K59.00 CONSTIPATION, UNSPECIFIED CONSTIPATION TYPE: ICD-10-CM

## 2022-11-23 DIAGNOSIS — Z12.11 SCREENING FOR COLON CANCER: ICD-10-CM

## 2022-11-23 NOTE — PROGRESS NOTES
[facility] - Outpatient Consultation  Meggan Vasquez 63 y o  female MRN: [id number]  Encounter: [id number]          ASSESSMENT AND PLAN:    Meggan Vasquez is a 63 y o  female with prior esophageal stricture status post dilation, hiatal hernia status post repair, who has noticed increased dysphagia to solids over the past year in particular  She also notes some constipation recently  Suspect chronic idiopathic constipation versus irritable bowel syndrome with constipation  Less likely dysmotility or pelvic dyssynergia although these are possible as well  She had a prior colonoscopy in 2014 with small hemorrhoids but no polyps  Blood work from 5/2022 found CMP with glucose 108 but otherwise normal, normal WBC, Hgb, Plts and normal lactate  Prior vitamin D low at 27 8  Prior Hgb A1c 5 6 and TSH normal      1  Dysphagia, unspecified type    2  Screening for colon cancer    3  Constipation, unspecified constipation type    4  Abdominal pain, unspecified abdominal location        Orders Placed This Encounter   Procedures   • Colonoscopy   • EGD     High fiber diet  Okay to continue prunes  Drink at least 8 cups of water per day  Continue to eat slowly  Try to minimize foods that require additional chewing (such as bread, meats, dry foods)  Can add miralax or senna as needed to help with constipation  Schedule endoscopy given prior esophageal stricture  Schedule repeat colonoscopy  ______________________________________________________________________    Referred by Dr Jennifer Burks for dysphagia    HPI:    Meggan Vasquez is a 63 y o  female who presents with complaint of dysphagia  She has been experiencing dysphagia  With rice she has pain  Dr Mercedes Hand previously stretched her esophagus, approximately 7-10 years ago  That gave her relief but it seems to be causing problems again  Also occurs with breads  It has been a slow progression but ongoing for a year   She gets it when hungry and maybe eats too quickly  It now occurs even when she eats slowly  She always thought it was rice, but now with other food items as well  Her oldest brother has the same issues  It can be painful when she has the dysphagia  If she will try to drink water it will also be painful in the moment  No heartburn currently (only when pregnant)  She tries to walk and keep her weight down  She had nausea but no vomiting  She thinks it was a migraine  Not common  Recently she is constipated, passing small rock like stools  It has been an issue  She takes 2 prunes a day  She thinks she might not be drinking enough water  She has some iced tea and hot tea  She has also been having sharp abdominal pain  She has IBS  Sometimes she has constipation and then a sharp pain  In the past week she had to pull over  She has not been on medication for it  She can feel as it is going through her system  She is trying to avoid nuts but no difference  No BRBPR, melena, abdominal pain, weight loss  She had a prior colonoscopy in 2014 with small hemorrhoids but no polyps  She had a prior EGD with hiatal hernia (s/p repair) and also prior dilation       Answers for HPI/ROS submitted by the patient on 11/22/2022  Chronicity: chronic  Onset: more than 1 month ago  Onset quality: sudden  Frequency: intermittently  Episode duration: 3 Hours  Progression since onset: unchanged  Pain location: periumbilical region, suprapubic region, left flank  Pain - numeric: 8/10  Pain quality: cramping, sharp, tearing  Radiates to: LLQ  anorexia: No  arthralgias: No  belching: No  constipation: Yes  diarrhea: No  dysuria: No  fever: No  flatus: No  frequency: No  headaches: Yes  hematochezia: No  hematuria: No  melena: No  myalgias: No  nausea: Yes  weight loss: No  vomiting: No  Aggravated by: nothing, bowel movement, eating  Relieved by: nothing        REVIEW OF SYSTEMS:  10 point ROS reviewed and negative, except as above      Historical Information   Past Medical History:   Diagnosis Date   • Allergic rhinitis     Gets immunotherapy with Dr Cinthia Echeverria   • Sheehan esophagus A long time ago    Have issues with eating rice and other foods   • Diabetes mellitus ([address] ) TESTING IN MAY 2019    FAMILY HISTORY   • Hernia 2019    Discovered after falling   • Irritable bowel syndrome 2/7/1999    Had a lot of stress at the time   • Vertigo     LAST ASSESSED 3/8/13; RESOLVED 11/10/15     Past Surgical History:   Procedure Laterality Date   • ABDOMINAL SURGERY     • BLADDER SUSPENSION     • BUNIONECTOMY Right     with implants   • COLONOSCOPY      Approx 10 years sfo Dr Claus Walker with IBS   • HERNIA REPAIR  2021    Had hernia repaired   • TOTAL ABDOMINAL HYSTERECTOMY  2006    WITH REMOVAL OF BOTH OVARIES    • UPPER GASTROINTESTINAL ENDOSCOPY      Approx 10 yrs ago Dr Jennifer Henry    Also had esophagus stretched     Social History   Social History     Substance and Sexual Activity   Alcohol Use Yes    Comment: occasional     Social History     Substance and Sexual Activity   Drug Use No     Social History     Tobacco Use   Smoking Status Never   Smokeless Tobacco Never     Family History   Problem Relation Age of Onset   • Other Mother         MULTIPLE MYELOMA   • Cancer Mother         Myeloma for 21 yrs   • Diabetes Father    • Melanoma Father    • Prostate cancer Father    • Diabetes Brother         Diabetes   • Hypertension Brother    • Diabetes Brother         Diabetes       Meds/Allergies       Current Outpatient Medications:   •  Cetirizine HCl 10 MG CAPS  •  Cholecalciferol (Vitamin D3) 25 MCG (1000 UT) CAPS  •  estradiol (MENOSTAR) 14 MCG/24HR  •  fexofenadine-pseudoephedrine (ALLEGRA-D)  MG per tablet  •  fluticasone (FLONASE) 50 mcg/act nasal spray    Allergies   Allergen Reactions   • Sulfa Antibiotics    • Sulfasalazine            Objective     Blood pressure 125/85, pulse 85, temperature 98 °F (36 7 °C), temperature source Tympanic, height 5' 6 5" (1 689 m), weight 69 7 kg (153 lb 9 6 oz), SpO2 98 %  Body mass index is 24 42 kg/m²  PHYSICAL EXAMINATION:    General Appearance:   Alert, cooperative, no distress   HEENT:  Normocephalic, atraumatic, anicteric  Neck supple, symmetrical, trachea midline  Lungs:   Equal chest rise and unlabored breathing, normal effort, no coughing  Cardiovascular:   No visualized JVD  Abdomen:   No abdominal distension  Skin:   No jaundice, rashes, or lesions  Musculoskeletal:   Normal range of motion visualized  Psych:  Normal affect and normal insight  Neuro:  Alert and appropriate  Lab Results:   No visits with results within 1 Day(s) from this visit  Latest known visit with results is:   Clinical Support on 05/31/2022   Component Date Value   • POCT SARS-CoV-2 Ag 05/31/2022 Negative    • VALID CONTROL 05/31/2022 Valid        Lab Results   Component Value Date    WBC 4 0 03/11/2022    HGB 11 9 03/11/2022    HCT 34 1 03/11/2022    MCV 90 03/11/2022     03/11/2022       Lab Results   Component Value Date     11/10/2015    SODIUM 144 03/11/2022    K 4 2 03/11/2022     03/11/2022    CO2 21 03/11/2022    ANIONGAP 7 11/10/2015    BUN 12 03/11/2022    CREATININE 0 78 03/11/2022    GLUC 96 03/11/2022    CALCIUM 9 1 11/10/2015    AST 20 03/11/2022    ALT 14 03/11/2022    ALKPHOS 86 11/10/2015    PROT 6 9 11/10/2015    TP 6 2 03/11/2022    BILITOT 0 37 11/10/2015    TBILI 0 3 03/11/2022    EGFR 87 03/11/2022       No results found for: CRP    Lab Results   Component Value Date    QQF3VRDTNWRH 1 415 11/10/2015    TSH 1 020 03/11/2022       No results found for: IRON, TIBC, FERRITIN    Radiology Results:   No results found

## 2023-01-16 ENCOUNTER — HOSPITAL ENCOUNTER (OUTPATIENT)
Dept: GASTROENTEROLOGY | Facility: MEDICAL CENTER | Age: 63
Setting detail: OUTPATIENT SURGERY
Discharge: HOME | End: 2023-01-16
Attending: INTERNAL MEDICINE

## 2023-01-16 ENCOUNTER — ANESTHESIA (OUTPATIENT)
Dept: GASTROENTEROLOGY | Facility: MEDICAL CENTER | Age: 63
End: 2023-01-16

## 2023-01-16 ENCOUNTER — ANESTHESIA EVENT (OUTPATIENT)
Dept: GASTROENTEROLOGY | Facility: MEDICAL CENTER | Age: 63
End: 2023-01-16

## 2023-01-16 VITALS
TEMPERATURE: 97.1 F | OXYGEN SATURATION: 99 % | RESPIRATION RATE: 18 BRPM | SYSTOLIC BLOOD PRESSURE: 107 MMHG | HEIGHT: 67 IN | DIASTOLIC BLOOD PRESSURE: 59 MMHG | WEIGHT: 146 LBS | BODY MASS INDEX: 22.91 KG/M2 | HEART RATE: 62 BPM

## 2023-01-16 DIAGNOSIS — R13.10 DYSPHAGIA, UNSPECIFIED TYPE: ICD-10-CM

## 2023-01-16 DIAGNOSIS — Z12.11 SCREENING FOR COLON CANCER: ICD-10-CM

## 2023-01-16 RX ORDER — PROPOFOL 10 MG/ML
INJECTION, EMULSION INTRAVENOUS AS NEEDED
Status: DISCONTINUED | OUTPATIENT
Start: 2023-01-16 | End: 2023-01-16

## 2023-01-16 RX ORDER — LIDOCAINE HYDROCHLORIDE 20 MG/ML
INJECTION, SOLUTION EPIDURAL; INFILTRATION; INTRACAUDAL; PERINEURAL AS NEEDED
Status: DISCONTINUED | OUTPATIENT
Start: 2023-01-16 | End: 2023-01-16

## 2023-01-16 RX ORDER — PROPOFOL 10 MG/ML
INJECTION, EMULSION INTRAVENOUS CONTINUOUS PRN
Status: DISCONTINUED | OUTPATIENT
Start: 2023-01-16 | End: 2023-01-16

## 2023-01-16 RX ORDER — SODIUM CHLORIDE 9 MG/ML
125 INJECTION, SOLUTION INTRAVENOUS CONTINUOUS
Status: DISCONTINUED | OUTPATIENT
Start: 2023-01-16 | End: 2023-01-20 | Stop reason: HOSPADM

## 2023-01-16 RX ADMIN — PROPOFOL 30 MG: 10 INJECTION, EMULSION INTRAVENOUS at 11:22

## 2023-01-16 RX ADMIN — SODIUM CHLORIDE 125 ML/HR: 0.9 INJECTION, SOLUTION INTRAVENOUS at 10:39

## 2023-01-16 RX ADMIN — LIDOCAINE HYDROCHLORIDE 50 MG: 20 INJECTION, SOLUTION EPIDURAL; INFILTRATION; INTRACAUDAL; PERINEURAL at 10:47

## 2023-01-16 RX ADMIN — PROPOFOL 140 MCG/KG/MIN: 10 INJECTION, EMULSION INTRAVENOUS at 10:49

## 2023-01-16 RX ADMIN — PROPOFOL 120 MG: 10 INJECTION, EMULSION INTRAVENOUS at 10:47

## 2023-01-16 NOTE — H&P
History and Physical -  [facility]  Petrona Rock 63 y o  female MRN: [id number]                  HPI: Petrona Rock is a 63y o  year old female who presents for dysphagia, pain, constipation, screening      REVIEW OF SYSTEMS: Per the HPI, and otherwise unremarkable  Historical Information   Past Medical History:   Diagnosis Date   • Allergic rhinitis     Gets immunotherapy with Dr Nick Pizano   • Sheehan esophagus A long time ago    Have issues with eating rice and other foods   • Diabetes mellitus ([address] ) TESTING IN MAY 2019    FAMILY HISTORY   • Hernia 2019    Discovered after falling   • Irritable bowel syndrome 2/7/1999    Had a lot of stress at the time   • Vertigo     LAST ASSESSED 3/8/13; RESOLVED 11/10/15     Past Surgical History:   Procedure Laterality Date   • ABDOMINAL SURGERY     • BLADDER SUSPENSION     • BUNIONECTOMY Right     with implants   • COLONOSCOPY      Approx 10 years sfo Dr rBit Lora with IBS   • HERNIA REPAIR  2021    Had hernia repaired   • TOTAL ABDOMINAL HYSTERECTOMY  2006    WITH REMOVAL OF BOTH OVARIES    • UPPER GASTROINTESTINAL ENDOSCOPY      Approx 10 yrs ago Dr Chacha Mo    Also had esophagus stretched     Social History   Social History     Substance and Sexual Activity   Alcohol Use Yes    Comment: occasional     Social History     Substance and Sexual Activity   Drug Use No     Social History     Tobacco Use   Smoking Status Never   Smokeless Tobacco Never     Family History   Problem Relation Age of Onset   • Other Mother         MULTIPLE MYELOMA   • Cancer Mother         Myeloma for 21 yrs   • Diabetes Father    • Melanoma Father    • Prostate cancer Father    • Diabetes Brother         Diabetes   • Hypertension Brother    • Diabetes Brother         Diabetes       Meds/Allergies     (Not in a hospital admission)      Allergies   Allergen Reactions   • Sulfa Antibiotics    • Sulfasalazine        Objective There were no vitals taken for this visit  PHYSICAL EXAMINATION:    General Appearance:   Alert, cooperative, no distress   HEENT:  Normocephalic, atraumatic, anicteric  Neck supple, symmetrical, trachea midline  Lungs:   Equal chest rise and unlabored breathing, normal effort, no coughing  Cardiovascular:   No visualized JVD  Abdomen:   No abdominal distension  Skin:   No jaundice, rashes, or lesions  Musculoskeletal:   Normal range of motion visualized  Psych:  Normal affect and normal insight  Neuro:  Alert and appropriate  ASSESSMENT/PLAN:  This is a 63y o  year old female here for EGD and colonoscopy, and she is stable and optimized for her procedure

## 2023-01-16 NOTE — ANESTHESIA POSTPROCEDURE EVALUATION
Post-Op Assessment Note    CV Status:  Stable    Pain management: adequate     Mental Status:  Alert and awake   Hydration Status:  Euvolemic   PONV Controlled:  Controlled   Airway Patency:  Patent      Post Op Vitals Reviewed: Yes      Staff: Anesthesiologist         No notable events documented      BP      Temp     Pulse     Resp      SpO2      /59   Pulse 62   Temp (!) 97 1 °F (36 2 °C) (Temporal)   Resp 18   Ht 5' 7" (1 702 m)   Wt 66 2 kg (146 lb)   SpO2 99%   BMI 22 87 kg/m²

## 2023-01-16 NOTE — ANESTHESIA PREPROCEDURE EVALUATION
Procedure:  COLONOSCOPY  EGD    Relevant Problems   CARDIO   (+) Mixed hyperlipidemia      Respiratory   (+) Allergic rhinitis      Digestive   (+) Esophageal stricture      Nervous and Auditory   (+) Bilateral hearing loss      Other   (+) Lower abdominal pain   (+) Urinary incontinence        Physical Exam    Airway    Mallampati score: III  TM Distance: >3 FB  Neck ROM: full     Dental   No notable dental hx     Cardiovascular  Rhythm: regular, Rate: normal, Cardiovascular exam normal    Pulmonary  Pulmonary exam normal     Other Findings        Anesthesia Plan  ASA Score- 2     Anesthesia Type- IV sedation with anesthesia with ASA Monitors  Additional Monitors:   Airway Plan:           Plan Factors-    Chart reviewed  Existing labs reviewed  Patient summary reviewed  Patient is not a current smoker  Patient instructed to abstain from smoking on day of procedure  Patient did not smoke on day of surgery  Induction- intravenous  Postoperative Plan-     Informed Consent- Anesthetic plan and risks discussed with patient

## 2023-01-23 ENCOUNTER — TELEPHONE (OUTPATIENT)
Dept: GASTROENTEROLOGY | Facility: CLINIC | Age: 63
End: 2023-01-23

## 2023-01-23 DIAGNOSIS — K20.90 ESOPHAGITIS: Primary | ICD-10-CM

## 2023-01-23 RX ORDER — OMEPRAZOLE 40 MG/1
40 CAPSULE, DELAYED RELEASE ORAL
Qty: 30 CAPSULE | Refills: 6 | Status: SHIPPED | OUTPATIENT
Start: 2023-01-23 | End: 2023-02-09

## 2023-01-23 NOTE — TELEPHONE ENCOUNTER
----- Message from Chelo Obrien sent at 1/23/2023  8:06 AM EST -----  Regarding: FW: Biopsy results  Contact: [phone]    ----- Message -----  From: Hersey Scheuermann"  Sent: 1/21/2023   9:50 PM EST  To: [facility]  Subject: Biopsy results                                   I just wanted to let you know my stomach pains are gone  I think time healed whatever the issue was     Take care,  Shante Rand

## 2023-02-09 DIAGNOSIS — K20.90 ESOPHAGITIS: Primary | ICD-10-CM

## 2023-02-09 RX ORDER — PANTOPRAZOLE SODIUM 20 MG/1
20 TABLET, DELAYED RELEASE ORAL
Qty: 30 TABLET | Refills: 2 | Status: SHIPPED | OUTPATIENT
Start: 2023-02-09

## 2023-03-03 ENCOUNTER — TELEPHONE (OUTPATIENT)
Dept: GASTROENTEROLOGY | Facility: CLINIC | Age: 63
End: 2023-03-03

## 2023-03-03 NOTE — TELEPHONE ENCOUNTER
----- Message from Paola Edmonds sent at 1/26/2023  1:49 PM EST -----  Regarding: 3 months EGD recall     To:       Please 3 months EGD recall for Dr Kiley Ryan

## 2023-05-31 NOTE — PROGRESS NOTES
[facility] - Outpatient Follow-up Note  Giovanna Morel 66 y o  female MRN: [id number]  Encounter: [id number]          ASSESSMENT AND PLAN:    Giovanna Morel is a 66 y o  female with prior esophageal stricture status post dilation, hiatal hernia status postrepair who presents for follow-up after she was last seen in November in the office for dysphagia as well as constipation  Overall feeling much better on protonix  Endoscopy and colonoscopy from January 2023 with moderate rings in the esophagus as well as some localized esophagitis at the GE junction, 2 cm hiatal hernia, erythematous nodular mucosa in the antrum, 2 colon polyps removed and pancolonic diverticula  Biopsies with reactive gastropathy, chronic inflammation in the esophagus with some intraepithelial eosinophils up to 40 per high-power field, as well as 2 tubular adenomas  Recent CMP normal  Prior labs with Vitamin D 27 8,  TSH normal,  CBC normal     1  Esophagitis    2  Dysphagia, unspecified type    3  Abdominal pain, unspecified abdominal location        Orders Placed This Encounter   Procedures   • EGD     Continue pantoprazole  Schedule repeat endoscopy  Continue prunes  Drink at least 8 cups of water per day  MiraLAX or senna as needed to help with constipation  Vitamin D supplementation  Next colonoscopy in 2030      ______________________________________________________________________    SUBJECTIVE:    Giovanna Morel is a 66 y o  female who presents with complaint of reflux  She is not having as much trouble with the swallowing or the abdominal pain  She never has acid reflux  She has a history of allergies  No asthma or eczema  No breathing problems  She had an oak tree allergy  Always had sinus infections in the past  She has been on Allegra and zyrtec and allergy shots  She can tell when she needs to go early         Answers for HPI/ROS submitted by the patient on 5/31/2023  Onset: more than 1 month ago  Onset quality: sudden  Frequency: rarely  Episode duration: 1 Hours  Progression since onset: resolved  Pain - numeric: 0/10  anorexia: No  arthralgias: No  belching: No  constipation: No  diarrhea: No  dysuria: No  fever: No  flatus: No  frequency: No  headaches: Yes  hematochezia: No  hematuria: No  melena: No  myalgias: No  nausea: No  weight loss: No  vomiting: No  Aggravated by: nothing  Relieved by: nothing          REVIEW OF SYSTEMS IS OTHERWISE NEGATIVE  10 point ROS reviewed and negative, except as above      Historical Information   Past Medical History:   Diagnosis Date   • Allergic rhinitis     Gets immunotherapy with Dr Merchant   • Sheehan esophagus A long time ago    Have issues with eating rice and other foods   • Diabetes mellitus ([facility] [address] ) TESTING IN MAY 2019    FAMILY HISTORY   • Hernia 2019    Discovered after falling   • Irritable bowel syndrome 2/7/1999    Had a lot of stress at the time   • Vertigo     LAST ASSESSED 3/8/13; RESOLVED 11/10/15     Past Surgical History:   Procedure Laterality Date   • ABDOMINAL SURGERY     • BLADDER SUSPENSION     • BUNIONECTOMY Right     with implants   • COLONOSCOPY      Approx 10 years sfo Dr Nehal Connelly   • EGD     • [address] [place] [address] with IBS   • HERNIA REPAIR  2021    Had hernia repaired   • TOTAL ABDOMINAL HYSTERECTOMY  2006    WITH REMOVAL OF BOTH OVARIES    • UPPER GASTROINTESTINAL ENDOSCOPY      Approx 10 yrs ago Dr Nehal Connelly    Also had esophagus stretched     Social History   Social History     Substance and Sexual Activity   Alcohol Use Yes    Comment: occasional     Social History     Substance and Sexual Activity   Drug Use No     Social History     Tobacco Use   Smoking Status Never   Smokeless Tobacco Never     Family History   Problem Relation Age of Onset   • Other Mother         MULTIPLE MYELOMA   • Cancer Mother         Myeloma for 21 yrs   • Diabetes Father    • Melanoma Father    • Prostate cancer Father    • Diabetes "Brother         Diabetes   • Hypertension Brother    • Diabetes Brother         Diabetes       Meds/Allergies       Current Outpatient Medications:   •  Cetirizine HCl 10 MG CAPS  •  Cholecalciferol (Vitamin D3) 25 MCG (1000 UT) CAPS  •  estradiol (MENOSTAR) 14 MCG/24HR  •  fexofenadine-pseudoephedrine (Allegra-D Allergy & Congestion)  MG per tablet  •  fluticasone (FLONASE) 50 mcg/act nasal spray  •  Melatonin 300 MCG TABS  •  Multiple Vitamin (MULTIVITAMIN ADULT PO)  •  pantoprazole (PROTONIX) 20 mg tablet  •  Vitamin D, Cholecalciferol, 50 MCG (2000 UT) CAPS  •  Cetirizine HCl (ZyrTEC Allergy) 10 MG CAPS  •  estradiol (Menostar) 14 MCG/24HR  •  fexofenadine-pseudoephedrine (ALLEGRA-D)  MG per tablet    Allergies   Allergen Reactions   • Sulfa Antibiotics Other (See Comments)     migraines   • Sulfasalazine Other (See Comments)     migraines           Objective     Blood pressure 133/84, pulse 88, temperature 97 6 °F (36 4 °C), temperature source Tympanic, height 5' 7\" (1 702 m), weight 71 2 kg (157 lb), SpO2 99 %  Body mass index is 24 59 kg/m²  PHYSICAL EXAMINATION:    General Appearance:   Alert, cooperative, no distress   HEENT:  Normocephalic, atraumatic, anicteric  Neck supple, symmetrical, trachea midline  Lungs:   Equal chest rise and unlabored breathing, normal effort, no coughing  Cardiovascular:   No visualized JVD  Abdomen:   No abdominal distension  Skin:   No jaundice, rashes, or lesions  Musculoskeletal:   Normal range of motion visualized  Psych:  Normal affect and normal insight  Neuro:  Alert and appropriate  Lab Results:   No visits with results within 1 Day(s) from this visit     Latest known visit with results is:   Hospital Outpatient Visit on 01/16/2023   Component Date Value   • Case Report 01/16/2023                      Value:Surgical Pathology Report                         Case: [id number]                                   Authorizing Provider:  " Gino Dial MD    Collected:           01/16/2023 1048              Ordering Location:     [facility]        Received:            01/16/2023 [address] [facility]                                                     Pathologist:           Patt Canchola MD                                                                 Specimens:   A) - Stomach, Gastric bx r/o H pylori                                                               B) - Esophagogastric junction, EG junction r/o Esophagitis                                          C) - Esophagus, Random esophagus bx  r/o EOE                                                        D) - Polyp, Colorectal, sigmoid colon polyp cold snare                                                                        E) - Polyp, Colorectal, Transverse colon polyp                                            • Final Diagnosis 01/16/2023                      Value: This result contains rich text formatting which cannot be displayed here  • Additional Information 01/16/2023                      Value: This result contains rich text formatting which cannot be displayed here  • Synoptic Checklist 01/16/2023                      Value:                            COLON/RECTUM POLYP FORM - GI - All Specimens                                                                                     :    Adenoma(s)     • Gross Description 01/16/2023                      Value: This result contains rich text formatting which cannot be displayed here         Lab Results   Component Value Date    HCT 34 1 03/11/2022    HGB 11 9 03/11/2022    MCV 90 03/11/2022     03/11/2022    WBC 4 0 03/11/2022       Lab Results   Component Value Date    ALKPHOS 86 11/10/2015    ALT 14 03/11/2022    ANIONGAP 7 11/10/2015    AST 20 03/11/2022    BILITOT 0 37 "11/10/2015    BUN 12 03/11/2022    CALCIUM 9 1 11/10/2015     03/11/2022    CO2 21 03/11/2022    CREATININE 0 78 03/11/2022    EGFR 87 03/11/2022    GLUC 96 03/11/2022    K 4 2 03/11/2022     11/10/2015    PROT 6 9 11/10/2015    SODIUM 144 03/11/2022    TBILI 0 3 03/11/2022    TP 6 2 03/11/2022       No results found for: \"CRP\"    Lab Results   Component Value Date    TSH 1 020 03/11/2022    [id number] 1 415 11/10/2015       No results found for: \"FERRITIN\", \"IRON\", \"TIBC\"    Radiology Results:   No results found    "

## 2023-06-01 ENCOUNTER — OFFICE VISIT (OUTPATIENT)
Dept: GASTROENTEROLOGY | Facility: CLINIC | Age: 63
End: 2023-06-01

## 2023-06-01 VITALS
SYSTOLIC BLOOD PRESSURE: 133 MMHG | HEIGHT: 67 IN | TEMPERATURE: 97.6 F | DIASTOLIC BLOOD PRESSURE: 84 MMHG | BODY MASS INDEX: 24.64 KG/M2 | HEART RATE: 88 BPM | WEIGHT: 157 LBS | OXYGEN SATURATION: 99 %

## 2023-06-01 DIAGNOSIS — R10.9 ABDOMINAL PAIN, UNSPECIFIED ABDOMINAL LOCATION: ICD-10-CM

## 2023-06-01 DIAGNOSIS — R13.10 DYSPHAGIA, UNSPECIFIED TYPE: ICD-10-CM

## 2023-06-01 DIAGNOSIS — K20.90 ESOPHAGITIS: Primary | ICD-10-CM

## 2023-06-01 RX ORDER — ASCORBIC ACID 500 MG
TABLET ORAL
COMMUNITY

## 2023-06-01 RX ORDER — MULTIVIT-MIN/IRON/FOLIC ACID/K 18-600-40
CAPSULE ORAL
COMMUNITY

## 2023-06-01 RX ORDER — ESTRADIOL 14 UG/D
PATCH TRANSDERMAL
COMMUNITY

## 2023-06-01 RX ORDER — FEXOFENADINE HCL AND PSEUDOEPHEDRINE HCI 60; 120 MG/1; MG/1
TABLET, EXTENDED RELEASE ORAL
COMMUNITY

## 2023-06-01 RX ORDER — CETIRIZINE HYDROCHLORIDE 10 MG/1
CAPSULE, LIQUID FILLED ORAL
COMMUNITY

## 2023-06-01 NOTE — PATIENT INSTRUCTIONS
Scheduled date of EGD (as of today) 8/1/2023  Physician performing: Dr Bebe Guthrie  Location of procedure: [place]  Instructions given to patient: EGD  Clearances:  N A

## 2023-07-28 ENCOUNTER — TELEPHONE (OUTPATIENT)
Dept: GASTROENTEROLOGY | Facility: CLINIC | Age: 63
End: 2023-07-28

## 2023-07-30 DIAGNOSIS — K20.90 ESOPHAGITIS: ICD-10-CM

## 2023-07-31 RX ORDER — PANTOPRAZOLE SODIUM 20 MG/1
20 TABLET, DELAYED RELEASE ORAL
Qty: 30 TABLET | Refills: 2 | Status: SHIPPED | OUTPATIENT
Start: 2023-07-31

## 2023-08-01 RX ORDER — SODIUM CHLORIDE 9 MG/ML
125 INJECTION, SOLUTION INTRAVENOUS CONTINUOUS
Status: CANCELLED | OUTPATIENT
Start: 2023-08-01

## 2023-08-02 ENCOUNTER — ANESTHESIA EVENT (OUTPATIENT)
Dept: ANESTHESIOLOGY | Facility: HOSPITAL | Age: 63
End: 2023-08-02

## 2023-08-02 ENCOUNTER — ANESTHESIA (OUTPATIENT)
Dept: ANESTHESIOLOGY | Facility: HOSPITAL | Age: 63
End: 2023-08-02

## 2023-08-02 NOTE — ANESTHESIA PREPROCEDURE EVALUATION
Procedure:  PRE-OP ONLY    Relevant Problems   ANESTHESIA (within normal limits)      CARDIO   (+) Mixed hyperlipidemia      ENDO (within normal limits)      GI/HEPATIC (within normal limits)      /RENAL (within normal limits)      GYN (within normal limits)      HEMATOLOGY (within normal limits)      MUSCULOSKELETAL (within normal limits)      NEURO/PSYCH (within normal limits)      PULMONARY (within normal limits)             Anesthesia Plan  ASA Score- 2     Anesthesia Type- IV sedation with anesthesia with ASA Monitors. Additional Monitors:   Airway Plan:           Plan Factors-Exercise tolerance (METS): >4 METS. Chart reviewed. Patient summary reviewed. Patient is not a current smoker. Induction- intravenous. Postoperative Plan-     Informed Consent- Anesthetic plan and risks discussed with patient.

## 2023-08-03 ENCOUNTER — HOSPITAL ENCOUNTER (OUTPATIENT)
Dept: GASTROENTEROLOGY | Facility: MEDICAL CENTER | Age: 63
Setting detail: OUTPATIENT SURGERY
End: 2023-08-03
Attending: INTERNAL MEDICINE
Payer: COMMERCIAL

## 2023-08-03 ENCOUNTER — ANESTHESIA (OUTPATIENT)
Dept: GASTROENTEROLOGY | Facility: MEDICAL CENTER | Age: 63
End: 2023-08-03

## 2023-08-03 ENCOUNTER — ANESTHESIA EVENT (OUTPATIENT)
Dept: GASTROENTEROLOGY | Facility: MEDICAL CENTER | Age: 63
End: 2023-08-03

## 2023-08-03 VITALS
TEMPERATURE: 98.5 F | HEIGHT: 67 IN | RESPIRATION RATE: 16 BRPM | SYSTOLIC BLOOD PRESSURE: 114 MMHG | HEART RATE: 74 BPM | DIASTOLIC BLOOD PRESSURE: 67 MMHG | WEIGHT: 157 LBS | BODY MASS INDEX: 24.64 KG/M2 | OXYGEN SATURATION: 96 %

## 2023-08-03 DIAGNOSIS — K20.90 ESOPHAGITIS: ICD-10-CM

## 2023-08-03 DIAGNOSIS — R13.10 DYSPHAGIA, UNSPECIFIED TYPE: ICD-10-CM

## 2023-08-03 DIAGNOSIS — R10.9 ABDOMINAL PAIN, UNSPECIFIED ABDOMINAL LOCATION: ICD-10-CM

## 2023-08-03 PROCEDURE — 88342 IMHCHEM/IMCYTCHM 1ST ANTB: CPT | Performed by: STUDENT IN AN ORGANIZED HEALTH CARE EDUCATION/TRAINING PROGRAM

## 2023-08-03 PROCEDURE — 43239 EGD BIOPSY SINGLE/MULTIPLE: CPT | Performed by: INTERNAL MEDICINE

## 2023-08-03 PROCEDURE — 88305 TISSUE EXAM BY PATHOLOGIST: CPT | Performed by: STUDENT IN AN ORGANIZED HEALTH CARE EDUCATION/TRAINING PROGRAM

## 2023-08-03 RX ORDER — LIDOCAINE HYDROCHLORIDE 20 MG/ML
INJECTION, SOLUTION EPIDURAL; INFILTRATION; INTRACAUDAL; PERINEURAL AS NEEDED
Status: DISCONTINUED | OUTPATIENT
Start: 2023-08-03 | End: 2023-08-03

## 2023-08-03 RX ORDER — SODIUM CHLORIDE 9 MG/ML
125 INJECTION, SOLUTION INTRAVENOUS CONTINUOUS
Status: DISCONTINUED | OUTPATIENT
Start: 2023-08-03 | End: 2023-08-07 | Stop reason: HOSPADM

## 2023-08-03 RX ORDER — PROPOFOL 10 MG/ML
INJECTION, EMULSION INTRAVENOUS AS NEEDED
Status: DISCONTINUED | OUTPATIENT
Start: 2023-08-03 | End: 2023-08-03

## 2023-08-03 RX ADMIN — SODIUM CHLORIDE 125 ML/HR: 0.9 INJECTION, SOLUTION INTRAVENOUS at 10:39

## 2023-08-03 RX ADMIN — LIDOCAINE HYDROCHLORIDE 100 MG: 20 INJECTION, SOLUTION EPIDURAL; INFILTRATION; INTRACAUDAL at 10:47

## 2023-08-03 RX ADMIN — PROPOFOL 150 MG: 10 INJECTION, EMULSION INTRAVENOUS at 10:47

## 2023-08-03 NOTE — ANESTHESIA POSTPROCEDURE EVALUATION
Post-Op Assessment Note    CV Status:  Stable    Pain management: adequate     Mental Status:  Alert and awake   Hydration Status:  Euvolemic   PONV Controlled:  Controlled   Airway Patency:  Patent      Post Op Vitals Reviewed: Yes      Staff: Anesthesiologist         No notable events documented.     BP      Temp      Pulse     Resp      SpO2      /67   Pulse 74   Temp 98.5 °F (36.9 °C) (Temporal)   Resp 16   Ht 5' 7" (1.702 m)   Wt 71.2 kg (157 lb)   SpO2 96%   BMI 24.59 kg/m²

## 2023-08-03 NOTE — H&P
History and Physical -  [facility]  Hill Mcdonald 66 y.o. female MRN: [id number]                  HPI: Hill Mcdonald is a 66y.o. year old female who presents for eosinophils      REVIEW OF SYSTEMS: Per the HPI, and otherwise unremarkable. Historical Information   Past Medical History:   Diagnosis Date   • Allergic rhinitis     Gets immunotherapy with Dr. Guillermo Scherer   • Sheehan esophagus A long time ago    Have issues with eating rice and other foods   • Diabetes mellitus ([address]) TESTING IN MAY 2019    FAMILY HISTORY   • Hernia 2019    Discovered after falling   • Irritable bowel syndrome 2/7/1999    Had a lot of stress at the time   • Vertigo     LAST ASSESSED 3/8/13; RESOLVED 11/10/15     Past Surgical History:   Procedure Laterality Date   • ABDOMINAL SURGERY     • BLADDER SUSPENSION     • BUNIONECTOMY Right     with implants   • COLONOSCOPY      Approx 10 years sfo Dr Amelia Gonzales   • EGD     • [address] [facility] with IBS   • HERNIA REPAIR  2021    Had hernia repaired   • TOTAL ABDOMINAL HYSTERECTOMY  2006    WITH REMOVAL OF BOTH OVARIES    • UPPER GASTROINTESTINAL ENDOSCOPY      Approx 10 yrs ago Dr Amelia Gonzales.   Also had esophagus stretched     Social History   Social History     Substance and Sexual Activity   Alcohol Use Yes    Comment: occasional     Social History     Substance and Sexual Activity   Drug Use No     Social History     Tobacco Use   Smoking Status Never   Smokeless Tobacco Never     Family History   Problem Relation Age of Onset   • Other Mother         MULTIPLE MYELOMA   • Cancer Mother         Myeloma for 21 yrs   • Diabetes Father    • Melanoma Father    • Prostate cancer Father    • Diabetes Brother         Diabetes   • Hypertension Brother    • Diabetes Brother         Diabetes       Meds/Allergies     (Not in a hospital admission)      Allergies   Allergen Reactions   • Sulfa Antibiotics Other (See Comments)     migraines   • Sulfasalazine Other (See Comments)     migraines       Objective     There were no vitals taken for this visit. PHYSICAL EXAMINATION:    General Appearance:   Alert, cooperative, no distress   HEENT:  Normocephalic, atraumatic, anicteric. Neck supple, symmetrical, trachea midline. Lungs:   Equal chest rise and unlabored breathing, normal effort, no coughing. Cardiovascular:   No visualized JVD. Abdomen:   No abdominal distension. Skin:   No jaundice, rashes, or lesions. Musculoskeletal:   Normal range of motion visualized. Psych:  Normal affect and normal insight. Neuro:  Alert and appropriate. ASSESSMENT/PLAN:  This is a 66y.o. year old female here for EGD, and she is stable and optimized for her procedure.

## 2023-08-03 NOTE — ANESTHESIA PREPROCEDURE EVALUATION
Procedure:  EGD    Relevant Problems   ANESTHESIA (within normal limits)      CARDIO   (+) Mixed hyperlipidemia      ENDO (within normal limits)      GI/HEPATIC (within normal limits)      /RENAL (within normal limits)      GYN (within normal limits)      HEMATOLOGY (within normal limits)      MUSCULOSKELETAL (within normal limits)      NEURO/PSYCH (within normal limits)      PULMONARY (within normal limits)        Physical Exam    Airway    Mallampati score: III  TM Distance: >3 FB  Neck ROM: full     Dental   No notable dental hx     Cardiovascular  Rhythm: regular, Rate: normal, Cardiovascular exam normal    Pulmonary  Pulmonary exam normal Breath sounds clear to auscultation,     Other Findings        Anesthesia Plan  ASA Score- 2     Anesthesia Type- IV sedation with anesthesia with ASA Monitors. Additional Monitors:   Airway Plan:           Plan Factors-Exercise tolerance (METS): >4 METS. Chart reviewed. Patient summary reviewed. Patient is not a current smoker. Induction- intravenous. Postoperative Plan-     Informed Consent- Anesthetic plan and risks discussed with patient.

## 2023-08-08 PROCEDURE — 88342 IMHCHEM/IMCYTCHM 1ST ANTB: CPT | Performed by: STUDENT IN AN ORGANIZED HEALTH CARE EDUCATION/TRAINING PROGRAM

## 2023-08-08 PROCEDURE — 88305 TISSUE EXAM BY PATHOLOGIST: CPT | Performed by: STUDENT IN AN ORGANIZED HEALTH CARE EDUCATION/TRAINING PROGRAM

## 2023-09-24 RX ORDER — ESTRADIOL 14 UG/D
PATCH TRANSDERMAL
Refills: 0 | Status: CANCELLED | OUTPATIENT
Start: 2023-09-24

## 2023-09-25 NOTE — TELEPHONE ENCOUNTER
Please call pt  I have received a refill request for her estrogen  However, this was filled in the past by another provider and does not appear to have been filled since 2017  Please ask pt if she sees gyn - she should reach out to their office re: refill request  Thanks

## 2023-10-18 ENCOUNTER — RA CDI HCC (OUTPATIENT)
Dept: OTHER | Facility: HOSPITAL | Age: 63
End: 2023-10-18

## 2023-10-18 NOTE — PROGRESS NOTES
[address] [facility] coding opportunities       Chart reviewed, no opportunity found: CHART REVIEWED, NO OPPORTUNITY FOUND        Patients Insurance        Commercial Insurance: [employer] Seg[employer]

## 2023-10-25 ENCOUNTER — OFFICE VISIT (OUTPATIENT)
Dept: FAMILY MEDICINE CLINIC | Facility: CLINIC | Age: 63
End: 2023-10-25
Payer: COMMERCIAL

## 2023-10-25 VITALS
BODY MASS INDEX: 25.27 KG/M2 | HEIGHT: 67 IN | HEART RATE: 55 BPM | DIASTOLIC BLOOD PRESSURE: 72 MMHG | SYSTOLIC BLOOD PRESSURE: 100 MMHG | WEIGHT: 161 LBS | OXYGEN SATURATION: 98 %

## 2023-10-25 DIAGNOSIS — R23.3 ABNORMAL BRUISING: ICD-10-CM

## 2023-10-25 DIAGNOSIS — M79.605 PAIN IN BOTH LOWER EXTREMITIES: ICD-10-CM

## 2023-10-25 DIAGNOSIS — E78.2 MIXED HYPERLIPIDEMIA: ICD-10-CM

## 2023-10-25 DIAGNOSIS — Z23 ENCOUNTER FOR IMMUNIZATION: Primary | ICD-10-CM

## 2023-10-25 DIAGNOSIS — M79.604 PAIN IN BOTH LOWER EXTREMITIES: ICD-10-CM

## 2023-10-25 PROCEDURE — 99214 OFFICE O/P EST MOD 30 MIN: CPT | Performed by: FAMILY MEDICINE

## 2023-10-25 PROCEDURE — 90471 IMMUNIZATION ADMIN: CPT | Performed by: FAMILY MEDICINE

## 2023-10-25 PROCEDURE — 90686 IIV4 VACC NO PRSV 0.5 ML IM: CPT | Performed by: FAMILY MEDICINE

## 2023-10-25 NOTE — PROGRESS NOTES
FAMILY PRACTICE OFFICE VISIT    NAME: Nuno Goins    AGE: 66 y.o. SEX: female  : 1960   MRN: [id number]    DATE: 10/25/2023  TIME: 5:22 PM    Assessment and Plan     1. Encounter for immunization  Flu shot today    - influenza vaccine, quadrivalent, 0.5 mL, preservative-free, for adult and pediatric patients 6 mos+ (AFLURIA, FLUARIX, FLULAVAL, FLUZONE)    2. Abnormal bruising  Send for labs  Then return visit to discuss and do PE  - CBC and differential; Future  - Iron, TIBC and Ferritin Panel; Future  - Protime-INR; Future  - APTT; Future    Avoid blood thinners  Ie: ASA or nsaids. , excedrin migraine. 3. Pain in both lower extremities  Check cpk  Not on statin  Cannot r/o PMR  Will check ESR and CRP    - Magnesium; Future  - TSH, 3rd generation; Future  - Iron, TIBC and Ferritin Panel; Future  - CK; Future    4. Mixed hyperlipidemia  Fasting labs    - Lipid panel; Future  - Comprehensive metabolic panel; Future      Chief Complaint     Chief Complaint   Patient presents with    Physical Exam    Leg Pain     Bilateral thigh pain. Patient reports walking 2-4 miles 2-3x weekly but does not feel it is related to this as she walks mostly on flat land. Bleeding/Bruising     Patient c/o waking up with two black eyes 2 months ago. Last week, she noticed bruising on the top of her hand. History of Present Illness   Myrtle Lopez is a 66y.o.-year-old female who presents today initially for PE but has some acute concerns. Woke with 2 black eyes  X 1-2 mos ago - no trauma or fall or eye surgery ; wears glasses    2. A couple weeks later - pt's dorsum left hand bruised - no known trauma  3.  2-3 weeks - developed anterior thigh pain with walking up the stairs. Mother had multiple myeloma            Review of Systems   Review of Systems   Constitutional:  Negative for chills, diaphoresis, fatigue, fever and unexpected weight change. HENT:  Positive for nosebleeds.          Nose bleeds once monthly - not new  Has had nostril cauterized in past.  Pt with allergies. No bleeding gums. Eyes:  Negative for redness. Respiratory:  Negative for cough, shortness of breath and wheezing. Cardiovascular:  Negative for chest pain and palpitations. Gastrointestinal:  Negative for blood in stool. Genitourinary:  Negative for hematuria. Musculoskeletal:         Anterior thigh pain  Noticing when going up the stairs   No weakness in upper ext's  Walks for exercise 2-3x/week - 2-4 miles     Allergic/Immunologic: Positive for environmental allergies. Neurological:  Positive for headaches. Had a migraine X 3 days last week. Hematological:  Negative for adenopathy. Bruises/bleeds easily.        Active Problem List     Patient Active Problem List   Diagnosis    Allergic rhinitis    Closed fracture of right inferior pubic ramus (HCC)    Closed fracture of right superior pubic ramus (HCC)    History of pelvic surgery    Dermatitis    Bilateral hearing loss    Lower abdominal pain    COVID-19 virus infection in vaccinated adult    Esophageal stricture    Mixed hyperlipidemia    Urinary incontinence         Past Medical History:  Past Medical History:   Diagnosis Date    Allergic rhinitis     Gets immunotherapy with Dr. Cristian Sadler esophagus A long time ago    Have issues with eating rice and other foods    Colon polyp     Diabetes mellitus ([address]) TESTING IN MAY 2019    FAMILY HISTORY    Dysphagia     GERD (gastroesophageal reflux disease)     Hernia 2019    Discovered after falling    Irritable bowel syndrome 2/7/1999    Had a lot of stress at the time    Vertigo     LAST ASSESSED 3/8/13; RESOLVED 11/10/15       Past Surgical History:  Past Surgical History:   Procedure Laterality Date    ABDOMINAL SURGERY      BLADDER SUSPENSION      BUNIONECTOMY Right     with implants    COLONOSCOPY      Approx 10 years sfo Dr Karlo Wood with IBS    HERNIA REPAIR  2021    Had hernia repaired    TOTAL ABDOMINAL HYSTERECTOMY  2006    WITH REMOVAL OF BOTH OVARIES     UPPER GASTROINTESTINAL ENDOSCOPY      Approx 10 yrs ago Dr Bonita Jaquez. Also had esophagus stretched       Family History:  Family History   Problem Relation Age of Onset    Other Mother         MULTIPLE MYELOMA    Cancer Mother         Myeloma for 21 yrs    Diabetes Father     Melanoma Father     Prostate cancer Father     Diabetes Brother         Diabetes    Hypertension Brother     Diabetes Brother         Diabetes       Social History:  Social History     Socioeconomic History    Marital status: /Civil Union     Spouse name: Not on file    Number of children: Not on file    Years of education: Not on file    Highest education level: Not on file   Occupational History    Not on file   Tobacco Use    Smoking status: Never    Smokeless tobacco: Never   Vaping Use    Vaping Use: Never used   Substance and Sexual Activity    Alcohol use: Yes     Comment: occasional    Drug use: No    Sexual activity: Not Currently     Partners: Male     Birth control/protection: Surgical     Comment: Hysterectomy   Other Topics Concern    Not on file   Social History Narrative    Daily caffeine use- 2 cups of iced tea, 2-3 cups of hot tea     Social Determinants of Health     Financial Resource Strain: Not on file   Food Insecurity: Not on file   Transportation Needs: Not on file   Physical Activity: Not on file   Stress: Not on file   Social Connections: Not on file   Intimate Partner Violence: Not on file   Housing Stability: Not on file       Objective     Vitals:    10/25/23 1712   BP: 100/72   Pulse: 55   SpO2: 98%     Wt Readings from Last 3 Encounters:   10/25/23 73 kg (161 lb)   08/03/23 71.2 kg (157 lb)   06/01/23 71.2 kg (157 lb)       Physical Exam  Vitals and nursing note reviewed. Constitutional:       General: She is not in acute distress. Appearance: Normal appearance.  She is not ill-appearing or toxic-appearing. HENT:      Mouth/Throat:      Mouth: Mucous membranes are moist.      Pharynx: No oropharyngeal exudate or posterior oropharyngeal erythema. Comments: No palatal pallor or petechiae  Mucous membranes moist and pink        Eyes:      General: No scleral icterus. Right eye: No discharge. Left eye: No discharge. Extraocular Movements: Extraocular movements intact. Pupils: Pupils are equal, round, and reactive to light. Comments: Good conjunctival coloring     Neck:      Comments: No axillary adenopathy  No supraclavicular lymphadenopathy  Cardiovascular:      Rate and Rhythm: Normal rate and regular rhythm. Pulses: Normal pulses. Heart sounds: Normal heart sounds. No murmur heard. Pulmonary:      Effort: Pulmonary effort is normal. No respiratory distress. Breath sounds: Normal breath sounds. No wheezing, rhonchi or rales. Abdominal:      General: There is no distension. Palpations: There is no mass. Tenderness: There is no abdominal tenderness. There is no guarding or rebound. Comments: No organomegaly     Genitourinary:     Comments: No inguinal adenopathy  Musculoskeletal:      Cervical back: Neck supple. No tenderness. Right lower leg: No edema. Left lower leg: No edema. Lymphadenopathy:      Cervical: No cervical adenopathy. Skin:     General: Skin is warm. Coloration: Skin is not jaundiced or pale. Findings: No bruising or erythema. Comments: No petechiae     Neurological:      General: No focal deficit present. Mental Status: She is alert and oriented to person, place, and time. Psychiatric:         Mood and Affect: Mood normal.         Behavior: Behavior normal.         Thought Content:  Thought content normal.         Judgment: Judgment normal.         Pertinent Laboratory/Diagnostic Studies:  Lab Results   Component Value Date    GLUCOSE 88 11/10/2015    BUN 12 03/11/2022 CREATININE 0.78 03/11/2022    CALCIUM 9.1 11/10/2015     11/10/2015    K 4.2 03/11/2022    CO2 21 03/11/2022     03/11/2022     Lab Results   Component Value Date    ALT 14 03/11/2022    AST 20 03/11/2022    ALKPHOS 86 11/10/2015    BILITOT 0.37 11/10/2015       Lab Results   Component Value Date    WBC 4.0 03/11/2022    HGB 11.9 03/11/2022    HCT 34.1 03/11/2022    MCV 90 03/11/2022     03/11/2022       Lab Results   Component Value Date    TSH 1.020 03/11/2022       Lab Results   Component Value Date    CHOL 195 11/12/2015     Lab Results   Component Value Date    TRIG 59 03/11/2022     Lab Results   Component Value Date    HDL 79 03/11/2022     Lab Results   Component Value Date    LDLCALC 130 (H) 03/11/2022     Lab Results   Component Value Date    HGBA1C 5.6 03/11/2022       Results for orders placed or performed during the hospital encounter of 08/03/23   Tissue Exam   Result Value Ref Range    Case Report       Surgical Pathology Report                         Case: [id number]                                   Authorizing Provider:  Temi Andrew MD    Collected:           08/03/2023 1050              Ordering Location:     [facility]        Received:            08/03/2023 [facility]                                                     Pathologist:           aNveen Reeder MD                                                         Specimens:   A) - Stomach, Gastric R/O H Pylori                                                                  B) - Esophagus, lower esophagus R/O EOE                                                             C) - Esophagus, upper esophagus R/O EOE                                                    Final Diagnosis       A.  Stomach, Gastric R/O H Pylori:      - Gastric antral-type mucosa with focal chronic inactive gastritis      - Negative for intestinal metaplasia, dysplasia or carcinoma      - An H pylori immunohistochemical stain is negative    B. Esophagus, lower esophagus R/O EOE:      - Esophageal squamous mucosa with increased intraepithelial eosinophils (up to 25 per high power field) with intercellular edema, see note    C. Esophagus, upper esophagus R/O EOE:      - Squamous mucosa with no significant histopathologic abnormalities      - No increase in eosinophils       Note       On part B, the number of eosinophils in the lower esophagus is above the threshold for diagnosis of eosinophilic esophagitis (EoE), while upper esophagus (part C) is unremarkable. The findings may represent EoE with patchy disease distribution. However, severe reflux esophagitis overlaps with EoE morphologically and therefore should also be considered. Clinical and endoscopic correlation is recommended. Additional Information       Interpretation performed at [facility] [address] [place] [address]    All reported additional testing was performed with appropriately reactive controls. These tests were developed and their performance characteristics determined by [facility] or appropriate performing facility, though some tests may be performed on tissues which have not been validated for performance characteristics (such as staining performed on alcohol exposed cell blocks and decalcified tissues). Results should be interpreted with caution and in the context of the patients’ clinical condition. These tests may not be cleared or approved by the U.S. Food and Drug Administration, though the FDA has determined that such clearance or approval is not necessary. These tests are used for clinical purposes and they should not be regarded as investigational or for research. This laboratory has been approved by CLIA 88, designated as a high-complexity laboratory and is qualified to perform these tests. Shiva Donaldson Description       A.  The specimen is received in formalin, labeled with the patient's name and hospital number, and is designated "Stomach gastric biopsy rule out H. pylori". The specimen consists of multiple tan irregularly shaped tissue fragments measuring in aggregate 0.8 x 0.5 x 0.1 cm. Entirely submitted. One screened cassette. B. The specimen is received in formalin, labeled with the patient's name and hospital number, and is designated " lower esophagus, rule out eosinophilic esophagitis". The specimen consists of 2 colorless tissue fragments measuring 0.3 and 0.5 cm in greatest dimension. Entirely submitted. One screened cassette. Note: due to the size and consistency of specimen, the tissue may not survive histologic processing. C. The specimen is received in formalin, labeled with the patient's name and hospital number, and is designated " upper esophagus biopsy, rule out eosinophilic esophagitis". The specimen consists of 2 colorless tissue fragments measuring 0.4 cm in greatest dimension each. The specimen is entirely submitted in a screened cassette. Note: The estimated total formalin fixation time based upon information provided by the submitting clinician and the standard processing schedule is under 72 hours. Sudhakar Kilgore      Clinical Information       IMPRESSION: EGD  · The esophagus appeared normal. Performed random biopsy. · 1 cm type I hiatal hernia  · Mild nodular mucosa with erosion in the antrum; performed cold forceps biopsy  · The duodenum appeared normal.       No orders of the defined types were placed in this encounter.       ALLERGIES:  Allergies   Allergen Reactions    Sulfa Antibiotics Other (See Comments)     migraines    Sulfasalazine Other (See Comments)     migraines       Current Medications     Current Outpatient Medications   Medication Sig Dispense Refill    Cetirizine HCl (ZyrTEC Allergy) 10 MG CAPS Take by mouth (Patient not taking: Reported on 6/1/2023)      Cetirizine HCl 10 MG CAPS Take 1 tablet by mouth daily at bedtime       Cholecalciferol (Vitamin D3) 25 MCG (1000 UT) CAPS Take 1 capsule (1,000 Units total) by mouth daily      estradiol (MENOSTAR) 14 MCG/24HR PLACE 1 PATCH ON THE SKIN ONCE WEEKLY      estradiol (Menostar) 14 MCG/24HR  (Patient not taking: Reported on 6/1/2023)      fexofenadine-pseudoephedrine (Allegra-D Allergy & Congestion)  MG per tablet Take by mouth      fexofenadine-pseudoephedrine (ALLEGRA-D)  MG per tablet Take 1 tablet by mouth daily  (Patient not taking: Reported on 6/1/2023)      fluticasone (FLONASE) 50 mcg/act nasal spray 2 sprays into each nostril daily      Melatonin 300 MCG TABS Take by mouth      Multiple Vitamin (MULTIVITAMIN ADULT PO) Take by mouth in the morning      pantoprazole (PROTONIX) 20 mg tablet Take 1 tablet (20 mg total) by mouth daily with breakfast 30 tablet 2    Vitamin D, Cholecalciferol, 50 MCG (2000 UT) CAPS Take by mouth       No current facility-administered medications for this visit.          Health Maintenance     Health Maintenance   Topic Date Due    BMI: Followup Plan  Never done    Osteoporosis Screening  Never done    PT PLAN OF CARE  04/15/2021    COVID-19 Vaccine (4 - Kaye Mom series) 06/17/2022    Breast Cancer Screening: Mammogram  08/23/2023    Influenza Vaccine (1) 09/01/2023    Annual Physical  10/20/2023    Depression Screening  10/25/2024    BMI: Adult  10/25/2024    Colorectal Cancer Screening  01/14/2030    DTaP,Tdap,and Td Vaccines (3 - Td or Tdap) 09/15/2030    HIV Screening  Completed    Hepatitis C Screening  Completed    Pneumococcal Vaccine: Pediatrics (0 to 5 Years) and At-Risk Patients (6 to 59 Years)  Aged Out    HIB Vaccine  Aged Out    IPV Vaccine  Aged Out    Hepatitis A Vaccine  Aged Out    Meningococcal ACWY Vaccine  Aged Out    HPV Vaccine  Aged [employer] History   Administered Date(s) Administered    COVID-19 MODERNA VACC 0.5 ML IM 01/12/2021, 02/09/2021, 04/22/2022    INFLUENZA 11/10/2015, 11/05/2016, 08/15/2017, 10/01/2019, 08/18/2020    Influenza Quadrivalent, 6-35 Months IM 11/10/2015    Influenza, injectable, quadrivalent, preservative free 0.5 mL 08/18/2020    Influenza, recombinant, quadrivalent,injectable, preservative free 09/06/2018, 10/20/2021, 10/20/2022    Influenza, seasonal, injectable 03/15/2011, 09/14/2011    Tdap 03/15/2011, 09/15/2020     BMI Counseling: Body mass index is 25.22 kg/m². The BMI is above normal. Nutrition recommendations include decreasing portion sizes, encouraging healthy choices of fruits and vegetables, decreasing fast food intake, consuming healthier snacks, limiting drinks that contain sugar, moderation in carbohydrate intake, increasing intake of lean protein, reducing intake of saturated and trans fat and reducing intake of cholesterol. Exercise recommendations include exercising 3-5 times per week. No pharmacotherapy was ordered. Rationale for BMI follow-up plan is due to patient being overweight or obese. Depression Screening and Follow-up Plan: Patient was screened for depression during today's encounter. They screened negative with a PHQ-2 score of 0.         Anamika Bess,

## 2023-10-26 DIAGNOSIS — K20.90 ESOPHAGITIS: ICD-10-CM

## 2023-10-26 RX ORDER — PANTOPRAZOLE SODIUM 20 MG/1
20 TABLET, DELAYED RELEASE ORAL
Qty: 30 TABLET | Refills: 5 | Status: SHIPPED | OUTPATIENT
Start: 2023-10-26

## 2023-10-28 ENCOUNTER — LAB (OUTPATIENT)
Dept: LAB | Facility: MEDICAL CENTER | Age: 63
End: 2023-10-28
Payer: COMMERCIAL

## 2023-10-28 DIAGNOSIS — M79.605 PAIN IN BOTH LOWER EXTREMITIES: ICD-10-CM

## 2023-10-28 DIAGNOSIS — M79.604 PAIN IN BOTH LOWER EXTREMITIES: ICD-10-CM

## 2023-10-28 DIAGNOSIS — R23.3 ABNORMAL BRUISING: ICD-10-CM

## 2023-10-28 DIAGNOSIS — E78.2 MIXED HYPERLIPIDEMIA: ICD-10-CM

## 2023-10-28 LAB
ALBUMIN SERPL BCP-MCNC: 4.2 G/DL (ref 3.5–5)
ALP SERPL-CCNC: 79 U/L (ref 34–104)
ALT SERPL W P-5'-P-CCNC: 14 U/L (ref 7–52)
ANION GAP SERPL CALCULATED.3IONS-SCNC: 8 MMOL/L
APTT PPP: 30 SECONDS (ref 23–37)
AST SERPL W P-5'-P-CCNC: 20 U/L (ref 13–39)
BASOPHILS # BLD AUTO: 0.06 THOUSANDS/ÂΜL (ref 0–0.1)
BASOPHILS NFR BLD AUTO: 2 % (ref 0–1)
BILIRUB SERPL-MCNC: 0.41 MG/DL (ref 0.2–1)
BUN SERPL-MCNC: 12 MG/DL (ref 5–25)
CALCIUM SERPL-MCNC: 9.2 MG/DL (ref 8.4–10.2)
CHLORIDE SERPL-SCNC: 104 MMOL/L (ref 96–108)
CHOLEST SERPL-MCNC: 213 MG/DL
CK SERPL-CCNC: 58 U/L (ref 26–192)
CO2 SERPL-SCNC: 29 MMOL/L (ref 21–32)
CREAT SERPL-MCNC: 0.91 MG/DL (ref 0.6–1.3)
CRP SERPL QL: 1.5 MG/L
EOSINOPHIL # BLD AUTO: 0.29 THOUSAND/ÂΜL (ref 0–0.61)
EOSINOPHIL NFR BLD AUTO: 7 % (ref 0–6)
ERYTHROCYTE [DISTWIDTH] IN BLOOD BY AUTOMATED COUNT: 12.9 % (ref 11.6–15.1)
ERYTHROCYTE [SEDIMENTATION RATE] IN BLOOD: 4 MM/HOUR (ref 0–29)
FERRITIN SERPL-MCNC: 22 NG/ML (ref 11–307)
GFR SERPL CREATININE-BSD FRML MDRD: 68 ML/MIN/1.73SQ M
GLUCOSE P FAST SERPL-MCNC: 88 MG/DL (ref 65–99)
HCT VFR BLD AUTO: 39.4 % (ref 34.8–46.1)
HDLC SERPL-MCNC: 71 MG/DL
HGB BLD-MCNC: 12.8 G/DL (ref 11.5–15.4)
IMM GRANULOCYTES # BLD AUTO: 0.01 THOUSAND/UL (ref 0–0.2)
IMM GRANULOCYTES NFR BLD AUTO: 0 % (ref 0–2)
INR PPP: 0.98 (ref 0.84–1.19)
IRON SATN MFR SERPL: 20 % (ref 15–50)
IRON SERPL-MCNC: 69 UG/DL (ref 50–212)
LDLC SERPL CALC-MCNC: 130 MG/DL (ref 0–100)
LYMPHOCYTES # BLD AUTO: 1.33 THOUSANDS/ÂΜL (ref 0.6–4.47)
LYMPHOCYTES NFR BLD AUTO: 33 % (ref 14–44)
MAGNESIUM SERPL-MCNC: 2.1 MG/DL (ref 1.9–2.7)
MCH RBC QN AUTO: 30.6 PG (ref 26.8–34.3)
MCHC RBC AUTO-ENTMCNC: 32.5 G/DL (ref 31.4–37.4)
MCV RBC AUTO: 94 FL (ref 82–98)
MONOCYTES # BLD AUTO: 0.39 THOUSAND/ÂΜL (ref 0.17–1.22)
MONOCYTES NFR BLD AUTO: 10 % (ref 4–12)
NEUTROPHILS # BLD AUTO: 1.96 THOUSANDS/ÂΜL (ref 1.85–7.62)
NEUTS SEG NFR BLD AUTO: 48 % (ref 43–75)
NONHDLC SERPL-MCNC: 142 MG/DL
NRBC BLD AUTO-RTO: 0 /100 WBCS
PLATELET # BLD AUTO: 202 THOUSANDS/UL (ref 149–390)
PMV BLD AUTO: 9.9 FL (ref 8.9–12.7)
POTASSIUM SERPL-SCNC: 4.2 MMOL/L (ref 3.5–5.3)
PROT SERPL-MCNC: 6.3 G/DL (ref 6.4–8.4)
PROTHROMBIN TIME: 12.9 SECONDS (ref 11.6–14.5)
RBC # BLD AUTO: 4.18 MILLION/UL (ref 3.81–5.12)
SODIUM SERPL-SCNC: 141 MMOL/L (ref 135–147)
TIBC SERPL-MCNC: 348 UG/DL (ref 250–450)
TRIGL SERPL-MCNC: 58 MG/DL
TSH SERPL DL<=0.05 MIU/L-ACNC: 0.96 UIU/ML (ref 0.45–4.5)
UIBC SERPL-MCNC: 279 UG/DL (ref 155–355)
WBC # BLD AUTO: 4.04 THOUSAND/UL (ref 4.31–10.16)

## 2023-10-28 PROCEDURE — 85025 COMPLETE CBC W/AUTO DIFF WBC: CPT

## 2023-10-28 PROCEDURE — 86140 C-REACTIVE PROTEIN: CPT

## 2023-10-28 PROCEDURE — 83735 ASSAY OF MAGNESIUM: CPT

## 2023-10-28 PROCEDURE — 84443 ASSAY THYROID STIM HORMONE: CPT

## 2023-10-28 PROCEDURE — 85610 PROTHROMBIN TIME: CPT

## 2023-10-28 PROCEDURE — 82550 ASSAY OF CK (CPK): CPT

## 2023-10-28 PROCEDURE — 83550 IRON BINDING TEST: CPT

## 2023-10-28 PROCEDURE — 85730 THROMBOPLASTIN TIME PARTIAL: CPT

## 2023-10-28 PROCEDURE — 82728 ASSAY OF FERRITIN: CPT

## 2023-10-28 PROCEDURE — 80061 LIPID PANEL: CPT

## 2023-10-28 PROCEDURE — 80053 COMPREHEN METABOLIC PANEL: CPT

## 2023-10-28 PROCEDURE — 85652 RBC SED RATE AUTOMATED: CPT

## 2023-10-28 PROCEDURE — 83540 ASSAY OF IRON: CPT

## 2023-10-28 PROCEDURE — 36415 COLL VENOUS BLD VENIPUNCTURE: CPT

## 2023-10-30 NOTE — RESULT ENCOUNTER NOTE
Hi marixa  Your labs are overall normal.  Slightly low normal iron stores - so would recommend a womens one a day complete multi-vitamin that contains iron. Otherwise  - nothing in labs to explain unusual bruising that you experienced. I would keep an eye on things and let me know if anything else happens. Otherwise - I will see you in December! Have a nice day -   Dr Flor Gamboa.

## 2023-12-06 ENCOUNTER — OFFICE VISIT (OUTPATIENT)
Dept: FAMILY MEDICINE CLINIC | Facility: CLINIC | Age: 63
End: 2023-12-06
Payer: COMMERCIAL

## 2023-12-06 VITALS
RESPIRATION RATE: 18 BRPM | HEIGHT: 67 IN | BODY MASS INDEX: 25.18 KG/M2 | WEIGHT: 160.4 LBS | SYSTOLIC BLOOD PRESSURE: 130 MMHG | DIASTOLIC BLOOD PRESSURE: 80 MMHG | HEART RATE: 91 BPM | OXYGEN SATURATION: 100 % | TEMPERATURE: 97 F

## 2023-12-06 DIAGNOSIS — J30.9 ALLERGIC RHINITIS, UNSPECIFIED SEASONALITY, UNSPECIFIED TRIGGER: ICD-10-CM

## 2023-12-06 DIAGNOSIS — Z78.0 POST-MENOPAUSAL: ICD-10-CM

## 2023-12-06 DIAGNOSIS — E78.2 MIXED HYPERLIPIDEMIA: ICD-10-CM

## 2023-12-06 DIAGNOSIS — Z00.00 PHYSICAL EXAM: Primary | ICD-10-CM

## 2023-12-06 DIAGNOSIS — K22.2 ESOPHAGEAL STRICTURE: ICD-10-CM

## 2023-12-06 PROCEDURE — G0439 PPPS, SUBSEQ VISIT: HCPCS | Performed by: FAMILY MEDICINE

## 2023-12-06 PROCEDURE — 99214 OFFICE O/P EST MOD 30 MIN: CPT | Performed by: FAMILY MEDICINE

## 2023-12-06 NOTE — PROGRESS NOTES
FAMILY PRACTICE OFFICE VISIT    NAME: Gracia Rachel Goins    AGE: 66 y.o. SEX: female  : 1960   MRN: [id number]    DATE: 2023  TIME: 10:37 AM    Assessment and Plan    1. Post-menopausal    - DXA bone density spine hip and pelvis; Future    2. Physical exam  Anticipatory guidance and preventative medicine discussed  Had flushot  Mammo up to date  Colonoscopy up to date  Goes to eye dr and dentist.  Return In1 year        3. Esophageal stricture  GERD controlledon PPI. 4. Allergic rhinitis, unspecified seasonality, unspecified trigger  Discussed stopping zyrtec and floanse as these could be worsening nose bleeds. 5. Mixed hyperlipidemia    Good control overall  Pt will add back some exercise. There are no Patient Instructions on file for this visit. Patient Instructions   Recommend exercising on a regular basis. Schedule dexa          Chief Complaint     Chief Complaint   Patient presents with    Physical Exam       History of Present Illness   Jazmine Kelly is a 66y.o.-year-old female who presents today for shortterm f/u  Had labs done after last visit. Review of Systems   Review of Systems   Constitutional:  Negative for unexpected weight change. HENT:  Positive for nosebleeds. Has been having nosebleeds - just about daily - unilateral  Is using a different type of heating while waiting for furnace. Respiratory:  Negative for cough, shortness of breath and wheezing. Cardiovascular:  Negative for chest pain and palpitations. Hematological:         No more unusual bruising     Psychiatric/Behavioral:  Negative for dysphoric mood, self-injury, sleep disturbance and suicidal ideas. The patient is not nervous/anxious.         Active Problem List     Patient Active Problem List   Diagnosis    Allergic rhinitis    Closed fracture of right inferior pubic ramus (HCC)    Closed fracture of right superior pubic ramus (HCC)    History of pelvic surgery Dermatitis    Bilateral hearing loss    Lower abdominal pain    COVID-19 virus infection in vaccinated adult    Esophageal stricture    Mixed hyperlipidemia    Urinary incontinence         Past Medical History:  Past Medical History:   Diagnosis Date    Allergic rhinitis     Gets immunotherapy with Dr. Lauro Xiao esophagus A long time ago    Have issues with eating rice and other foods    Colon polyp     Diabetes mellitus ([address]) TESTING IN MAY 2019    FAMILY HISTORY    Dysphagia     GERD (gastroesophageal reflux disease)     Hernia 2019    Discovered after falling    Irritable bowel syndrome 2/7/1999    Had a lot of stress at the time    Vertigo     LAST ASSESSED 3/8/13; RESOLVED 11/10/15       Past Surgical History:  Past Surgical History:   Procedure Laterality Date    ABDOMINAL SURGERY      BLADDER SUSPENSION      BUNIONECTOMY Right     with implants    COLONOSCOPY      Approx 10 years sfo Dr Guero Keen with IBS    HERNIA REPAIR  2021    Had hernia repaired    TOTAL ABDOMINAL HYSTERECTOMY  2006    WITH REMOVAL OF BOTH OVARIES     UPPER GASTROINTESTINAL ENDOSCOPY      Approx 10 yrs ago Dr Tonny Singh.   Also had esophagus stretched       Family History:  Family History   Problem Relation Age of Onset    Other Mother         MULTIPLE MYELOMA    Cancer Mother         Myeloma for 21 yrs    Diabetes Father     Melanoma Father     Prostate cancer Father     Diabetes Brother         Diabetes    Hypertension Brother     Diabetes Brother         Diabetes       Social History:  Social History     Socioeconomic History    Marital status: /Civil Union     Spouse name: Not on file    Number of children: Not on file    Years of education: Not on file    Highest education level: Not on file   Occupational History    Not on file   Tobacco Use    Smoking status: Never    Smokeless tobacco: Never   Vaping Use    Vaping Use: Never used   Substance and Sexual Activity Alcohol use: Yes     Comment: occasional    Drug use: No    Sexual activity: Not Currently     Partners: Male     Birth control/protection: Surgical     Comment: Hysterectomy   Other Topics Concern    Not on file   Social History Narrative    Daily caffeine use- 2 cups of iced tea, 2-3 cups of hot tea     Social Determinants of Health     Financial Resource Strain: Not on file   Food Insecurity: Not on file   Transportation Needs: Not on file   Physical Activity: Not on file   Stress: Not on file   Social Connections: Not on file   Intimate Partner Violence: Not on file   Housing Stability: Not on file       Objective     Vitals:    12/06/23 1029   BP: 130/80   Pulse: 91   Resp: 18   Temp: (!) 97 °F (36.1 °C)   SpO2: 100%     Wt Readings from Last 3 Encounters:   12/06/23 72.8 kg (160 lb 6.4 oz)   10/25/23 73 kg (161 lb)   08/03/23 71.2 kg (157 lb)       Physical Exam  Vitals and nursing note reviewed. Constitutional:       General: She is not in acute distress. Appearance: Normal appearance. She is not ill-appearing or toxic-appearing. Comments: Appears younger than stated age   HENT:      Head: Normocephalic. Right Ear: Tympanic membrane normal.      Left Ear: Tympanic membrane normal.      Nose: No congestion or rhinorrhea. Mouth/Throat:      Mouth: Mucous membranes are moist.      Pharynx: Oropharynx is clear. No oropharyngeal exudate or posterior oropharyngeal erythema. Comments: Mucous membranes moist and pink  No tonsillar exudates    Eyes:      General: No scleral icterus. Conjunctiva/sclera: Conjunctivae normal.      Pupils: Pupils are equal, round, and reactive to light. Neck:      Vascular: No carotid bruit. Cardiovascular:      Rate and Rhythm: Normal rate and regular rhythm. Pulses: Normal pulses. Heart sounds: Normal heart sounds. No murmur heard. Pulmonary:      Effort: Pulmonary effort is normal. No respiratory distress.       Breath sounds: Normal breath sounds. No stridor. No wheezing, rhonchi or rales. Abdominal:      General: Abdomen is flat. There is no distension. Palpations: There is no mass. Tenderness: There is no abdominal tenderness. There is no guarding or rebound. Musculoskeletal:         General: No swelling, tenderness or deformity. Cervical back: Normal range of motion and neck supple. No rigidity. No muscular tenderness. Right lower leg: No edema. Left lower leg: No edema. Comments: No calf tenderness   Lymphadenopathy:      Cervical: No cervical adenopathy. Skin:     General: Skin is warm. Capillary Refill: Capillary refill takes less than 2 seconds. Coloration: Skin is not jaundiced or pale. Findings: No lesion or rash. Neurological:      General: No focal deficit present. Mental Status: She is alert and oriented to person, place, and time. Cranial Nerves: No cranial nerve deficit. Sensory: No sensory deficit. Motor: No weakness. Gait: Gait normal.      Deep Tendon Reflexes: Reflexes normal.   Psychiatric:         Mood and Affect: Mood normal.         Behavior: Behavior normal.         Thought Content:  Thought content normal.         Judgment: Judgment normal.      Comments: Pt very pleasant and happy with life in general         Pertinent Laboratory/Diagnostic Studies:  Lab Results   Component Value Date    GLUCOSE 88 11/10/2015    BUN 12 10/28/2023    CREATININE 0.91 10/28/2023    CALCIUM 9.2 10/28/2023     11/10/2015    K 4.2 10/28/2023    CO2 29 10/28/2023     10/28/2023     Lab Results   Component Value Date    ALT 14 10/28/2023    AST 20 10/28/2023    ALKPHOS 79 10/28/2023    BILITOT 0.37 11/10/2015       Lab Results   Component Value Date    WBC 4.04 (L) 10/28/2023    HGB 12.8 10/28/2023    HCT 39.4 10/28/2023    MCV 94 10/28/2023     10/28/2023       Lab Results   Component Value Date    TSH 1.020 03/11/2022       Lab Results   Component Value Date    CHOL 195 11/12/2015     Lab Results   Component Value Date    TRIG 58 10/28/2023     Lab Results   Component Value Date    HDL 71 10/28/2023     Lab Results   Component Value Date    LDLCALC 130 (H) 10/28/2023     Lab Results   Component Value Date    HGBA1C 5.6 03/11/2022       Results for orders placed or performed in visit on 10/28/23   CBC and differential   Result Value Ref Range    WBC 4.04 (L) 4.31 - 10.16 Thousand/uL    RBC 4.18 3.81 - 5.12 Million/uL    Hemoglobin 12.8 11.5 - 15.4 g/dL    Hematocrit 39.4 34.8 - 46.1 %    MCV 94 82 - 98 fL    MCH 30.6 26.8 - 34.3 pg    MCHC 32.5 31.4 - 37.4 g/dL    RDW 12.9 11.6 - 15.1 %    MPV 9.9 8.9 - 12.7 fL    Platelets 551 034 - 920 Thousands/uL    nRBC 0 /100 WBCs    Neutrophils Relative 48 43 - 75 %    Immat GRANS % 0 0 - 2 %    Lymphocytes Relative 33 14 - 44 %    Monocytes Relative 10 4 - 12 %    Eosinophils Relative 7 (H) 0 - 6 %    Basophils Relative 2 (H) 0 - 1 %    Neutrophils Absolute 1.96 1.85 - 7.62 Thousands/µL    Immature Grans Absolute 0.01 0.00 - 0.20 Thousand/uL    Lymphocytes Absolute 1.33 0.60 - 4.47 Thousands/µL    Monocytes Absolute 0.39 0.17 - 1.22 Thousand/µL    Eosinophils Absolute 0.29 0.00 - 0.61 Thousand/µL    Basophils Absolute 0.06 0.00 - 0.10 Thousands/µL   Lipid panel   Result Value Ref Range    Cholesterol 213 (H) See Comment mg/dL    Triglycerides 58 See Comment mg/dL    HDL, Direct 71 >=50 mg/dL    LDL Calculated 130 (H) 0 - 100 mg/dL    Non-HDL-Chol (CHOL-HDL) 142 mg/dl   Comprehensive metabolic panel   Result Value Ref Range    Sodium 141 135 - 147 mmol/L    Potassium 4.2 3.5 - 5.3 mmol/L    Chloride 104 96 - 108 mmol/L    CO2 29 21 - 32 mmol/L    ANION GAP 8 mmol/L    BUN 12 5 - 25 mg/dL    Creatinine 0.91 0.60 - 1.30 mg/dL    Glucose, Fasting 88 65 - 99 mg/dL    Calcium 9.2 8.4 - 10.2 mg/dL    AST 20 13 - 39 U/L    ALT 14 7 - 52 U/L    Alkaline Phosphatase 79 34 - 104 U/L    Total Protein 6.3 (L) 6.4 - 8.4 g/dL    Albumin 4.2 3.5 - 5.0 g/dL    Total Bilirubin 0.41 0.20 - 1.00 mg/dL    eGFR 68 ml/min/1.73sq m   Magnesium   Result Value Ref Range    Magnesium 2.1 1.9 - 2.7 mg/dL   TSH, 3rd generation   Result Value Ref Range    TSH 3RD GENERATON 0.958 0.450 - 4.500 uIU/mL   CK   Result Value Ref Range    Total CK 58 26 - 192 U/L   Protime-INR   Result Value Ref Range    Protime 12.9 11.6 - 14.5 seconds    INR 0.98 0.84 - 1.19   APTT   Result Value Ref Range    PTT 30 23 - 37 seconds   C-reactive protein   Result Value Ref Range    CRP 1.5 <3.0 mg/L   Sedimentation rate, automated   Result Value Ref Range    Sed Rate 4 0 - 29 mm/hour   TIBC Panel (incl. Iron, TIBC, % Iron Saturation)   Result Value Ref Range    Iron Saturation 20 15 - 50 %    TIBC 348 250 - 450 ug/dL    Iron 69 50 - 212 ug/dL    UIBC 279 155 - 355 ug/dL   Ferritin   Result Value Ref Range    Ferritin 22 11 - 307 ng/mL       No orders of the defined types were placed in this encounter.       ALLERGIES:  Allergies   Allergen Reactions    Sulfa Antibiotics Other (See Comments)     migraines    Sulfasalazine Other (See Comments)     migraines       Current Medications     Current Outpatient Medications   Medication Sig Dispense Refill    Cetirizine HCl 10 MG CAPS Take 1 tablet by mouth daily at bedtime       Cholecalciferol (Vitamin D3) 25 MCG (1000 UT) CAPS Take 1 capsule (1,000 Units total) by mouth daily      estradiol (MENOSTAR) 14 MCG/24HR PLACE 1 PATCH ON THE SKIN ONCE WEEKLY      fexofenadine-pseudoephedrine (Allegra-D Allergy & Congestion)  MG per tablet Take by mouth      fluticasone (FLONASE) 50 mcg/act nasal spray 2 sprays into each nostril daily      Melatonin 300 MCG TABS Take by mouth      Multiple Vitamin (MULTIVITAMIN ADULT PO) Take by mouth in the morning      pantoprazole (PROTONIX) 20 mg tablet take 1 tablet by mouth once daily with breakfast 30 tablet 5    Vitamin D, Cholecalciferol, 50 MCG (2000 UT) CAPS Take by mouth      Cetirizine HCl (ZyrTEC Allergy) 10 MG CAPS Take by mouth (Patient not taking: Reported on 6/1/2023)      estradiol (Menostar) 14 MCG/24HR  (Patient not taking: Reported on 6/1/2023)      fexofenadine-pseudoephedrine (ALLEGRA-D)  MG per tablet Take 1 tablet by mouth daily  (Patient not taking: Reported on 6/1/2023)       No current facility-administered medications for this visit. Health Maintenance     Health Maintenance   Topic Date Due    Osteoporosis Screening  Never done    PT PLAN OF CARE  04/15/2021    COVID-19 Vaccine (4 - Maria Isabel Brooking series) 06/17/2022    Annual Physical  10/20/2023    Breast Cancer Screening: Mammogram  08/24/2024    Depression Screening  10/25/2024    BMI: Followup Plan  10/25/2024    BMI: Adult  10/25/2024    Colorectal Cancer Screening  01/14/2030    DTaP,Tdap,and Td Vaccines (3 - Td or Tdap) 09/15/2030    HIV Screening  Completed    Hepatitis C Screening  Completed    Influenza Vaccine  Completed    Pneumococcal Vaccine: Pediatrics (0 to 5 Years) and At-Risk Patients (6 to 59 Years)  Aged Out    HIB Vaccine  Aged Out    IPV Vaccine  Aged Out    Hepatitis A Vaccine  Aged Out    Meningococcal ACWY Vaccine  Aged Out    HPV Vaccine  Aged [employer] History   Administered Date(s) Administered    COVID-19 MODERNA VACC 0.5 ML IM 01/12/2021, 02/09/2021, 04/22/2022    INFLUENZA 11/10/2015, 11/05/2016, 08/15/2017, 10/01/2019, 08/18/2020    Influenza Quadrivalent, 6-35 Months IM 11/10/2015    Influenza, injectable, quadrivalent, preservative free 0.5 mL 08/18/2020, 10/25/2023    Influenza, recombinant, quadrivalent,injectable, preservative free 09/06/2018, 10/20/2021, 10/20/2022    Influenza, seasonal, injectable 03/15/2011, 09/14/2011    Tdap 03/15/2011, 09/15/2020       Depression Screening and Follow-up Plan: Patient was screened for depression during today's encounter. They screened negative with a PHQ-2 score of 0.         dAriana Cordoba DO

## 2024-02-19 ENCOUNTER — APPOINTMENT (EMERGENCY)
Dept: CT IMAGING | Facility: HOSPITAL | Age: 64
End: 2024-02-19
Payer: COMMERCIAL

## 2024-02-19 ENCOUNTER — AMB VIDEO VISIT (OUTPATIENT)
Dept: OTHER | Facility: HOSPITAL | Age: 64
End: 2024-02-19

## 2024-02-19 ENCOUNTER — NURSE TRIAGE (OUTPATIENT)
Age: 64
End: 2024-02-19

## 2024-02-19 ENCOUNTER — HOSPITAL ENCOUNTER (EMERGENCY)
Facility: HOSPITAL | Age: 64
Discharge: HOME | End: 2024-02-19
Attending: EMERGENCY MEDICINE | Admitting: EMERGENCY MEDICINE
Payer: COMMERCIAL

## 2024-02-19 VITALS
TEMPERATURE: 97.5 F | DIASTOLIC BLOOD PRESSURE: 74 MMHG | BODY MASS INDEX: 25.71 KG/M2 | OXYGEN SATURATION: 98 % | RESPIRATION RATE: 18 BRPM | WEIGHT: 163.8 LBS | SYSTOLIC BLOOD PRESSURE: 131 MMHG | HEIGHT: 67 IN | HEART RATE: 64 BPM

## 2024-02-19 DIAGNOSIS — S09.90XA INJURY OF HEAD, INITIAL ENCOUNTER: Primary | ICD-10-CM

## 2024-02-19 DIAGNOSIS — K59.00 CONSTIPATION: ICD-10-CM

## 2024-02-19 DIAGNOSIS — S06.0XAA CONCUSSION: Primary | ICD-10-CM

## 2024-02-19 LAB
ALBUMIN SERPL BCP-MCNC: 4.4 G/DL (ref 3.5–5)
ALP SERPL-CCNC: 70 U/L (ref 34–104)
ALT SERPL W P-5'-P-CCNC: 13 U/L (ref 7–52)
ANION GAP SERPL CALCULATED.3IONS-SCNC: 6 MMOL/L
AST SERPL W P-5'-P-CCNC: 23 U/L (ref 13–39)
BASOPHILS # BLD AUTO: 0.05 THOUSANDS/ÂΜL (ref 0–0.1)
BASOPHILS NFR BLD AUTO: 1 % (ref 0–1)
BILIRUB SERPL-MCNC: 0.45 MG/DL (ref 0.2–1)
BUN SERPL-MCNC: 14 MG/DL (ref 5–25)
CALCIUM SERPL-MCNC: 9.2 MG/DL (ref 8.4–10.2)
CHLORIDE SERPL-SCNC: 104 MMOL/L (ref 96–108)
CO2 SERPL-SCNC: 29 MMOL/L (ref 21–32)
CREAT SERPL-MCNC: 0.8 MG/DL (ref 0.6–1.3)
EOSINOPHIL # BLD AUTO: 0.32 THOUSAND/ÂΜL (ref 0–0.61)
EOSINOPHIL NFR BLD AUTO: 6 % (ref 0–6)
ERYTHROCYTE [DISTWIDTH] IN BLOOD BY AUTOMATED COUNT: 12.7 % (ref 11.6–15.1)
GFR SERPL CREATININE-BSD FRML MDRD: 79 ML/MIN/1.73SQ M
GLUCOSE SERPL-MCNC: 93 MG/DL (ref 65–140)
HCT VFR BLD AUTO: 40.4 % (ref 34.8–46.1)
HGB BLD-MCNC: 13.4 G/DL (ref 11.5–15.4)
IMM GRANULOCYTES # BLD AUTO: 0.01 THOUSAND/UL (ref 0–0.2)
IMM GRANULOCYTES NFR BLD AUTO: 0 % (ref 0–2)
LYMPHOCYTES # BLD AUTO: 1.8 THOUSANDS/ÂΜL (ref 0.6–4.47)
LYMPHOCYTES NFR BLD AUTO: 32 % (ref 14–44)
MCH RBC QN AUTO: 31 PG (ref 26.8–34.3)
MCHC RBC AUTO-ENTMCNC: 33.2 G/DL (ref 31.4–37.4)
MCV RBC AUTO: 94 FL (ref 82–98)
MONOCYTES # BLD AUTO: 0.44 THOUSAND/ÂΜL (ref 0.17–1.22)
MONOCYTES NFR BLD AUTO: 8 % (ref 4–12)
NEUTROPHILS # BLD AUTO: 3.02 THOUSANDS/ÂΜL (ref 1.85–7.62)
NEUTS SEG NFR BLD AUTO: 53 % (ref 43–75)
NRBC BLD AUTO-RTO: 0 /100 WBCS
PLATELET # BLD AUTO: 222 THOUSANDS/UL (ref 149–390)
PMV BLD AUTO: 9.4 FL (ref 8.9–12.7)
POTASSIUM SERPL-SCNC: 4.5 MMOL/L (ref 3.5–5.3)
PROT SERPL-MCNC: 6.9 G/DL (ref 6.4–8.4)
RBC # BLD AUTO: 4.32 MILLION/UL (ref 3.81–5.12)
SODIUM SERPL-SCNC: 139 MMOL/L (ref 135–147)
WBC # BLD AUTO: 5.64 THOUSAND/UL (ref 4.31–10.16)

## 2024-02-19 PROCEDURE — 72125 CT NECK SPINE W/O DYE: CPT

## 2024-02-19 PROCEDURE — G1004 CDSM NDSC: HCPCS

## 2024-02-19 PROCEDURE — 96375 TX/PRO/DX INJ NEW DRUG ADDON: CPT

## 2024-02-19 PROCEDURE — 99284 EMERGENCY DEPT VISIT MOD MDM: CPT

## 2024-02-19 PROCEDURE — 36415 COLL VENOUS BLD VENIPUNCTURE: CPT

## 2024-02-19 PROCEDURE — 96361 HYDRATE IV INFUSION ADD-ON: CPT

## 2024-02-19 PROCEDURE — 85025 COMPLETE CBC W/AUTO DIFF WBC: CPT

## 2024-02-19 PROCEDURE — 74176 CT ABD & PELVIS W/O CONTRAST: CPT

## 2024-02-19 PROCEDURE — 96374 THER/PROPH/DIAG INJ IV PUSH: CPT

## 2024-02-19 PROCEDURE — 99284 EMERGENCY DEPT VISIT MOD MDM: CPT | Performed by: EMERGENCY MEDICINE

## 2024-02-19 PROCEDURE — ECARE PR SL URGENT CARE VIRTUAL VISIT: Performed by: NURSE PRACTITIONER

## 2024-02-19 PROCEDURE — 80053 COMPREHEN METABOLIC PANEL: CPT

## 2024-02-19 RX ORDER — KETOROLAC TROMETHAMINE 30 MG/ML
15 INJECTION, SOLUTION INTRAMUSCULAR; INTRAVENOUS ONCE
Status: COMPLETED | OUTPATIENT
Start: 2024-02-19 | End: 2024-02-19

## 2024-02-19 RX ORDER — ONDANSETRON 2 MG/ML
4 INJECTION INTRAMUSCULAR; INTRAVENOUS ONCE
Status: COMPLETED | OUTPATIENT
Start: 2024-02-19 | End: 2024-02-19

## 2024-02-19 RX ADMIN — KETOROLAC TROMETHAMINE 15 MG: 30 INJECTION, SOLUTION INTRAMUSCULAR; INTRAVENOUS at 14:39

## 2024-02-19 RX ADMIN — SODIUM CHLORIDE 1000 ML: 0.9 INJECTION, SOLUTION INTRAVENOUS at 14:42

## 2024-02-19 RX ADMIN — ONDANSETRON 4 MG: 2 INJECTION INTRAMUSCULAR; INTRAVENOUS at 14:38

## 2024-02-19 NOTE — PROGRESS NOTES
Required Documentation:  Encounter provider MORGAN Em    Provider located at [facility]  VIRTUAL CARE   [address] [id number]    Identify all parties in room with patient during virtual visit:  No one else    The patient was identified by name and date of birth. Karen Goins was informed that this is a telemedicine visit and that the visit is being conducted through the [employer] Mobile platform. She agrees to proceed..  My office door was closed. No one else was in the room.  She acknowledged consent and understanding of privacy and security of the video platform. The patient has agreed to participate and understands they can discontinue the visit at any time.    Verification of patient location:    Patient is located at Home in the following state in which I hold an active license [place]    Patient is aware this is a billable service.     Reason for visit is No chief complaint on file.       Subjective  This is a 63 year old female here today for video visit.  She states on 2/17 she slipped on ice and hit the back of her head.  She states she got up and felt dizzy.  She states she was able to drive home.  She states she was able to get home. She had a large hematoma on the back off her head.  She states it was about the size of gold ball.  She still is still feeling off and having some dizziness and feels off.  She did call her PCP which recommend she go to ER.             Past Medical History:   Diagnosis Date    Allergic rhinitis     Gets immunotherapy with Dr. Carolina    Sheehan esophagus A long time ago    Have issues with eating rice and other foods    Colon polyp     Diabetes mellitus (HCC) TESTING IN MAY 2019    FAMILY HISTORY    Dysphagia     GERD (gastroesophageal reflux disease)     Hernia 2019    Discovered after falling    Irritable bowel syndrome 2/7/1999    Had a lot of stress at the time    Vertigo     LAST ASSESSED 3/8/13;  RESOLVED 11/10/15       Past Surgical History:   Procedure Laterality Date    ABDOMINAL SURGERY      BLADDER SUSPENSION      BUNIONECTOMY Right     with implants    COLONOSCOPY      Approx 10 years sfo Dr Ansari    EGD      FLEXIBLE SIGMOIDOSCOPY  1999    Disgnosed with IBS    HERNIA REPAIR  2021    Had hernia repaired    TOTAL ABDOMINAL HYSTERECTOMY  2006    WITH REMOVAL OF BOTH OVARIES     UPPER GASTROINTESTINAL ENDOSCOPY      Approx 10 yrs ago Dr Ansari.  Also had esophagus stretched        Allergies   Allergen Reactions    Sulfa Antibiotics Other (See Comments)     migraines    Sulfasalazine Other (See Comments)     migraines       Review of Systems   Constitutional: Negative.    Respiratory: Negative.     Cardiovascular: Negative.    Neurological:  Positive for dizziness.   Psychiatric/Behavioral: Negative.         Video Exam    There were no vitals filed for this visit.    Physical Exam  Constitutional:       General: She is not in acute distress.     Appearance: Normal appearance. She is not ill-appearing or toxic-appearing.   Pulmonary:      Effort: Pulmonary effort is normal. No respiratory distress.   Neurological:      Mental Status: She is alert and oriented to person, place, and time.   Psychiatric:         Mood and Affect: Mood normal.         Behavior: Behavior normal.         Thought Content: Thought content normal.         Judgment: Judgment normal.         Visit Time  Total Visit Duration: 8 minutes    Assessment/Plan:    Diagnoses and all orders for this visit:    Injury of head, initial encounter  -     Transfer to other facility        Patient Instructions   As we discussed, my recommendation will be the same as your PCP and go to ER for evaluation.  You were agreeable and will go to [facility].  You will have your  drive you.

## 2024-02-19 NOTE — ED ATTENDING ATTESTATION
2/19/2024  I, Mitesh Garza MD, saw and evaluated the patient. I have discussed the patient with the resident/non-physician practitioner and agree with the resident's/non-physician practitioner's findings, Plan of Care, and MDM as documented in the resident's/non-physician practitioner's note, except where noted. All available labs and Radiology studies were reviewed.  I was present for key portions of any procedure(s) performed by the resident/non-physician practitioner and I was immediately available to provide assistance.       At this point I agree with the current assessment done in the Emergency Department.  I have conducted an independent evaluation of this patient a history and physical is as follows:    62 YO female presents 2 days after a fall. States she did not lose consciousness. She has had worsening dizziness and some neck pain. She has pain in the lumbar, thoracic and cervical spine. Patient states she has a history of ventral hernia repair and felt some tearing in the abdomen at the site during the fall, she continues to have some pain.    Gen: Pt is in NAD  HEENT: Head is atraumatic, EOM's intact, neck has FROM  Chest: CTAB, non-tender  Heart: RRR  Abdomen: Soft, NT/ND  Musculoskeletal: FROM in all extremities, tenderness in the neck.  Skin: No rash, no ecchymosis  Neuro: Awake, alert, oriented x4; Cranial nerves II-XII intact  Psych: Normal affect    MDM -  Will CT c-spine, Abdomen and pelvis.    ED Course         Critical Care Time  Procedures

## 2024-02-19 NOTE — ED PROVIDER NOTES
History  Chief Complaint   Patient presents with    Head Injury     Pt reports slipping on ice on Saturday night and fell backwards. + head strike, - LOC, - thinners. Pt reports dizziness, abdominal aching, and slight headache. Pt took Advil for symptoms.     HPI  63-year-old female comes in after fall 2 days ago.  Patient did not lose consciousness.  The patient is not on any blood thinners.  She did hit her head.  Patient states that the past 2 days she has had some mild dizziness as well as pain in her cervical lumbar and thoracic spine.  Patient also has a history of a abdominal hernia which she states has been causing her some extra pain following this fall.  The patient does not have any altered mental status or motor or sensory deficit.  The patient's proprioception is intact  Prior to Admission Medications   Prescriptions Last Dose Informant Patient Reported? Taking?   Cetirizine HCl 10 MG CAPS  Self Yes No   Sig: Take 1 tablet by mouth daily at bedtime    Cholecalciferol (Vitamin D3) 25 MCG (1000 UT) CAPS  Self No No   Sig: Take 1 capsule (1,000 Units total) by mouth daily   Melatonin 300 MCG TABS  Self Yes No   Sig: Take by mouth   Multiple Vitamin (MULTIVITAMIN ADULT PO)  Self Yes No   Sig: Take by mouth in the morning   Vitamin D, Cholecalciferol, 50 MCG (2000 UT) CAPS  Self Yes No   Sig: Take by mouth   estradiol (MENOSTAR) 14 MCG/24HR  Self Yes No   Sig: PLACE 1 PATCH ON THE SKIN ONCE WEEKLY   fexofenadine-pseudoephedrine (Allegra-D Allergy & Congestion)  MG per tablet  Self Yes No   Sig: Take by mouth   fluticasone (FLONASE) 50 mcg/act nasal spray  Self Yes No   Si sprays into each nostril daily   pantoprazole (PROTONIX) 20 mg tablet  Self No No   Sig: take 1 tablet by mouth once daily with breakfast      Facility-Administered Medications: None       Past Medical History:   Diagnosis Date    Allergic rhinitis     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    Have  issues with eating rice and other foods    Colon polyp     Diabetes mellitus (HCC) TESTING IN MAY 2019    FAMILY HISTORY    Dysphagia     GERD (gastroesophageal reflux disease)     Hernia 2019    Discovered after falling    Irritable bowel syndrome 2/7/1999    Had a lot of stress at the time    Vertigo     LAST ASSESSED 3/8/13; RESOLVED 11/10/15       Past Surgical History:   Procedure Laterality Date    ABDOMINAL SURGERY      BLADDER SUSPENSION      BUNIONECTOMY Right     with implants    COLONOSCOPY      Approx 10 years sfo Dr Ansari    EGD      FLEXIBLE SIGMOIDOSCOPY  1999    Disgnosed with IBS    HERNIA REPAIR  2021    Had hernia repaired    TOTAL ABDOMINAL HYSTERECTOMY  2006    WITH REMOVAL OF BOTH OVARIES     UPPER GASTROINTESTINAL ENDOSCOPY      Approx 10 yrs ago Dr Ansari.  Also had esophagus stretched       Family History   Problem Relation Age of Onset    Other Mother         MULTIPLE MYELOMA    Cancer Mother         Myeloma for 20 yrs    Diabetes Father     Melanoma Father     Prostate cancer Father     Diabetes Brother         Diabetes    Hypertension Brother     Diabetes Brother         Diabetes     I have reviewed and agree with the history as documented.    E-Cigarette/Vaping    E-Cigarette Use Never User      E-Cigarette/Vaping Substances    Nicotine No     THC No     CBD No     Flavoring No     Other No     Unknown No      Social History     Tobacco Use    Smoking status: Never    Smokeless tobacco: Never   Vaping Use    Vaping status: Never Used   Substance Use Topics    Alcohol use: Yes     Comment: occasional    Drug use: No        Review of Systems    Physical Exam  ED Triage Vitals   Temperature Pulse Respirations Blood Pressure SpO2   02/19/24 1253 02/19/24 1251 02/19/24 1251 02/19/24 1251 02/19/24 1251   97.5 °F (36.4 °C) 74 18 144/81 97 %      Temp Source Heart Rate Source Patient Position - Orthostatic VS BP Location FiO2 (%)   02/19/24 1253 02/19/24 1251 02/19/24 1251 02/19/24 1251 --    Oral Monitor Sitting Right arm       Pain Score       02/19/24 1439       3             Orthostatic Vital Signs  Vitals:    02/19/24 1251 02/19/24 1529   BP: 144/81 131/74   Pulse: 74 64   Patient Position - Orthostatic VS: Sitting Lying       Physical Exam    ED Medications  Medications   ondansetron (ZOFRAN) injection 4 mg (4 mg Intravenous Given 2/19/24 1438)   ketorolac (TORADOL) injection 15 mg (15 mg Intravenous Given 2/19/24 1439)   sodium chloride 0.9 % bolus 1,000 mL (0 mL Intravenous Stopped 2/19/24 1600)       Diagnostic Studies  Results Reviewed       Procedure Component Value Units Date/Time    Comprehensive metabolic panel [[id number]] Collected: 02/19/24 1434    Lab Status: Final result Specimen: Blood from Arm, Right Updated: 02/19/24 1527     Sodium 139 mmol/L      Potassium 4.5 mmol/L      Chloride 104 mmol/L      CO2 29 mmol/L      ANION GAP 6 mmol/L      BUN 14 mg/dL      Creatinine 0.80 mg/dL      Glucose 93 mg/dL      Calcium 9.2 mg/dL      AST 23 U/L      ALT 13 U/L      Alkaline Phosphatase 70 U/L      Total Protein 6.9 g/dL      Albumin 4.4 g/dL      Total Bilirubin 0.45 mg/dL      eGFR 79 ml/min/1.73sq m     Narrative:      National Kidney Disease Foundation guidelines for Chronic Kidney Disease (CKD):     Stage 1 with normal or high GFR (GFR > 90 mL/min/1.73 square meters)    Stage 2 Mild CKD (GFR = 60-89 mL/min/1.73 square meters)    Stage 3A Moderate CKD (GFR = 45-59 mL/min/1.73 square meters)    Stage 3B Moderate CKD (GFR = 30-44 mL/min/1.73 square meters)    Stage 4 Severe CKD (GFR = 15-29 mL/min/1.73 square meters)    Stage 5 End Stage CKD (GFR <15 mL/min/1.73 square meters)  Note: GFR calculation is accurate only with a steady state creatinine    CBC and differential [[id number]] Collected: 02/19/24 1434    Lab Status: Final result Specimen: Blood from Arm, Right Updated: 02/19/24 1449     WBC 5.64 Thousand/uL      RBC 4.32 Million/uL      Hemoglobin 13.4 g/dL      Hematocrit 40.4 %       MCV 94 fL      MCH 31.0 pg      MCHC 33.2 g/dL      RDW 12.7 %      MPV 9.4 fL      Platelets 222 Thousands/uL      nRBC 0 /100 WBCs      Neutrophils Relative 53 %      Immat GRANS % 0 %      Lymphocytes Relative 32 %      Monocytes Relative 8 %      Eosinophils Relative 6 %      Basophils Relative 1 %      Neutrophils Absolute 3.02 Thousands/µL      Immature Grans Absolute 0.01 Thousand/uL      Lymphocytes Absolute 1.80 Thousands/µL      Monocytes Absolute 0.44 Thousand/µL      Eosinophils Absolute 0.32 Thousand/µL      Basophils Absolute 0.05 Thousands/µL                    CT cervical spine without contrast   Final Result by Margarito Ivey MD (02/19 1632)      No acute cervical spine fracture or traumatic malalignment.                  Workstation performed: [id number]         CT abdomen pelvis wo contrast   Final Result by Margarito Ivey MD (02/19 1637)      Findings compatible with constipation. No evidence of bowel obstruction, colitis or diverticulitis.      Workstation performed: [id number]               Procedures  Procedures      ED Course       Findings compatible with constipation. No evidence of bowel obstruction, colitis or diverticulitis.       No acute cervical spine fracture or traumatic malalignment.                 SBIRT 20yo+      Flowsheet Row Most Recent Value   Initial Alcohol Screen: US AUDIT-C     1. How often do you have a drink containing alcohol? 1 Filed at: 02/19/2024 1252   2. How many drinks containing alcohol do you have on a typical day you are drinking?  0 Filed at: 02/19/2024 1252   3b. FEMALE Any Age, or MALE 65+: How often do you have 4 or more drinks on one occassion? 0 Filed at: 02/19/2024 1252   Audit-C Score 1 Filed at: 02/19/2024 1252   EDWARD: How many times in the past year have you...    Used an illegal drug or used a prescription medication for non-medical reasons? Never Filed at: 02/19/2024 1252                  Medical Decision Making  Amount and/or  Complexity of Data Reviewed  Labs: ordered.  Radiology: ordered.    Risk  Prescription drug management.    63-year-old female with history of a fall on Saturday where she hit her head.  Patient did not lose consciousness.  The patient is not on any blood thinners.  The patient did not coming to the hospital following her fall.  She states that since her fall she has had cervical pain that is worse when she moves her head.  Patient does not have any motor or sensory deficit.  Patient's cranial nerves II through XII are intact.  Patient's pupils are equal reactive to light.  Extraocular motions are intact.  Patient had soft and nontender.  Patient does not have any abnormalities on auscultation of her heart or lungs.  Due to the patient's injury, a CT of her cervical spine will be done looking for signs of fracture from the injury.  Patient also had a CT abdomen and pelvis done without contrast looking for signs of hernial damage.  The results of the patient's examinations were normal without any signs acute trauma or intra-abdominal pathology.  The patient does have noted constipation.  The patient was medically cleared and discharged home with recommendation to follow-up with the [facility]      Disposition  Final diagnoses:   Concussion   Constipation     Time reflects when diagnosis was documented in both MDM as applicable and the Disposition within this note       Time User Action Codes Description Comment    2/19/2024  3:49 PM Eliazar Cox Add [S06.0XAA] Concussion     2/19/2024  4:53 PM Grzegorz Crain Add [K59.00] Constipation           ED Disposition       ED Disposition   Discharge    Condition   Stable    Date/Time   Mon Feb 19, 2024 1653    Comment   Karen Goins discharge to home/self care.                   Follow-up Information       Follow up With Specialties Details Why Contact Info    [employer]    [phone]              Discharge Medication List as of 2/19/2024  4:54 PM         CONTINUE these medications which have NOT CHANGED    Details   Cetirizine HCl 10 MG CAPS Take 1 tablet by mouth daily at bedtime , Starting Thu 3/7/2013, Historical Med      !! Cholecalciferol (Vitamin D3) 25 MCG (1000 UT) CAPS Take 1 capsule (1,000 Units total) by mouth daily, Starting Sun 3/13/2022, No Print      estradiol (MENOSTAR) 14 MCG/24HR PLACE 1 PATCH ON THE SKIN ONCE WEEKLY, Historical Med      fexofenadine-pseudoephedrine (Allegra-D Allergy & Congestion)  MG per tablet Take by mouth, Historical Med      fluticasone (FLONASE) 50 mcg/act nasal spray 2 sprays into each nostril daily, Starting Mon 3/12/2012, Historical Med      Melatonin 300 MCG TABS Take by mouth, Historical Med      Multiple Vitamin (MULTIVITAMIN ADULT PO) Take by mouth in the morning, Historical Med      pantoprazole (PROTONIX) 20 mg tablet take 1 tablet by mouth once daily with breakfast, Starting Thu 10/26/2023, Normal      !! Vitamin D, Cholecalciferol, 50 MCG (2000 UT) CAPS Take by mouth, Historical Med       !! - Potential duplicate medications found. Please discuss with provider.            PDMP Review         Value Time User    PDMP Reviewed  Yes 12/17/2019 10:17 AM Anais Alaniz MD             ED Provider  Attending physically available and evaluated Karen GUALBERTO Goins. I managed the patient along with the ED Attending.    Electronically Signed by           Eliazar Cox MD  02/20/24 0633

## 2024-02-19 NOTE — CARE ANYWHERE EVISITS
Visit Summary for CELINA ARROYO - Gender: Female - Date of Birth: 1960  Date: [id number] - Duration: 4 minutes  Patient: CELINA ARROYO  Provider: Kenneth MORA    Patient Contact Information  Address  [address] [place] [address]; [place] [address]      Visit Topics    Triage Questions   What is your current physical address in the event of a medical emergency? Answer []  Are you allergic to any medications? Answer []  Are you now or could you be pregnant? Answer []  Do you have any immune system compromise or chronic lung   disease? Answer []  Do you have any vulnerable family members in the home (infant, pregnant, cancer, elderly)? Answer []     Conversation Transcripts  [0A][0A] [Notification] You are connected with Kenneth MORA, Urgent Care Specialist.[0A][Notification] CELINA ARROYO is located in [place].[0A][Notification] CELINA ARROYO has shared health history...[0A]    Diagnosis  Acute upper respiratory infection, unspecified    Procedures  Value: [id number] Code: CPT-4 UNLISTED E&M SERVICE    Medications Prescribed    No prescriptions ordered    Electronically signed by: Kenneth Black(NPI [id number])

## 2024-02-19 NOTE — TELEPHONE ENCOUNTER
"Patient fell and hit the back of her head on 2/17/24. She had a grapefruit sized lump on the back of her head that is now the size of a golf ball with mild to moderate pain and dizziness in the am. Patient advised to go to the ED and is not sure what time she will go since she is working from home and her  will be taking her.     Reason for Disposition   Large swelling or bruise (> 2 inches or 5 cm)    Answer Assessment - Initial Assessment Questions  1. MECHANISM: \"How did the injury happen?\" For falls, ask: \"What height did you fall from?\" and \"What surface did you fall against?\"       Slipped on ice   2. ONSET: \"When did the injury happen?\" (Minutes or hours ago)       2/17/24  3. NEUROLOGIC SYMPTOMS: \"Was there any loss of consciousness?\" \"Are there any other neurological symptoms?\"       Denies   4. MENTAL STATUS: \"Does the person know who he is, who you are, and where he is?\"       Yes  5. LOCATION: \"What part of the head was hit?\"       Back L   6. SCALP APPEARANCE: \"What does the scalp look like? Is it bleeding now?\" If Yes, ask: \"Is it difficult to stop?\"       Lump   7. SIZE: For cuts, bruises, or swelling, ask: \"How large is it?\" (e.g., inches or centimeters)       Golf ball sized (was grapefruit sized)  8. PAIN: \"Is there any pain?\" If Yes, ask: \"How bad is it?\"  (e.g., Scale 1-10; or mild, moderate, severe)      Mild-moderate   9. TETANUS: For any breaks in the skin, ask: \"When was the last tetanus booster?\"      N/A   10. OTHER SYMPTOMS: \"Do you have any other symptoms?\" (e.g., neck pain, vomiting)        Dizziness in the am   11. PREGNANCY: \"Is there any chance you are pregnant?\" \"When was your last menstrual period?\"        N/A    Protocols used: Head Injury-ADULT-[place]    "

## 2024-02-19 NOTE — DISCHARGE INSTRUCTIONS
You were seen today and worked up for a head trauma without loss of consciousness. On exam you did not exhibit any focal neurological deficit. All labs and imaging were normal for any acute pathology. Please follow up with the comprehensive concussion center for further evaluation and treatment plan with recovery from this fall/ head strike

## 2024-02-19 NOTE — PATIENT INSTRUCTIONS
As we discussed, my recommendation will be the same as your PCP and go to ER for evaluation.  You were agreeable and will go to [facility].  You will have your  drive you.   
English

## 2024-02-22 ENCOUNTER — EVALUATION (OUTPATIENT)
Dept: PHYSICAL THERAPY | Facility: CLINIC | Age: 64
End: 2024-02-22
Payer: COMMERCIAL

## 2024-02-22 DIAGNOSIS — R42 DIZZINESS: Primary | ICD-10-CM

## 2024-02-22 DIAGNOSIS — S06.0XAA CONCUSSION: ICD-10-CM

## 2024-02-22 PROCEDURE — 97112 NEUROMUSCULAR REEDUCATION: CPT | Performed by: PHYSICAL THERAPIST

## 2024-02-22 PROCEDURE — 97162 PT EVAL MOD COMPLEX 30 MIN: CPT | Performed by: PHYSICAL THERAPIST

## 2024-02-22 NOTE — PROGRESS NOTES
PT Evaluation     Today's date: 2024  Patient name: Karen Goins  : 1960  MRN: [id number]  Referring provider: Man Mathew MD  Dx:   Encounter Diagnosis     ICD-10-CM    1. Dizziness  R42                      Assessment  Assessment details: 63 year old female patient reports to PT with concussion and continued symptoms of dizziness but improving. No red flags noted and patient denies numbness/tingling. Patient presents with no cervical or BPPV involvement related to her symptoms. Vestibular and ocular/oculomotor testing not completed because her concussion started less than 2 weeks ago. Patient will benefit from skilled PT services to address current impairments and functional limitations to help patient return to her PLOF.       Impairments: activity intolerance and impaired balance    Symptom irritability: lowUnderstanding of Dx/Px/POC: good   Prognosis: good    Goals  STG  1. Patient will be independent with completion of HEP throughout therapy  2. Patient will get up in the morning without dizziness in 1 week.  LTG  1. Patient will stand up quickly without increase in dizziness in 1.5 weeks  2. Patient will reach predicted FOTO score at end of treatment sessions    Plan  Patient would benefit from: skilled physical therapy        Subjective Evaluation    History of Present Illness  Mechanism of injury: Patients reports with concussion that she got 5 days ago () when she slipped on black ice. Patient notes the next day she wasn't herself and her pain started with body aches. Patient had imaging which showed no fractures. Patient notes her symptoms are worse in the mornings and as she gets around and moves she feels better. Patient notes her biggest issue right now is vertigo which is worse in the morning. Patient notes she has some hard time getting up to walk in the morning from lying down to getting out of bed. Patient notes she also has difficulty getting up quickly and is worse in the  morning. Patient denies numbness/tingling. Patient notes headaches if she does too much but hasn't had any today. Patient notes improving sleep as in the beginning she wasn't sleeping well.     Patient denies issues driving but hasn't gone on the highway or driven a lot. Patient denies issues reading or completing her work.   Patient Goals  Patient goals for therapy: improved balance    Treatments  Current treatment: physical therapy        Objective     Concurrent Complaints  Positive for headaches and memory loss (harder time with recall of words, short term, had it prior to concussion but worse since). Negative for nausea/motion sickness (initially did), tinnitus, visual change, hearing loss, aural fullness, poor concentration and peripheral neuropathy  Neuro Exam:     Dizziness  Positive for disequilibrium, vertigo and light-headedness.   Negative for oscillopsia, motion sickness, rocking or swaying, diplopia and floating or swimming.     Exacerbating factors  Positive for bending over (when does it quickly), looking up (slightly) and supine to/from sitting.   Negative for rolling in bed, walking, turning head and optokinetic movement. Walking in busy environment: N/A.    Symptoms   Duration: seconds  Frequency: depeonds on what movement  Intensity at best: 0/10  Intensity at worst: 3/10    Headaches   Patient reports headaches: Yes.   Frequency: depends on activity    Cervical exam   Ligament Laxity Testing   Alar ligament: WNL  Sharp Sebastian: WNL  Modified VBI   Left: asymptomatic  Right: asymptomatic    Positional testing   Darius-Hallpike   Left posterior canal: WNL  Right posterior canal: WNL  Roll test   Left horizontal canal: WNL  Right horizontal canal: WNL             Precautions: none       Manuals 2/22                                                                Neuro Re-Ed             Concussion education r                                                                                          Ther Ex                                                                                                                      Ther Activity                                       Gait Training                                       Modalities

## 2024-04-03 ENCOUNTER — OFFICE VISIT (OUTPATIENT)
Dept: FAMILY MEDICINE CLINIC | Facility: CLINIC | Age: 64
End: 2024-04-03
Payer: COMMERCIAL

## 2024-04-03 VITALS
BODY MASS INDEX: 26.56 KG/M2 | SYSTOLIC BLOOD PRESSURE: 130 MMHG | DIASTOLIC BLOOD PRESSURE: 80 MMHG | TEMPERATURE: 97.1 F | RESPIRATION RATE: 16 BRPM | OXYGEN SATURATION: 99 % | WEIGHT: 169.6 LBS | HEART RATE: 85 BPM

## 2024-04-03 DIAGNOSIS — J01.00 ACUTE NON-RECURRENT MAXILLARY SINUSITIS: Primary | ICD-10-CM

## 2024-04-03 PROCEDURE — 99214 OFFICE O/P EST MOD 30 MIN: CPT | Performed by: FAMILY MEDICINE

## 2024-04-03 RX ORDER — AMOXICILLIN 875 MG/1
875 TABLET, COATED ORAL 2 TIMES DAILY
Qty: 14 TABLET | Refills: 0 | Status: SHIPPED | OUTPATIENT
Start: 2024-04-03 | End: 2024-04-10

## 2024-04-03 RX ORDER — BENZONATATE 100 MG/1
100 CAPSULE ORAL 3 TIMES DAILY PRN
Qty: 20 CAPSULE | Refills: 0 | Status: SHIPPED | OUTPATIENT
Start: 2024-04-03

## 2024-04-03 NOTE — PROGRESS NOTES
FAMILY PRACTICE OFFICE VISIT    NAME: Karen Goins    AGE: 64 y.o.   SEX: female  : 1960   MRN: [id number]    DATE: 4/3/2024  TIME: 1:17 PM    Assessment and Plan   1. Acute non-recurrent maxillary sinusitis  Patient Instructions   Can use afrin nasal spray - 1 spray into each nostril at bedtime for up to 6 days   Flonase - 2x/day    Tessalon perles  Robitussin DM as needed.    Antibiotic- take with food    Eat a yogurt while taking antibiotic    Recommendation to increase fluids - particularly water, rest, saline nasal spray and humidified air      - amoxicillin (AMOXIL) 875 mg tablet; Take 1 tablet (875 mg total) by mouth 2 (two) times a day for 7 days Eat a yogurt while taking  Dispense: 14 tablet; Refill: 0  - benzonatate (TESSALON PERLES) 100 mg capsule; Take 1 capsule (100 mg total) by mouth 3 (three) times a day as needed for cough  Dispense: 20 capsule; Refill: 0        Chief Complaint     Chief Complaint   Patient presents with    Cough     Last Thursday started with cough tested for Covid yesterday which was neg. Post nasal drip, sore throat and green/yellow mucus, headaches, swollen eyes, burning eyes/painful, sneezing as well. Has dry and productive cough. Pt does have allergies and took OTC meds with no help. Also mentioned no taste or smell        History of Present Illness   Kraen Goins is a 64 y.o.-year-old female who presents today due to not feeling well X 7 days  Thought initially she had allergies but now feels sinus congestion and coughing.    Tried nedi pot  Sudafed  Mucinex.    Symptoms not improving since onset    Answers submitted by the patient for this visit:  Cough Questionnaire (Submitted on 4/3/2024)  Chief Complaint: Cough  Chronicity: new  Onset: in the past 7 days  Progression since onset: rapidly worsening  Frequency: hourly  Cough characteristics: productive of purulent sputum  ear congestion: Yes  heartburn: Yes  hemoptysis: No  nasal congestion: Yes  sweats:  No  weight loss: No  Aggravated by: nothing      Review of Systems   Review of Systems   Constitutional:  Negative for chills and fever.        No body aches  Took home covid tests X 3 and all (-)     HENT:  Positive for postnasal drip, rhinorrhea, sinus pressure, sinus pain and sore throat. Negative for ear pain.    Respiratory:  Positive for cough. Negative for shortness of breath and wheezing.    Cardiovascular:  Negative for chest pain.   Musculoskeletal:  Negative for myalgias.   Skin:  Negative for rash.   Neurological:  Positive for headaches.   Psychiatric/Behavioral:          Unable to sleep due to congestion and coughing.         Active Problem List     Patient Active Problem List   Diagnosis    Allergic rhinitis    Closed fracture of right inferior pubic ramus (HCC)    Closed fracture of right superior pubic ramus (HCC)    History of pelvic surgery    Dermatitis    Bilateral hearing loss    Lower abdominal pain    COVID-19 virus infection in vaccinated adult    Esophageal stricture    Mixed hyperlipidemia    Urinary incontinence         Past Medical History:  Past Medical History:   Diagnosis Date    Allergic rhinitis     Gets immunotherapy with Dr. Carolina    Sheehan esophagus A long time ago    Have issues with eating rice and other foods    Colon polyp     Diabetes mellitus (HCC) TESTING IN MAY 2019    FAMILY HISTORY    Dysphagia     GERD (gastroesophageal reflux disease)     Hernia 2019    Discovered after falling    Irritable bowel syndrome 2/7/1999    Had a lot of stress at the time    Vertigo     LAST ASSESSED 3/8/13; RESOLVED 11/10/15       Past Surgical History:  Past Surgical History:   Procedure Laterality Date    ABDOMINAL SURGERY      BLADDER SUSPENSION      BUNIONECTOMY Right     with implants    COLONOSCOPY      Approx 10 years sfo Dr Ansari    EGD      FLEXIBLE SIGMOIDOSCOPY  1999    Disgnosed with IBS    HERNIA REPAIR  2021    Had hernia repaired    TOTAL ABDOMINAL HYSTERECTOMY  2006     WITH REMOVAL OF BOTH OVARIES     UPPER GASTROINTESTINAL ENDOSCOPY      Approx 10 yrs ago Dr Ansari.  Also had esophagus stretched       Family History:  Family History   Problem Relation Age of Onset    Other Mother         MULTIPLE MYELOMA    Cancer Mother         Myeloma for 20 yrs    Diabetes Father     Melanoma Father     Prostate cancer Father     Diabetes Brother         Diabetes    Hypertension Brother     Diabetes Brother         Diabetes       Social History:  Social History     Socioeconomic History    Marital status: /Civil Union     Spouse name: Not on file    Number of children: Not on file    Years of education: Not on file    Highest education level: Not on file   Occupational History    Not on file   Tobacco Use    Smoking status: Never    Smokeless tobacco: Never   Vaping Use    Vaping status: Never Used   Substance and Sexual Activity    Alcohol use: Yes     Comment: occasional    Drug use: No    Sexual activity: Not Currently     Partners: Male     Birth control/protection: Surgical     Comment: Hysterectomy   Other Topics Concern    Not on file   Social History Narrative    Daily caffeine use- 2 cups of iced tea, 2-3 cups of hot tea     Social Determinants of Health     Financial Resource Strain: Not on file   Food Insecurity: Not on file   Transportation Needs: Not on file   Physical Activity: Not on file   Stress: Not on file   Social Connections: Not on file   Intimate Partner Violence: Not on file   Housing Stability: Not on file       Objective     Vitals:    04/03/24 1312   BP: 130/80   Pulse: 85   Resp: 16   Temp: (!) 97.1 °F (36.2 °C)   SpO2: 99%     Wt Readings from Last 3 Encounters:   04/03/24 76.9 kg (169 lb 9.6 oz)   02/19/24 74.3 kg (163 lb 12.8 oz)   12/06/23 72.8 kg (160 lb 6.4 oz)       Physical Exam  Vitals and nursing note reviewed.   Constitutional:       General: She is not in acute distress.     Appearance: She is not toxic-appearing.   HENT:      Right Ear: Tympanic  membrane normal.      Ears:      Comments: Tm retraction on left  With slightly dull light reflex       Nose: Nose normal. No congestion.      Comments: Sounds congested.       Mouth/Throat:      Mouth: Mucous membranes are moist.      Pharynx: No oropharyngeal exudate.      Comments: Pressure over maxillary and frontal sinuses to palpation    Post-nasal drip noted  Mucous membranes moist and pink        Eyes:      General: No scleral icterus.     Pupils: Pupils are equal, round, and reactive to light.   Cardiovascular:      Rate and Rhythm: Normal rate and regular rhythm.      Pulses: Normal pulses.      Heart sounds: Normal heart sounds. No murmur heard.  Pulmonary:      Effort: Pulmonary effort is normal. No respiratory distress.      Breath sounds: Normal breath sounds. No wheezing, rhonchi or rales.   Musculoskeletal:      Cervical back: Neck supple. No tenderness.   Psychiatric:         Mood and Affect: Mood normal.         Behavior: Behavior normal.         Thought Content: Thought content normal.         Judgment: Judgment normal.         Pertinent Laboratory/Diagnostic Studies:  Lab Results   Component Value Date    GLUCOSE 88 11/10/2015    BUN 14 02/19/2024    CREATININE 0.80 02/19/2024    CALCIUM 9.2 02/19/2024     11/10/2015    K 4.5 02/19/2024    CO2 29 02/19/2024     02/19/2024     Lab Results   Component Value Date    ALT 13 02/19/2024    AST 23 02/19/2024    ALKPHOS 70 02/19/2024    BILITOT 0.37 11/10/2015       Lab Results   Component Value Date    WBC 5.64 02/19/2024    HGB 13.4 02/19/2024    HCT 40.4 02/19/2024    MCV 94 02/19/2024     02/19/2024       Lab Results   Component Value Date    TSH 1.020 03/11/2022       Lab Results   Component Value Date    CHOL 195 11/12/2015     Lab Results   Component Value Date    TRIG 58 10/28/2023     Lab Results   Component Value Date    HDL 71 10/28/2023     Lab Results   Component Value Date    LDLCALC 130 (H) 10/28/2023     Lab Results    Component Value Date    HGBA1C 5.6 03/11/2022       Results for orders placed or performed during the hospital encounter of 02/19/24   CBC and differential   Result Value Ref Range    WBC 5.64 4.31 - 10.16 Thousand/uL    RBC 4.32 3.81 - 5.12 Million/uL    Hemoglobin 13.4 11.5 - 15.4 g/dL    Hematocrit 40.4 34.8 - 46.1 %    MCV 94 82 - 98 fL    MCH 31.0 26.8 - 34.3 pg    MCHC 33.2 31.4 - 37.4 g/dL    RDW 12.7 11.6 - 15.1 %    MPV 9.4 8.9 - 12.7 fL    Platelets 222 149 - 390 Thousands/uL    nRBC 0 /100 WBCs    Neutrophils Relative 53 43 - 75 %    Immature Grans % 0 0 - 2 %    Lymphocytes Relative 32 14 - 44 %    Monocytes Relative 8 4 - 12 %    Eosinophils Relative 6 0 - 6 %    Basophils Relative 1 0 - 1 %    Neutrophils Absolute 3.02 1.85 - 7.62 Thousands/µL    Absolute Immature Grans 0.01 0.00 - 0.20 Thousand/uL    Absolute Lymphocytes 1.80 0.60 - 4.47 Thousands/µL    Absolute Monocytes 0.44 0.17 - 1.22 Thousand/µL    Eosinophils Absolute 0.32 0.00 - 0.61 Thousand/µL    Basophils Absolute 0.05 0.00 - 0.10 Thousands/µL   Comprehensive metabolic panel   Result Value Ref Range    Sodium 139 135 - 147 mmol/L    Potassium 4.5 3.5 - 5.3 mmol/L    Chloride 104 96 - 108 mmol/L    CO2 29 21 - 32 mmol/L    ANION GAP 6 mmol/L    BUN 14 5 - 25 mg/dL    Creatinine 0.80 0.60 - 1.30 mg/dL    Glucose 93 65 - 140 mg/dL    Calcium 9.2 8.4 - 10.2 mg/dL    AST 23 13 - 39 U/L    ALT 13 7 - 52 U/L    Alkaline Phosphatase 70 34 - 104 U/L    Total Protein 6.9 6.4 - 8.4 g/dL    Albumin 4.4 3.5 - 5.0 g/dL    Total Bilirubin 0.45 0.20 - 1.00 mg/dL    eGFR 79 ml/min/1.73sq m       No orders of the defined types were placed in this encounter.      ALLERGIES:  Allergies   Allergen Reactions    Sulfa Antibiotics Other (See Comments)     migraines    Sulfasalazine Other (See Comments)     migraines       Current Medications     Current Outpatient Medications   Medication Sig Dispense Refill    Cetirizine HCl 10 MG CAPS Take 1 tablet by mouth  daily at bedtime       estradiol (MENOSTAR) 14 MCG/24HR PLACE 1 PATCH ON THE SKIN ONCE WEEKLY      fexofenadine-pseudoephedrine (Allegra-D Allergy & Congestion)  MG per tablet Take by mouth      fluticasone (FLONASE) 50 mcg/act nasal spray 2 sprays into each nostril daily      Multiple Vitamin (MULTIVITAMIN ADULT PO) Take by mouth in the morning      pantoprazole (PROTONIX) 20 mg tablet take 1 tablet by mouth once daily with breakfast 30 tablet 5    Vitamin D, Cholecalciferol, 50 MCG (2000 UT) CAPS Take by mouth      Cholecalciferol (Vitamin D3) 25 MCG (1000 UT) CAPS Take 1 capsule (1,000 Units total) by mouth daily      Melatonin 300 MCG TABS Take by mouth       No current facility-administered medications for this visit.         Health Maintenance     Health Maintenance   Topic Date Due    Osteoporosis Screening  Never done    Zoster Vaccine (1 of 2) Never done    COVID-19 Vaccine (4 - 2023-24 season) 09/01/2023    Annual Physical  10/20/2023    PT PLAN OF CARE  03/23/2024    Breast Cancer Screening: Mammogram  08/24/2024    Depression Screening  04/03/2025    Colorectal Cancer Screening  01/14/2030    DTaP,Tdap,and Td Vaccines (3 - Td or Tdap) 09/15/2030    HIV Screening  Completed    Hepatitis C Screening  Completed    Influenza Vaccine  Completed    Pneumococcal Vaccine: Pediatrics (0 to 5 Years) and At-Risk Patients (6 to 64 Years)  Aged Out    HIB Vaccine  Aged Out    IPV Vaccine  Aged Out    Hepatitis A Vaccine  Aged Out    Meningococcal ACWY Vaccine  Aged Out    HPV Vaccine  Aged Out     Immunization History   Administered Date(s) Administered    COVID-19 MODERNA VACC 0.5 ML IM 01/12/2021, 02/09/2021, 04/22/2022    INFLUENZA 11/10/2015, 11/05/2016, 08/15/2017, 10/01/2019, 08/18/2020    Influenza Quadrivalent, 6-35 Months IM 11/10/2015    Influenza, injectable, quadrivalent, preservative free 0.5 mL 08/18/2020, 10/25/2023    Influenza, recombinant, quadrivalent,injectable, preservative free 09/06/2018,  10/20/2021, 10/20/2022    Influenza, seasonal, injectable 03/15/2011, 09/14/2011    Tdap 03/15/2011, 09/15/2020          Jaqueline Trevino DO

## 2024-04-03 NOTE — PATIENT INSTRUCTIONS
Can use afrin nasal spray - 1 spray into each nostril at bedtime for up to 6 days   Flonase - 2x/day    Tessalon perles  Robitussin DM as needed.    Antibiotic- take with food    Eat a yogurt while taking antibiotic    Recommendation to increase fluids - particularly water, rest, saline nasal spray and humidified air

## 2024-05-06 ENCOUNTER — HOSPITAL ENCOUNTER (OUTPATIENT)
Dept: BONE DENSITY | Facility: MEDICAL CENTER | Age: 64
Discharge: HOME | End: 2024-05-06
Payer: COMMERCIAL

## 2024-05-06 DIAGNOSIS — Z78.0 POST-MENOPAUSAL: ICD-10-CM

## 2024-05-06 PROCEDURE — 77080 DXA BONE DENSITY AXIAL: CPT

## 2024-05-11 DIAGNOSIS — J01.00 ACUTE NON-RECURRENT MAXILLARY SINUSITIS: ICD-10-CM

## 2024-05-13 ENCOUNTER — AMB VIDEO VISIT (OUTPATIENT)
Dept: OTHER | Facility: HOSPITAL | Age: 64
End: 2024-05-13

## 2024-05-13 VITALS — TEMPERATURE: 99 F

## 2024-05-13 DIAGNOSIS — M81.0 AGE-RELATED OSTEOPOROSIS WITHOUT CURRENT PATHOLOGICAL FRACTURE: Primary | ICD-10-CM

## 2024-05-13 DIAGNOSIS — J01.90 ACUTE SINUSITIS, RECURRENCE NOT SPECIFIED, UNSPECIFIED LOCATION: Primary | ICD-10-CM

## 2024-05-13 RX ORDER — DOXYCYCLINE HYCLATE 100 MG/1
100 CAPSULE ORAL EVERY 12 HOURS SCHEDULED
Qty: 14 CAPSULE | Refills: 0 | Status: SHIPPED | OUTPATIENT
Start: 2024-05-13 | End: 2024-05-20

## 2024-05-13 NOTE — PROGRESS NOTES
Required Documentation:  Encounter provider: MORGAN Em    Identify all parties in room with patient during virtual visit:  No one else    The patient was identified by name and date of birth. Karen Goins was informed that this is a telemedicine visit and that the visit is being conducted through the [employer] platform. She agrees to proceed..  My office door was closed. No one else was in the room.  She acknowledged consent and understanding of privacy and security of the video platform. The patient has agreed to participate and understands they can discontinue the visit at any time.    Verification of patient location:  Patient is located at Home in the following state in which I hold an active license [place]    Patient is aware this is a billable service.     Reason for visit is No chief complaint on file.       Subjective  This is a 64 year old female here today for video visit.  She states she was treated for sinus infection 1 month ago.  She states she was better and then developed a cough.  No chest pain.  She states she was treated with high dose amoxicillin.  She states symptoms now returned. No fever.  She is having increased sinus pressure for the last 5  days.            Past Medical History:   Diagnosis Date    Allergic rhinitis     Gets immunotherapy with Dr. Carolina    Sheehan esophagus A long time ago    Have issues with eating rice and other foods    Colon polyp     Diabetes mellitus (HCC) TESTING IN MAY 2019    FAMILY HISTORY    Dysphagia     GERD (gastroesophageal reflux disease)     Hernia 2019    Discovered after falling    Irritable bowel syndrome 2/7/1999    Had a lot of stress at the time    Vertigo     LAST ASSESSED 3/8/13; RESOLVED 11/10/15       Past Surgical History:   Procedure Laterality Date    ABDOMINAL SURGERY      BLADDER SUSPENSION      BUNIONECTOMY Right     with implants    COLONOSCOPY      Approx 10 years sfo Dr Ansari    EGD      FLEXIBLE SIGMOIDOSCOPY   1999    Disgnosed with IBS    HERNIA REPAIR  2021    Had hernia repaired    TOTAL ABDOMINAL HYSTERECTOMY  2006    WITH REMOVAL OF BOTH OVARIES     UPPER GASTROINTESTINAL ENDOSCOPY      Approx 10 yrs ago Dr Ansari.  Also had esophagus stretched        Allergies   Allergen Reactions    Sulfa Antibiotics Other (See Comments)     migraines    Sulfasalazine Other (See Comments)     migraines       Review of Systems   Constitutional:  Positive for fatigue. Negative for activity change and chills.   HENT:  Positive for congestion and rhinorrhea.    Respiratory:  Negative for cough.    Cardiovascular: Negative.    Neurological: Negative.    Psychiatric/Behavioral: Negative.         Video Exam    Vitals:    05/13/24 1334   Temp: 99 °F (37.2 °C)       Physical Exam  Constitutional:       General: She is not in acute distress.     Appearance: Normal appearance. She is not ill-appearing or toxic-appearing.   HENT:      Nose: Congestion present.   Neurological:      Mental Status: She is alert and oriented to person, place, and time.   Psychiatric:         Mood and Affect: Mood normal.         Behavior: Behavior normal.         Thought Content: Thought content normal.         Judgment: Judgment normal.         Visit Time  Total Visit Duration: 8 minutes    Assessment/Plan:    Diagnoses and all orders for this visit:    Acute sinusitis, recurrence not specified, unspecified location  -     doxycycline hyclate (VIBRAMYCIN) 100 mg capsule; Take 1 capsule (100 mg total) by mouth every 12 (twelve) hours for 7 days        Patient Instructions   Start antibiotic.  Take probiotic.  OTC cough and cold as needed.  Tylenol or motrin as needed.  Follow up with PCP if no improvement.  Go to ER with any worsening symtpoms.

## 2024-05-13 NOTE — CARE ANYWHERE EVISITS
Visit Summary for CELINA ARROYO - Gender: Female - Date of Birth: 1960  Date: [id number] - Duration: 7 minutes  Patient: CELINA ZAINA DINA  Provider: Kenneth MORA    Patient Contact Information  Address  [address] [place] [id number]      Visit Topics  Cold [Added By: Self - 2024-05-13]  Earache [Added By: Self - 2024-05-13]  Fever [Added By: Self - 2024-05-13]    Triage Questions   What is your current physical address in the event of a medical emergency? Answer []  Are you allergic to any medications? Answer []  Are you now or could you be pregnant? Answer []  Do you have any immune system compromise or chronic lung   disease? Answer []  Do you have any vulnerable family members in the home (infant, pregnant, cancer, elderly)? Answer []     Conversation Transcripts  [0A][0A] [Notification] You are connected with Kenneth MORA, Urgent Care Specialist.[0A][Notification] CELINA ARROYO is located in [place].[0A][Notification] CELINA ARROYO has shared health history...[0A]    Diagnosis  Acute pansinusitis    Procedures  Value: [id number] Code: CPT-4 UNLISTED E&M SERVICE    Medications Prescribed    No prescriptions ordered    Electronically signed by: Kenneth Black(NPI [id number])

## 2024-05-13 NOTE — PATIENT INSTRUCTIONS
Start antibiotic.  Take probiotic.  OTC cough and cold as needed.  Tylenol or motrin as needed.  Follow up with PCP if no improvement.  Go to ER with any worsening symtpoms.

## 2024-05-14 RX ORDER — BENZONATATE 100 MG/1
100 CAPSULE ORAL 3 TIMES DAILY PRN
Qty: 20 CAPSULE | Refills: 0 | Status: SHIPPED | OUTPATIENT
Start: 2024-05-14

## 2024-05-22 ENCOUNTER — LAB (OUTPATIENT)
Dept: LAB | Facility: MEDICAL CENTER | Age: 64
End: 2024-05-22
Payer: COMMERCIAL

## 2024-05-22 DIAGNOSIS — M81.0 AGE-RELATED OSTEOPOROSIS WITHOUT CURRENT PATHOLOGICAL FRACTURE: ICD-10-CM

## 2024-05-22 LAB
25(OH)D3 SERPL-MCNC: 39.7 NG/ML (ref 30–100)
CA-I BLD-SCNC: 1.2 MMOL/L (ref 1.12–1.32)
PHOSPHATE SERPL-MCNC: 3.8 MG/DL (ref 2.3–4.1)
PTH-INTACT SERPL-MCNC: 72.5 PG/ML (ref 12–88)

## 2024-05-22 PROCEDURE — 36415 COLL VENOUS BLD VENIPUNCTURE: CPT

## 2024-05-22 PROCEDURE — 82330 ASSAY OF CALCIUM: CPT

## 2024-05-22 PROCEDURE — 84100 ASSAY OF PHOSPHORUS: CPT

## 2024-05-22 PROCEDURE — 82306 VITAMIN D 25 HYDROXY: CPT

## 2024-05-22 PROCEDURE — 83970 ASSAY OF PARATHORMONE: CPT

## 2024-05-22 NOTE — RESULT ENCOUNTER NOTE
ALEXANDRA AMOS  YOUR FOLLOWUP LABS ARE NEGATIVE FOR ANY SECONDARY CAUSES OF OSTEOPOROSIS.  IF INTERESTED IN STARTING ON MEDICATION  PLEASE CALL TO SCHEDULE A VIRTUAL VISIT TO DISCUSS OPTIONS.  AND IF UNABLE TO FIND AN APPT TIME - PLEASE MESSAGE ME AND I WILL FIND SOMETHING  THANKS!  DR RILEY

## 2024-06-29 ENCOUNTER — AMB VIDEO VISIT (OUTPATIENT)
Dept: OTHER | Facility: HOSPITAL | Age: 64
End: 2024-06-29
Payer: COMMERCIAL

## 2024-06-29 DIAGNOSIS — L71.9 ROSACEA, ACNE: Primary | ICD-10-CM

## 2024-06-29 DIAGNOSIS — K20.90 ESOPHAGITIS: ICD-10-CM

## 2024-06-29 PROBLEM — S32.511A CLOSED FRACTURE OF RIGHT SUPERIOR PUBIC RAMUS (HCC): Status: RESOLVED | Noted: 2019-12-08 | Resolved: 2024-06-29

## 2024-06-29 PROBLEM — U07.1 COVID-19 VIRUS INFECTION: Status: RESOLVED | Noted: 2021-11-16 | Resolved: 2024-06-29

## 2024-06-29 PROBLEM — S32.591A CLOSED FRACTURE OF RIGHT INFERIOR PUBIC RAMUS (HCC): Status: RESOLVED | Noted: 2019-12-08 | Resolved: 2024-06-29

## 2024-06-29 PROCEDURE — 99212 OFFICE O/P EST SF 10 MIN: CPT | Performed by: PHYSICIAN ASSISTANT

## 2024-06-29 NOTE — CARE ANYWHERE EVISITS
Visit Summary for CELINA ARROYO - Gender: Female - Date of Birth: 1960  Date: [id number] - Duration: 12 minutes  Patient: CELINA BURCIAGAMary DINA  Provider: Shannon Severino PA-C    Patient Contact Information  Address  [address] [place] [address]; [place] [address]      Visit Topics    Triage Questions   What is your current physical address in the event of a medical emergency? Answer []  Are you allergic to any medications? Answer []  Are you now or could you be pregnant? Answer []  Do you have any immune system compromise or chronic lung   disease? Answer []  Do you have any vulnerable family members in the home (infant, pregnant, cancer, elderly)? Answer []     Conversation Transcripts  [0A][0A] [Notification] You are connected with Shannon Severino PA-C, Urgent Care Specialist.[0A][Notification] CELINA ARROYO is located in [place].[0A][Notification] CELINA ARROYO has shared health history...[0A]    Diagnosis  Acne  Rosacea    Procedures  Value: [id number] Code: CPT-4 UNLISTED E&M SERVICE    Medications Prescribed    No prescriptions ordered    Electronically signed by: Severino PA-C, Shannon(NPI [id number])

## 2024-06-29 NOTE — PROGRESS NOTES
Required Documentation:  Encounter provider: Shannon D Severino, PA-C    Identify all parties in room with patient during virtual visit:  No one else    The patient was identified by name and date of birth. Karen Goins was informed that this is a telemedicine visit and that the visit is being conducted through the [employer] platform. She agrees to proceed..  My office door was closed. No one else was in the room.  She acknowledged consent and understanding of privacy and security of the video platform. The patient has agreed to participate and understands they can discontinue the visit at any time.    Verification of patient location:  Patient is located at Home in the following state in which I hold an active license [place]    Patient is aware this is a billable service.     Reason for visit is No chief complaint on file.       Subjective  HPI   Pt reports stress related rosacea flare off and on for about 1 month. Started on forehead and now moving to nose. No pain, itching, burning. Tried alcohol, skin care without relief. Now it is crusting or bleeding when she cleans it.    Past Medical History:   Diagnosis Date    Allergic rhinitis     Gets immunotherapy with Dr. Carolina    Sheehan esophagus A long time ago    Have issues with eating rice and other foods    Closed fracture of right inferior pubic ramus (HCC) 12/08/2019    Closed fracture of right superior pubic ramus (HCC) 12/08/2019    Colon polyp     Diabetes mellitus (HCC) TESTING IN MAY 2019    FAMILY HISTORY    Dysphagia     GERD (gastroesophageal reflux disease)     Hernia 2019    Discovered after falling    Irritable bowel syndrome 2/7/1999    Had a lot of stress at the time    Vertigo     LAST ASSESSED 3/8/13; RESOLVED 11/10/15       Past Surgical History:   Procedure Laterality Date    ABDOMINAL SURGERY      BLADDER SUSPENSION      BUNIONECTOMY Right     with implants    COLONOSCOPY      Approx 10 years sfo Dr Ansari    EGD      FLEXIBLE  SIGMOIDOSCOPY  1999    Disgnosed with IBS    HERNIA REPAIR  2021    Had hernia repaired    TOTAL ABDOMINAL HYSTERECTOMY  2006    WITH REMOVAL OF BOTH OVARIES     UPPER GASTROINTESTINAL ENDOSCOPY      Approx 10 yrs ago Dr Ansari.  Also had esophagus stretched        Allergies   Allergen Reactions    Sulfa Antibiotics Other (See Comments)     migraines    Sulfasalazine Other (See Comments)     migraines       Review of Systems   Constitutional:  Negative for fever.   HENT:  Negative for nosebleeds.    Eyes:  Negative for redness.   Respiratory:  Negative for shortness of breath.    Cardiovascular:  Negative for chest pain.   Gastrointestinal:  Negative for blood in stool.   Genitourinary:  Negative for hematuria.   Musculoskeletal:  Negative for gait problem.   Skin:  Positive for rash.   Neurological:  Negative for seizures.   Psychiatric/Behavioral:  Negative for behavioral problems.        Video Exam    There were no vitals filed for this visit.    Physical Exam  Constitutional:       General: She is not in acute distress.     Appearance: Normal appearance. She is not toxic-appearing.   HENT:      Head: Normocephalic and atraumatic.        Nose: No rhinorrhea.      Mouth/Throat:      Mouth: Mucous membranes are moist.   Eyes:      Conjunctiva/sclera: Conjunctivae normal.      Comments: glasses   Pulmonary:      Effort: Pulmonary effort is normal. No respiratory distress.      Breath sounds: No wheezing (no gross audible wheeze through computer).   Musculoskeletal:      Cervical back: Normal range of motion.   Skin:     Findings: No rash (on face or neck).   Neurological:      Mental Status: She is alert.      Cranial Nerves: No dysarthria or facial asymmetry.   Psychiatric:         Mood and Affect: Mood normal.         Behavior: Behavior normal.         Visit Time  Total Visit Duration: 13 minutes    Assessment/Plan:    Diagnoses and all orders for this visit:    Rosacea, acne  -     metroNIDAZOLE (METROCREAM) 0.75  % cream; Apply topically 2 (two) times a day for 7 days    Esophagitis        Patient Instructions   Schedule follow up with dermatologist as soon as possible

## 2024-10-21 ENCOUNTER — TELEPHONE (OUTPATIENT)
Age: 64
End: 2024-10-21

## 2024-10-21 ENCOUNTER — AMB VIDEO VISIT (OUTPATIENT)
Dept: OTHER | Facility: HOSPITAL | Age: 64
End: 2024-10-21

## 2024-10-21 ENCOUNTER — NURSE TRIAGE (OUTPATIENT)
Age: 64
End: 2024-10-21

## 2024-10-21 NOTE — TELEPHONE ENCOUNTER
Patient seen at  [facility] prescribed Rx: Augmentin.  Symptoms started Friday    Patient tested Positive for COVID today 10/21/24. Cough, Fever, HA, ST. Chills, Body aches.  No appointment nor PCP Virtual available    Please review and advice

## 2024-10-21 NOTE — TELEPHONE ENCOUNTER
Pt returned call did check the chart message and tried to schedule her in no availability with Dr. Trevino scheduled her with Dr. Crouch. Thanks

## 2024-10-21 NOTE — TELEPHONE ENCOUNTER
"Patient calls to discuss her options regarding Paxlovid. Patient tried to have a virtual visit but it was a failure. Patient couldn't conduct the visit. Patient was seen at [facility] on 10/19/24. Patient was Covid negative on 10/19/24. Patient then tested + on 10/20/24. Patient needed an official test for her employer so she was tested this Am at [facility] and was also positive. Patient is asking for Paxlovid. Please call the patient at [phone]. Any new prescriptions should go to [facility]  Reason for Disposition   [1] COVID-19 infection suspected by caller or triager AND [2] mild symptoms (cough, fever, or others) AND [3] negative COVID-19 rapid test    Answer Assessment - Initial Assessment Questions  1. COVID-19 DIAGNOSIS: \"Who made your COVID-19 diagnosis?\" \"Was it confirmed by a positive lab test or self-test?\" If not diagnosed by a doctor (or NP/PA), ask \"Are there lots of cases (community spread) where you live?\" Note: See [facility] health department website, if unsure.      Tested at home and also at   2. COVID-19 EXPOSURE: \"Was there any known exposure to COVID before the symptoms began?\" CDC Definition of close contact: within 6 feet (2 meters) for a total of 15 minutes or more over a 24-hour period.       unsure  3. ONSET: \"When did the COVID-19 symptoms start?\"       10/19/24  4. WORST SYMPTOM: \"What is your worst symptom?\" (e.g., cough, fever, shortness of breath, muscle aches)      Body aches and cough  5. COUGH: \"Do you have a cough?\" If Yes, ask: \"How bad is the cough?\"        cough  6. FEVER: \"Do you have a fever?\" If Yes, ask: \"What is your temperature, how was it measured, and when did it start?\"      yes  7. RESPIRATORY STATUS: \"Describe your breathing?\" (e.g., shortness of breath, wheezing, unable to speak)       No respiratory distress  8. BETTER-SAME-WORSE: \"Are you getting better, staying the same or getting worse compared to yesterday?\"  If getting worse, ask, \"In what " "way?\"      Slightly better  9. HIGH RISK DISEASE: \"Do you have any chronic medical problems?\" (e.g., asthma, heart or lung disease, weak immune system, obesity, etc.)      denies  10. VACCINE: \"Have you had the COVID-19 vaccine?\" If Yes, ask: \"Which one, how many shots, when did you get it?\"        yes  11. BOOSTER: \"Have you received your COVID-19 booster?\" If Yes, ask: \"Which one and when did you get it?\"        yes  12. PREGNANCY: \"Is there any chance you are pregnant?\" \"When was your last menstrual period?\"        N/A  13. OTHER SYMPTOMS: \"Do you have any other symptoms?\"  (e.g., chills, fatigue, headache, loss of smell or taste, muscle pain, sore throat)        Headache, body aches,   14. O2 SATURATION MONITOR:  \"Do you use an oxygen saturation monitor (pulse oximeter) at home?\" If Yes, ask \"What is your reading (oxygen level) today?\" \"What is your usual oxygen saturation reading?\" (e.g., 95%)        N/A    Protocols used: Coronavirus (COVID-19) Diagnosed or Suspected-ADULT-AH    "

## 2024-10-21 NOTE — TELEPHONE ENCOUNTER
Called Pt to schedule a visit with any provider in office. No answer LM requesting Pt to call back to schedule.

## 2024-10-22 ENCOUNTER — TELEMEDICINE (OUTPATIENT)
Dept: FAMILY MEDICINE CLINIC | Facility: CLINIC | Age: 64
End: 2024-10-22
Payer: COMMERCIAL

## 2024-10-22 DIAGNOSIS — U07.1 COVID-19: Primary | ICD-10-CM

## 2024-10-22 PROCEDURE — 99214 OFFICE O/P EST MOD 30 MIN: CPT | Performed by: FAMILY MEDICINE

## 2024-10-22 RX ORDER — NIRMATRELVIR AND RITONAVIR 300-100 MG
3 KIT ORAL 2 TIMES DAILY
Qty: 30 TABLET | Refills: 0 | Status: SHIPPED | OUTPATIENT
Start: 2024-10-22 | End: 2024-10-27

## 2024-10-22 NOTE — PROGRESS NOTES
COVID-19 Outpatient Progress Note  Name: Karen Goins      : 1960      MRN: [id number]  Encounter Provider: Jac Crouch Jr, MD  Encounter Date: 10/22/2024   Encounter department: [facility]    Assessment & Plan  COVID-19  We discussed risk and benefits of Paxlovid versus watchful waiting.  She would like to proceed with Paxlovid as her symptoms do seem to be progressing.  This was sent to her pharmacy.  She is counseled to contact me if symptoms are getting any worse.  She can still continue Mucinex as needed.  Orders:    nirmatrelvir & ritonavir (Paxlovid, 300/100,) tablet therapy pack; Take 3 tablets by mouth 2 (two) times a day for 5 days Take 2 nirmatrelvir tablets + 1 ritonavir tablet together per dose    Disposition:     Discussed symptom directed medication options with patient.     Patient meets criteria for Paxlovid and they have been counseled appropriately regarding risks, benefits, side effects, and alternative treatment options. After discussion, patient agrees to treatment.    Possible side effects of Paxlovid?    Possible side effects of Paxlovid are:  - Liver Problems. Notify us right away if you start to experience loss of appetite, yellowing of your skin and the whites of eyes (jaundice), dark-colored urine, pale colored stools and itchy skin, stomach area (abdominal) pain.  - Resistance to HIV Medicines. If you have untreated HIV infection, Paxlovid may lead to some HIV medicines not working as well in the future.  - Other possible side effects include: altered sense of taste, diarrhea, high blood pressure, or muscle aches.    I have spent a total time of 15 minutes on the day of the encounter for this patient including risks and benefits of treatment options.          Encounter provider: Jac Crouch Jr, MD     Provider located at: [facility] [address] [address] [phone]     Recent  Visits  No visits were found meeting these conditions.  Showing recent visits within past 7 days and meeting all other requirements  Today's Visits  Date Type Provider Dept   10/22/24 Telemedicine Jac Chung Jr., MD [facility]   Showing today's visits and meeting all other requirements  Future Appointments  No visits were found meeting these conditions.  Showing future appointments within next 150 days and meeting all other requirements    History of Present Illness      This virtual check-in was done via [employer] and patient was informed that this is a secure, HIPAA-compliant platform. She agrees to proceed.    Patient agrees to participate in a virtual check in via telephone or video visit instead of presenting to the office to address urgent/immediate medical needs. Patient is aware this is a billable service. She acknowledged consent and understanding of privacy and security of the video platform. The patient has agreed to participate and understands they can discontinue the visit at any time.    After connecting through [employer], the patient was identified by name and date of birth. Karen Goins was informed that this was a telemedicine visit and that the exam was being conducted confidentially over secure lines. Karen Goins acknowledged consent and understanding of privacy and security of the telemedicine visit. I informed the patient that I have reviewed her record in Epic and presented the opportunity for her to ask any questions regarding the visit today. The patient agreed to participate.     Verification of patient location:  Patient is located in the following state in which I hold an active license: [place]    Subjective:   Karen Goins is a 64 y.o. female who is concerned about COVID-19. Patient's symptoms include fever (subjective), fatigue, nasal congestion, rhinorrhea, sore throat, anosmia, loss of taste, cough, chest tightness (R chest feels tight), nausea  and headache. Patient denies chills, shortness of breath, abdominal pain, vomiting, diarrhea and myalgias.     - Date of symptom onset: 10/18/2024      COVID-19 vaccination status: Fully vaccinated with booster    Lab Results   Component Value Date    SARSCOV2 Negative 12/27/2021    SARSCORONAVI Not Detected 05/09/2020    SARSCOVAG Negative 05/31/2022       Review of Systems   Constitutional:  Positive for fatigue and fever (subjective). Negative for chills.   HENT:  Positive for congestion, rhinorrhea and sore throat.    Respiratory:  Positive for cough and chest tightness (R chest feels tight). Negative for shortness of breath.    Gastrointestinal:  Positive for nausea. Negative for abdominal pain, diarrhea and vomiting.   Musculoskeletal:  Negative for myalgias.   Neurological:  Positive for headaches.     Objective     There were no vitals taken for this visit.    Physical Exam  Constitutional:       General: She is not in acute distress.     Appearance: She is well-developed.   HENT:      Head: Normocephalic.   Eyes:      General:         Right eye: No discharge.      Conjunctiva/sclera: Conjunctivae normal.   Pulmonary:      Effort: Pulmonary effort is normal. No respiratory distress.   Skin:     Findings: No rash.   Neurological:      Mental Status: She is alert.

## 2024-12-31 ENCOUNTER — OFFICE VISIT (OUTPATIENT)
Dept: FAMILY MEDICINE CLINIC | Facility: CLINIC | Age: 64
End: 2024-12-31
Payer: COMMERCIAL

## 2024-12-31 VITALS
BODY MASS INDEX: 26.21 KG/M2 | TEMPERATURE: 98.7 F | WEIGHT: 167 LBS | HEIGHT: 67 IN | DIASTOLIC BLOOD PRESSURE: 78 MMHG | OXYGEN SATURATION: 99 % | RESPIRATION RATE: 20 BRPM | SYSTOLIC BLOOD PRESSURE: 132 MMHG | HEART RATE: 87 BPM

## 2024-12-31 DIAGNOSIS — Z00.00 ANNUAL PHYSICAL EXAM: Primary | ICD-10-CM

## 2024-12-31 DIAGNOSIS — M81.0 AGE-RELATED OSTEOPOROSIS WITHOUT CURRENT PATHOLOGICAL FRACTURE: ICD-10-CM

## 2024-12-31 DIAGNOSIS — K21.9 GASTROESOPHAGEAL REFLUX DISEASE WITHOUT ESOPHAGITIS: ICD-10-CM

## 2024-12-31 DIAGNOSIS — J30.9 ALLERGIC RHINITIS, UNSPECIFIED SEASONALITY, UNSPECIFIED TRIGGER: ICD-10-CM

## 2024-12-31 DIAGNOSIS — E78.2 MIXED HYPERLIPIDEMIA: ICD-10-CM

## 2024-12-31 DIAGNOSIS — M65.90 TENOSYNOVITIS: ICD-10-CM

## 2024-12-31 DIAGNOSIS — J01.90 ACUTE NON-RECURRENT SINUSITIS, UNSPECIFIED LOCATION: ICD-10-CM

## 2024-12-31 PROCEDURE — G0439 PPPS, SUBSEQ VISIT: HCPCS | Performed by: FAMILY MEDICINE

## 2024-12-31 PROCEDURE — 99214 OFFICE O/P EST MOD 30 MIN: CPT | Performed by: FAMILY MEDICINE

## 2024-12-31 RX ORDER — AMOXICILLIN 875 MG/1
TABLET, COATED ORAL
Qty: 14 TABLET | Refills: 0 | Status: SHIPPED | OUTPATIENT
Start: 2024-12-31 | End: 2025-01-07

## 2024-12-31 NOTE — PATIENT INSTRUCTIONS
Once feeling better consider:  Flu shot  And   Shingrix    Fasting labs in the spring.    Refer to rheum to discuss osteoporosis treatment options    Continue vit D3 and weight bearing exercises    Consider yoga  Online - doyogawithme - for stretching    Referral to ortho for injection into wrist

## 2024-12-31 NOTE — ASSESSMENT & PLAN NOTE
Not taking meds due to concerns for ADR's  But with GERD history - particularly untreated - do not feel that a bisphosphonate would be the best option for OP treatment  Will therefore - send to rheum to consider treatment options - ie: prolia.  Note - no fractures  And (-) workup for secondary causes of OP in bloodwork.

## 2024-12-31 NOTE — PROGRESS NOTES
Name: Karen Goins      : 1960      MRN: [id number]  Encounter Provider: Jaqueline Trevino DO  Encounter Date: 2024   Encounter department: [facility]    Assessment & Plan  Annual physical exam  Anticipatory guidance and preventative medicine discussed  Colonoscopy, mammo and pap all up to date  Considering flu shot - would consider once feeling better  Would also recommend shingrix       Patient Instructions   Once feeling better consider:  Flu shot  And   Shingrix    Fasting labs in the spring.    Refer to rheum to discuss osteoporosis treatment options    Continue vit D3 and weight bearing exercises    Consider yoga  Online - doyogawithme - for stretching         Acute non-recurrent sinusitis, unspecified location  Antibiotic  Recommendation to increase fluids - particularly water, rest, saline nasal spray and humidified air    Orders:    amoxicillin (AMOXIL) 875 mg tablet; Take 1 tab po bid for 7 days with food; eat yogurt while taking    Allergic rhinitis, unspecified seasonality, unspecified trigger  Meds prn       Mixed hyperlipidemia    Orders:    Comprehensive metabolic panel; Future    Lipid panel; Future    TSH, 3rd generation; Future    Age-related osteoporosis without current pathological fracture    Orders:    Ambulatory Referral to Rheumatology; Future    Gastroesophageal reflux disease without esophagitis  Not taking meds due to concerns for ADR's  But with GERD history - particularly untreated - do not feel that a bisphosphonate would be the best option for OP treatment  Will therefore - send to rheum to consider treatment options - ie: prolia.  Note - no fractures  And (-) workup for secondary causes of OP in bloodwork.         Tenosynovitis  Left 1st mcp joint  Advise splint  And refer to ortho for injection  As pt has failed topical nsaids as well   Orders:    Ambulatory Referral to Orthopedic Surgery; Future       Preventive health issues were  discussed with patient, and age appropriate screening tests were ordered as noted in patient's After Visit Summary. Personalized health advice and appropriate referrals for health education or preventive services given if needed, as noted in patient's After Visit Summary.    History of Present Illness   Here for AWV  And also concern for sinus infection  Taking nyquil and dayquil  Symptoms began X 5 days ago.  Was traveling.      HPI   Patient Care Team:  Jaqueline Trevino DO as PCP - General (Family Medicine)    Review of Systems   Constitutional:  Negative for fever.   HENT:  Positive for congestion, postnasal drip, sinus pressure, sinus pain and sore throat.    Eyes:  Negative for visual disturbance.   Respiratory:  Positive for cough. Negative for shortness of breath and wheezing.    Cardiovascular:  Negative for chest pain and palpitations.   Gastrointestinal:  Negative for abdominal pain, blood in stool, constipation, diarrhea, nausea and vomiting.        GERD symptoms seem stress related.  Not taking meds due to adr concerns.     Musculoskeletal:         Getting some right hip and low back pain    C/o pain pain in left 1st mcp joint  Was using a night splint  And topical gel  Is right handed but uses left hand for a lot     Neurological:  Positive for headaches. Negative for dizziness and syncope.        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         Lots of stress       Medical History Reviewed by provider this encounter:  Tobacco  Allergies  Meds  Problems  Med Hx  Surg Hx  Fam Hx       Annual Wellness Visit Questionnaire   Last Medicare Wellness visit information reviewed, patient interviewed and updates made to the record today.      Health Risk Assessment:   Patient rates overall health as good. Patient feels that their physical health rating is much worse. Patient is satisfied  with their life. Eyesight was rated as slightly worse. Hearing was rated as slightly worse. Patient feels that their emotional and mental health rating is slightly worse. Patients states they are never, rarely angry. Patient states they are sometimes unusually tired/fatigued. Pain experienced in the last 7 days has been some. Patient's pain rating has been 3/10. Patient states that she has experienced weight loss or gain in last 6 months. I have had hip pain and severe hand pain that no matter what I do, the hand pain won’t go away.    Depression Screening:   PHQ-2 Score: 0      Fall Risk Screening:   In the past year, patient has experienced: no history of falling in past year      Urinary Incontinence Screening:   Patient has leaked urine accidently in the last six months.     Home Safety:  Patient has trouble with stairs inside or outside of their home. Patient has working smoke alarms and has working carbon monoxide detector. Home safety hazards include: none.     Nutrition:   Current diet is Unhealthy.     Medications:   Patient is currently taking over-the-counter supplements. OTC medications include: see medication list. Patient is able to manage medications.     Activities of Daily Living (ADLs)/Instrumental Activities of Daily Living (IADLs):   Walk and transfer into and out of bed and chair?: Yes  Dress and groom yourself?: Yes    Bathe or shower yourself?: Yes    Feed yourself? Yes  Do your laundry/housekeeping?: Yes  Manage your money, pay your bills and track your expenses?: Yes  Make your own meals?: Yes    Do your own shopping?: Yes    Previous Hospitalizations:   Any hospitalizations or ED visits within the last 12 months?: No      Advance Care Planning:   Living will: Yes    Durable POA for healthcare: Yes    Advanced directive: Yes    Advanced directive counseling given: Yes      Cognitive Screening:   Provider or family/friend/caregiver concerned regarding cognition?: No    PREVENTIVE  SCREENINGS      Cardiovascular Screening:    General: Screening Not Indicated and History Lipid Disorder      Diabetes Screening:     General: Screening Current      Colorectal Cancer Screening:     General: Screening Current      Breast Cancer Screening:     General: Screening Current      Cervical Cancer Screening:    General: Screening Not Indicated      Osteoporosis Screening:    General: Screening Not Indicated and History Osteoporosis      Abdominal Aortic Aneurysm (AAA) Screening:        General: Screening Not Indicated      Lung Cancer Screening:     General: Screening Not Indicated      Hepatitis C Screening:    General: Screening Current    Screening, Brief Intervention, and Referral to Treatment (SBIRT)    Screening  Typical number of drinks in a day: 0  Typical number of drinks in a week: 1  Interpretation: Low risk drinking behavior.    AUDIT-C Screenin) How often did you have a drink containing alcohol in the past year? monthly or less  2) How many drinks did you have on a typical day when you were drinking in the past year? 1 to 2  3) How often did you have 6 or more drinks on one occasion in the past year? never    AUDIT-C Score: 1  Interpretation: Score 0-2 (female): Negative screen for alcohol misuse    Single Item Drug Screening:  How often have you used an illegal drug (including marijuana) or a prescription medication for non-medical reasons in the past year? never    Single Item Drug Screen Score: 0  Interpretation: Negative screen for possible drug use disorder    Other Counseling Topics:   Regular weightbearing exercise.     Social Drivers of Health     Food Insecurity: No Food Insecurity (2024)    Hunger Vital Sign     Worried About Running Out of Food in the Last Year: Never true     Ran Out of Food in the Last Year: Never true   Transportation Needs: No Transportation Needs (2024)    PRAPARE - Transportation     Lack of Transportation (Medical): No     Lack of  "Transportation (Non-Medical): No   Housing Stability: Low Risk  (12/24/2024)    Housing Stability Vital Sign     Unable to Pay for Housing in the Last Year: No     Number of Times Moved in the Last Year: 0     Homeless in the Last Year: No   Utilities: Not At Risk (12/24/2024)    [facility] Utilities     Threatened with loss of utilities: No     No results found.    Objective   /78 (BP Location: Left arm, Patient Position: Sitting, Cuff Size: Standard)   Pulse 87   Temp 98.7 °F (37.1 °C) (Tympanic)   Resp 20   Ht 5' 7\" (1.702 m)   Wt 75.8 kg (167 lb)   SpO2 99%   BMI 26.16 kg/m²     Physical Exam  Vitals and nursing note reviewed.   Constitutional:       General: She is not in acute distress.     Appearance: Normal appearance. She is not ill-appearing or toxic-appearing.   HENT:      Right Ear: Tympanic membrane normal. There is no impacted cerumen.      Left Ear: Tympanic membrane normal. There is no impacted cerumen.      Nose: Congestion present.      Comments: Tenderness over the maxillary and frontal sinuses  Also red and edematous in frontal sinus area       Mouth/Throat:      Mouth: Mucous membranes are moist.      Pharynx: No oropharyngeal exudate or posterior oropharyngeal erythema.   Eyes:      General: No scleral icterus.     Extraocular Movements: Extraocular movements intact.      Pupils: Pupils are equal, round, and reactive to light.   Neck:      Vascular: No carotid bruit.   Cardiovascular:      Rate and Rhythm: Normal rate and regular rhythm.      Pulses: Normal pulses.      Heart sounds: Normal heart sounds. No murmur heard.  Pulmonary:      Effort: Pulmonary effort is normal. No respiratory distress.      Breath sounds: Normal breath sounds. No wheezing, rhonchi or rales.   Abdominal:      General: There is no distension.      Palpations: Abdomen is soft. There is no mass.      Tenderness: There is no abdominal tenderness. There is no guarding or rebound.   Musculoskeletal:      Cervical " back: Neck supple.      Right lower leg: No edema.      Left lower leg: No edema.      Comments: Tenderness along left 1st mcp joint with palpation and with passive external wrist rotation  No thenar eminence atrophy     Lymphadenopathy:      Cervical: No cervical adenopathy.   Skin:     General: Skin is warm.      Coloration: Skin is not jaundiced.      Findings: No rash.   Neurological:      General: No focal deficit present.      Mental Status: She is alert and oriented to person, place, and time.   Psychiatric:         Mood and Affect: Mood normal.         Behavior: Behavior normal.         Thought Content: Thought content normal.         Judgment: Judgment normal.

## 2025-01-23 ENCOUNTER — OFFICE VISIT (OUTPATIENT)
Dept: OBGYN CLINIC | Facility: MEDICAL CENTER | Age: 65
End: 2025-01-23
Payer: COMMERCIAL

## 2025-01-23 VITALS — HEIGHT: 67 IN | WEIGHT: 167 LBS | BODY MASS INDEX: 26.21 KG/M2

## 2025-01-23 DIAGNOSIS — M18.12 ARTHRITIS OF CARPOMETACARPAL (CMC) JOINT OF LEFT THUMB: Primary | ICD-10-CM

## 2025-01-23 PROCEDURE — 99203 OFFICE O/P NEW LOW 30 MIN: CPT | Performed by: ORTHOPAEDIC SURGERY

## 2025-01-23 PROCEDURE — 20600 DRAIN/INJ JOINT/BURSA W/O US: CPT | Performed by: ORTHOPAEDIC SURGERY

## 2025-01-23 RX ORDER — PHENOL 1.4 %
AEROSOL, SPRAY (ML) MUCOUS MEMBRANE
COMMUNITY

## 2025-01-23 RX ORDER — ROPIVACAINE HYDROCHLORIDE 2 MG/ML
0.5 INJECTION, SOLUTION EPIDURAL; INFILTRATION; PERINEURAL
Status: COMPLETED | OUTPATIENT
Start: 2025-01-23 | End: 2025-01-23

## 2025-01-23 RX ORDER — BETAMETHASONE SODIUM PHOSPHATE AND BETAMETHASONE ACETATE 3; 3 MG/ML; MG/ML
3 INJECTION, SUSPENSION INTRA-ARTICULAR; INTRALESIONAL; INTRAMUSCULAR; SOFT TISSUE
Status: COMPLETED | OUTPATIENT
Start: 2025-01-23 | End: 2025-01-23

## 2025-01-23 RX ADMIN — BETAMETHASONE SODIUM PHOSPHATE AND BETAMETHASONE ACETATE 3 MG: 3; 3 INJECTION, SUSPENSION INTRA-ARTICULAR; INTRALESIONAL; INTRAMUSCULAR; SOFT TISSUE at 09:00

## 2025-01-23 RX ADMIN — ROPIVACAINE HYDROCHLORIDE 0.5 ML: 2 INJECTION, SOLUTION EPIDURAL; INFILTRATION; PERINEURAL at 09:00

## 2025-01-23 NOTE — PROGRESS NOTES
The HAND & UPPER EXTREMITY OFFICE VISIT   Referred By:  Jaqueline Trevino Do  [address]  [address]  [place] [address]      Chief Complaint:     Left thumb pain    History of Present Illness:   64 y.o., Right hand dominant female presents with left thumb pain for about 3-4 months. She denies any specific injury, but recalls the pain first started bothering her when trying to turn a doorknob. Her pain is localized to the base of the thumb and is worse with pinching and gripping. She has been wearing a brace for immobilization and applying Voltaren gel with some relief. She denies numbness or tingling in the hand.     ADLs: Community ambulator  Smoke: denies ETOH: rarely   Drugs:  denies        Past Medical History:  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    Have issues with eating rice and other foods    Closed fracture of right inferior pubic ramus (HCC) 12/08/2019    Closed fracture of right superior pubic ramus (HCC) 12/08/2019    Colon polyp     Diabetes mellitus (HCC) TESTING IN MAY 2019    FAMILY HISTORY    Dysphagia     GERD (gastroesophageal reflux disease)     Headache(784.0) HAVE HAD SEVERAL HEADACHES    THOUGHT IT WAS ALLERGIES OR DIABETES RELATED    Hernia 2019    Discovered after falling    Irritable bowel syndrome 2/7/1999    Had a lot of stress at the time    Shingles     Vertigo     LAST ASSESSED 3/8/13; RESOLVED 11/10/15    Visual impairment SMALL CHANGES IN EYESIGHT    HAD VISION EXAM. SMALL CHANGES IN VISION     Past Surgical History:   Procedure Laterality Date    ABDOMINAL SURGERY      BLADDER SUSPENSION      BUNIONECTOMY Right     with implants    COLONOSCOPY      Approx 10 years sfo Dr Ansari    EGD      EYE SURGERY  EYE LID SURGERY IN PAST YEAR    HAD GROWTH ON EYELID AFFECTING EYESIGHT    FLEXIBLE SIGMOIDOSCOPY  1999    Disgnosed  with IBS    HERNIA REPAIR  2021    Had hernia repaired    TOTAL ABDOMINAL HYSTERECTOMY  2006    WITH REMOVAL OF BOTH OVARIES     UPPER GASTROINTESTINAL ENDOSCOPY      Approx 10 yrs ago Dr Ansari.  Also had esophagus stretched     Family History   Problem Relation Age of Onset    Cancer Mother         Multiple Myeloma for 20 yrs    Diabetes Father     Melanoma Father     Prostate cancer Father     Diabetes Brother         Diabetes    Hypertension Brother     Diabetes Brother         Diabetes     Social History     Socioeconomic History    Marital status: /Civil Union     Spouse name: Not on file    Number of children: Not on file    Years of education: Not on file    Highest education level: Not on file   Occupational History    Not on file   Tobacco Use    Smoking status: Never    Smokeless tobacco: Never   Vaping Use    Vaping status: Never Used   Substance and Sexual Activity    Alcohol use: Yes     Alcohol/week: 1.0 standard drink of alcohol     Types: 1 Cans of beer per week     Comment: Not even 1 beer a week. I probably drink a case a year.    Drug use: No    Sexual activity: Not Currently     Partners: Male     Birth control/protection: Surgical     Comment: Hysterectomy   Other Topics Concern    Not on file   Social History Narrative    Daily caffeine use- 2 cups of iced tea, 2-3 cups of hot tea     Social Drivers of Health     Financial Resource Strain: Not on file   Food Insecurity: No Food Insecurity (12/24/2024)    Hunger Vital Sign     Worried About Running Out of Food in the Last Year: Never true     Ran Out of Food in the Last Year: Never true   Transportation Needs: No Transportation Needs (12/24/2024)    PRAPARE - Transportation     Lack of Transportation (Medical): No     Lack of Transportation (Non-Medical): No   Physical Activity: Not on file   Stress: Not on file   Social Connections: Not on file   Intimate Partner Violence: Not on file   Housing Stability: Low Risk  (12/24/2024)     "Housing Stability Vital Sign     Unable to Pay for Housing in the Last Year: No     Number of Times Moved in the Last Year: 0     Homeless in the Last Year: No     Scheduled Meds:  Continuous Infusions:No current facility-administered medications for this visit.    PRN Meds:.  Allergies   Allergen Reactions    Sulfa Antibiotics Other (See Comments)     migraines    Sulfasalazine Other (See Comments)     migraines           Physical Examination:    Ht 5' 7\" (1.702 m)   Wt 75.8 kg (167 lb)   BMI 26.16 kg/m²     Gen: A&Ox3, NAD  Cardiac: regular rate  Chest: non labored breathing  Abdomen: Non-distended    Left Upper Extremity:  Skin CDI  No obvious deformity of the shoulder, arm, elbow, forearm, wrist, hand  Swelling Negative  TTP thumb CMC  Non-tender first dorsal compartment or thumb A1 pulley  Sensation intact to light touch in the axillary median, ulnar, and radial nerve distributions  Full composite fist  Pain with opposition to small finger  Warm, well-perfused digits  Cap refill <2s      Studies:  Radiographs: I have personally reviewed and interpreted all available radiographs.   3/2/2021: Radiographs of the left wrist, multiple views, demonstrate no acute fracture or dislocation. No significant degenerative changes. Normal carpal alignment.     3/11/22: Hgb A1c 5.6    Assessment and Plan:  1. Arthritis of carpometacarpal (CMC) joint of left thumb  Ambulatory Referral to Orthopedic Surgery    Thumb Cude comf/Cool    Small joint arthrocentesis: L thumb CMC          64 y.o. female presents with signs and symptoms consistent with the above diagnosis.  We discussed the natural history of this condition and its pathogenesis.  We discussed operative and nonoperative treatment options. She has previously tried bracing and topical NSAIDs without significant relief. We discussed additional treatment options including CSI and Comfort Cool brace as needed during activities. She would like to try a CSI in the office " today. Risks, benefits and alternative treatments were discussed with the patient. These risks of injection include but are not limited to: bleeding, infection, allergic reaction to agents, possible increase in pain, and/or elevation in blood sugar. Patient understands and would like to proceed with the proposed procedure. Patient tolerated the procedure well without any complications. See procedure note for details. she may take NSAIDs/Tylenol or apply ice to the area as needed for post-injection discomfort. It is recommended she return to the office in 3 months as needed for repeat evaluation.     she expressed understanding of the plan and agreed. We encouraged them to contact our office with any questions or concerns.         Adam Montes MD  Hand and Upper Extremity Surgery        *This note was dictated using Dragon voice recognition software. Please excuse any word substitutions or errors.*    Small joint arthrocentesis: L thumb CMC  [place] Protocol:  procedure performed by consultantConsent: Verbal consent obtained.  Risks and benefits: risks, benefits and alternatives were discussed  Consent given by: patient  Patient understanding: patient states understanding of the procedure being performed  Site marked: the operative site was marked  Radiology Images displayed and confirmed. If images not available, report reviewed: imaging studies available  Required items: required blood products, implants, devices, and special equipment available  Patient identity confirmed: verbally with patient  Supporting Documentation  Indications: pain   Procedure Details  Location: thumb - L thumb CMC  Preparation: Patient was prepped and draped in the usual sterile fashion  Needle size: 25 G  Ultrasound guidance: no  Approach: dorsal  Medications administered: 3 mg betamethasone acetate-betamethasone sodium phosphate 6 (3-3) mg/mL; 0.5 mL ropivacaine 0.2 %    Patient tolerance: patient tolerated the procedure well with  no immediate complications  Dressing:  Sterile dressing applied              Scribe Attestation      I,:  Joan Silva PA-C am acting as a scribe while in the presence of the attending physician.:       I,:  Adam Montes MD personally performed the services described in this documentation    as scribed in my presence.:

## 2025-01-27 ENCOUNTER — TELEPHONE (OUTPATIENT)
Dept: RHEUMATOLOGY | Facility: CLINIC | Age: 65
End: 2025-01-27

## 2025-01-27 ENCOUNTER — OFFICE VISIT (OUTPATIENT)
Dept: RHEUMATOLOGY | Facility: CLINIC | Age: 65
End: 2025-01-27
Payer: COMMERCIAL

## 2025-01-27 VITALS
HEART RATE: 92 BPM | DIASTOLIC BLOOD PRESSURE: 72 MMHG | WEIGHT: 165 LBS | OXYGEN SATURATION: 95 % | HEIGHT: 67 IN | SYSTOLIC BLOOD PRESSURE: 140 MMHG | BODY MASS INDEX: 25.9 KG/M2

## 2025-01-27 DIAGNOSIS — M81.0 AGE-RELATED OSTEOPOROSIS WITHOUT CURRENT PATHOLOGICAL FRACTURE: Primary | ICD-10-CM

## 2025-01-27 DIAGNOSIS — M81.0 AGE-RELATED OSTEOPOROSIS WITHOUT CURRENT PATHOLOGICAL FRACTURE: ICD-10-CM

## 2025-01-27 PROCEDURE — 99244 OFF/OP CNSLTJ NEW/EST MOD 40: CPT | Performed by: INTERNAL MEDICINE

## 2025-01-27 RX ORDER — SODIUM CHLORIDE 9 MG/ML
20 INJECTION, SOLUTION INTRAVENOUS ONCE
Status: CANCELLED | OUTPATIENT
Start: 2025-01-28

## 2025-01-27 RX ORDER — MELATONIN/PYRIDOXINE HCL (B6) 10 MG-10MG
TABLET,IMMED, EXTENDED RELEASE, BIPHASIC ORAL
COMMUNITY
Start: 2024-02-18

## 2025-01-27 RX ORDER — ZOLEDRONIC ACID 0.05 MG/ML
5 INJECTION, SOLUTION INTRAVENOUS ONCE
Status: CANCELLED | OUTPATIENT
Start: 2025-01-28

## 2025-01-27 NOTE — TELEPHONE ENCOUNTER
Please prior auth IV Reclast 5 mg/100 ml IV yearly    Dx Osteoporosis     Severe GERD, unable to take oral bisphonates

## 2025-01-27 NOTE — PROGRESS NOTES
Name: Karen Goins      : 1960      MRN: [id number]  Encounter Provider: Lalito Nino MD  Encounter Date: 2025   Encounter department: [facility]  :  Assessment & Plan  Age-related osteoporosis without current pathological fracture    Orders:    Ambulatory Referral to Rheumatology    Basic metabolic panel; Future    Protein electrophoresis, serum; Future    Reviewed osteoporosis and provided handouts    Bone health: DXA  c/w T score. H/o pelvis right pubic ramus in 2019 while skiing, did not need surgery. No personal h/o malignancies, does have GERD/Roberto's    Reviewed various treatment options and provided handouts    Check labs    Prior auth IV reclast     RTC annualy          Thank you for involving me in this patient's care.        Lalito Nino MD  [facility]    This is a Rheumatology Consult seen at the request of Dr. Trevino     History of Present Illness   HPI  Karen Goins is a 64 y.o. female who presents for further evaluation osteoporosis. She has past medical history GERD, Sheehan's esophagus, pre-diabetes, IBS    OA 1st CMC joint: Sees hand Ortho Dr. Montes and had injection     Bone health: DXA  c/w T score. H/o pelvis right pubic ramus in 2019 while skiing, did not need surgery. No personal h/o malignancies, does have GERD/Roberto's    Had surgical menopause at age 45.       Review of Systems  Pertinent Medical History       --------------------------------------------------------------------------------------------------------      ROS:      - for: Fevers, Chills or sweats.  No HAs or scalp tenderness.  No jaw claudication.  No acute visual or eye changes.  No dry eyes.  No auditory complaints.  No oral lesions or ulcers.  No dry mouth.  No sore throat or cough.  No chest pains or palpitations.  No shortness of breath, dyspnea on exertion or wheezing.  No hemotpysis.  No abdominal pain, GERD symptoms, diarrhea  or constipation.  No urinary complaints.  No numbness, tingling or weakness.  No rashes.    All other ROS was reviewed and negative except as above         --------------------------------------------------------------------------------------------------------    Past Medical History    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    Have issues with eating rice and other foods    Closed fracture of right inferior pubic ramus ([facility]) 12/08/2019    Closed fracture of right superior pubic ramus ([facility]) 12/08/2019    Colon polyp     Diabetes mellitus ([facility]) TESTING IN MAY 2019    FAMILY HISTORY    Dysphagia     GERD (gastroesophageal reflux disease)     Headache(784.0) HAVE HAD SEVERAL HEADACHES    THOUGHT IT WAS ALLERGIES OR DIABETES RELATED    Hernia 2019    Discovered after falling    Irritable bowel syndrome 2/7/1999    Had a lot of stress at the time    Shingles     Vertigo     LAST ASSESSED 3/8/13; RESOLVED 11/10/15    Visual impairment SMALL CHANGES IN EYESIGHT    HAD VISION EXAM. SMALL CHANGES IN VISION           Past Surgical History    Past Surgical History:   Procedure Laterality Date    ABDOMINAL SURGERY      BLADDER SUSPENSION      BUNIONECTOMY Right     with implants    COLONOSCOPY      Approx 10 years sfo Dr Ansari    EGD      EYE SURGERY  EYE LID SURGERY IN PAST YEAR    HAD GROWTH ON EYELID AFFECTING EYESIGHT    FLEXIBLE SIGMOIDOSCOPY  1999    Disgnosed with IBS    HERNIA REPAIR  2021    Had hernia repaired    TOTAL ABDOMINAL HYSTERECTOMY  2006    WITH REMOVAL OF BOTH OVARIES     UPPER GASTROINTESTINAL ENDOSCOPY      Approx 10 yrs ago Dr Ansari.  Also had esophagus stretched           Family History    Family History   Problem Relation Age of Onset    Cancer Mother         Multiple Myeloma for 20 yrs    Diabetes Father     Melanoma Father     Prostate cancer Father      Diabetes Brother         Diabetes    Hypertension Brother     Diabetes Brother         Diabetes            Social History    Social History     Tobacco Use    Smoking status: Never    Smokeless tobacco: Never   Vaping Use    Vaping status: Never Used   Substance Use Topics    Alcohol use: Yes     Alcohol/week: 1.0 standard drink of alcohol     Types: 1 Cans of beer per week     Comment: Not even 1 beer a week. I probably drink a case a year.    Drug use: No     Works water treatment     Allergies    Allergies   Allergen Reactions    Sulfa Antibiotics Other (See Comments)     migraines    Sulfasalazine Other (See Comments)     migraines         Medications    Current Outpatient Medications   Medication Instructions    amoxicillin-clavulanate (AUGMENTIN) 875-125 mg per tablet     Cetirizine HCl 10 MG CAPS 1 tablet, Daily at bedtime    Diclofenac Sodium (VOLTAREN) 2 g, 4 times daily    estradiol (MENOSTAR) 14 MCG/24HR PLACE 1 PATCH ON THE SKIN ONCE WEEKLY    fexofenadine-pseudoephedrine (Allegra-D Allergy & Congestion)  MG per tablet Take by mouth    fluticasone (FLONASE) 50 mcg/act nasal spray 2 sprays, Daily    Melatonin 10 MG TABS Take by mouth 2- 5mg tablets a night    Melatonin 10-10 MG TBCR     Melatonin 300 MCG TABS Take by mouth    metroNIDAZOLE (METROCREAM) 0.75 % cream Topical, 2 times daily    Multiple Vitamin (MULTIVITAMIN ADULT PO) Daily    Vitamin D, Cholecalciferol, 50 MCG (2000 UT) CAPS Take by mouth        ________________________________________________________________________          Results Review    DXA 5/24      Study Result    Narrative & Impression   CENTRAL  DXA SCAN     CLINICAL HISTORY:   64 year old post-menopausal  female risk factors include osteoporosis screening. Additional medical history: Premature menopause, surgical, age 46. Hormone therapy 2006 to present time.     TECHNIQUE: Bone densitometry was performed using a Holo[employer] Horizon W bone densitometer.  Regions of  "interest appear properly placed. There are no obvious fractures or other confounding variables which could limit the study.     COMPARISON: Baseline     RESULTS:  LUMBAR SPINE:  L1-L4:  BMD 0.658 gm/cm2  T-score -3.5, osteoporosis.  Z-score -2.2     LEFT TOTAL HIP:  BMD 0.808 gm/cm2  T-score -1.1  Z-score 0.0     LEFT FEMORAL NECK:  BMD 0.652 gm/cm2  T-score -1.8  Z-score -0.4     A 25-hydroxy vitamin D level, an intact PTH, and a comprehensive metabolic panel are suggested in this patient.            Objective   /72   Pulse 92   Ht 5' 7\" (1.702 m)   Wt 74.8 kg (165 lb)   SpO2 95%   BMI 25.84 kg/m²      Physical Exam    GEN: AAO, No apparent distress.  Patient is well developed.  HEENT:  Pupils are equal, round and reactive.  Sclera are clear.  Fundoscopic exam is normal.  External ears are without lesions.  Oral pharynx is clear of ulcers or other lesions.  MMM.   NECK:  Supple.  There is no adenopathy appreciable in anterior or posterior cervical chains or supraclavicularly.  JVP is normal.    HEART: Regular rate and rhythm.  There is no appreciable murmur, gallop or rub.  LUNGS: Clear to auscultation.  ABD:  Soft, without tenderness, rebound or guarding.  No appreciable organomegally.  NEURO: Speech and cognition are normal.  Strength is 5/5 throughout.  Tone is normal.  DTRs are 2/4 at the knees, ankles and elbows.  Gait is normal.  SKIN: There are no rashes or lesions    MUSCULOSKELETAL:   No synovitis noted      "

## 2025-01-28 NOTE — TELEPHONE ENCOUNTER
Called [facility] and through the automated service was given these benefits for    90% Co-insurance   No Auth required   Reference number: [id number]  Information will be faxed to me for more review.

## 2025-01-31 RX ORDER — SODIUM CHLORIDE 9 MG/ML
20 INJECTION, SOLUTION INTRAVENOUS ONCE
OUTPATIENT
Start: 2025-02-14

## 2025-01-31 RX ORDER — ZOLEDRONIC ACID 0.05 MG/ML
5 INJECTION, SOLUTION INTRAVENOUS ONCE
OUTPATIENT
Start: 2025-02-14

## 2025-01-31 NOTE — TELEPHONE ENCOUNTER
"Received call from patient. Explained to patient that her benefits are     She also has a deductible to meet before she pays 10%. I'm not sure how many treatments she will need. Her first treatment she will need to meet her deductible so it will be approximately $528. Her second will be approximately $142.78. Each following estimate is $52.78. \" Patient is agreeable to paying the $528 for her infusion. Patient would like to go to [facility] on [facility], Friday afternoons. Advised patient that I will the infusion center to get her scheduled and then I will give her a callback with the details        "

## 2025-02-08 ENCOUNTER — APPOINTMENT (OUTPATIENT)
Dept: LAB | Facility: MEDICAL CENTER | Age: 65
End: 2025-02-08
Payer: COMMERCIAL

## 2025-02-08 DIAGNOSIS — M81.0 AGE-RELATED OSTEOPOROSIS WITHOUT CURRENT PATHOLOGICAL FRACTURE: ICD-10-CM

## 2025-02-08 DIAGNOSIS — E78.2 MIXED HYPERLIPIDEMIA: ICD-10-CM

## 2025-02-08 LAB
ALBUMIN SERPL BCG-MCNC: 4.2 G/DL (ref 3.5–5)
ALP SERPL-CCNC: 82 U/L (ref 34–104)
ALT SERPL W P-5'-P-CCNC: 14 U/L (ref 7–52)
ANION GAP SERPL CALCULATED.3IONS-SCNC: 6 MMOL/L (ref 4–13)
AST SERPL W P-5'-P-CCNC: 17 U/L (ref 13–39)
BILIRUB SERPL-MCNC: 0.47 MG/DL (ref 0.2–1)
BUN SERPL-MCNC: 14 MG/DL (ref 5–25)
CALCIUM SERPL-MCNC: 9.4 MG/DL (ref 8.4–10.2)
CHLORIDE SERPL-SCNC: 105 MMOL/L (ref 96–108)
CHOLEST SERPL-MCNC: 217 MG/DL (ref ?–200)
CO2 SERPL-SCNC: 30 MMOL/L (ref 21–32)
CREAT SERPL-MCNC: 0.89 MG/DL (ref 0.6–1.3)
GFR SERPL CREATININE-BSD FRML MDRD: 69 ML/MIN/1.73SQ M
GLUCOSE P FAST SERPL-MCNC: 95 MG/DL (ref 65–99)
HDLC SERPL-MCNC: 65 MG/DL
LDLC SERPL CALC-MCNC: 138 MG/DL (ref 0–100)
NONHDLC SERPL-MCNC: 152 MG/DL
POTASSIUM SERPL-SCNC: 3.9 MMOL/L (ref 3.5–5.3)
PROT SERPL-MCNC: 6.3 G/DL (ref 6.4–8.4)
SODIUM SERPL-SCNC: 141 MMOL/L (ref 135–147)
TRIGL SERPL-MCNC: 70 MG/DL (ref ?–150)
TSH SERPL DL<=0.05 MIU/L-ACNC: 1.12 UIU/ML (ref 0.45–4.5)

## 2025-02-08 PROCEDURE — 84443 ASSAY THYROID STIM HORMONE: CPT

## 2025-02-08 PROCEDURE — 80053 COMPREHEN METABOLIC PANEL: CPT

## 2025-02-08 PROCEDURE — 36415 COLL VENOUS BLD VENIPUNCTURE: CPT

## 2025-02-08 PROCEDURE — 86334 IMMUNOFIX E-PHORESIS SERUM: CPT

## 2025-02-08 PROCEDURE — 80061 LIPID PANEL: CPT

## 2025-02-08 PROCEDURE — 84165 PROTEIN E-PHORESIS SERUM: CPT

## 2025-02-11 ENCOUNTER — RESULTS FOLLOW-UP (OUTPATIENT)
Dept: FAMILY MEDICINE CLINIC | Facility: CLINIC | Age: 65
End: 2025-02-11

## 2025-02-11 LAB
ALBUMIN SERPL ELPH-MCNC: 4.16 G/DL (ref 3.2–5.1)
ALBUMIN SERPL ELPH-MCNC: 66 % (ref 48–70)
ALPHA1 GLOB SERPL ELPH-MCNC: 0.26 G/DL (ref 0.15–0.47)
ALPHA1 GLOB SERPL ELPH-MCNC: 4.2 % (ref 1.8–7)
ALPHA2 GLOB SERPL ELPH-MCNC: 0.62 G/DL (ref 0.42–1.04)
ALPHA2 GLOB SERPL ELPH-MCNC: 9.8 % (ref 5.9–14.9)
BETA GLOB ABNORMAL SERPL ELPH-MCNC: 0.42 G/DL (ref 0.31–0.57)
BETA1 GLOB SERPL ELPH-MCNC: 6.7 % (ref 4.7–7.7)
BETA2 GLOB SERPL ELPH-MCNC: 4.2 % (ref 3.1–7.9)
BETA2+GAMMA GLOB SERPL ELPH-MCNC: 0.26 G/DL (ref 0.2–0.58)
GAMMA GLOB ABNORMAL SERPL ELPH-MCNC: 0.57 G/DL (ref 0.4–1.66)
GAMMA GLOB SERPL ELPH-MCNC: 9.1 % (ref 6.9–22.3)
IGG/ALB SER: 1.94 {RATIO} (ref 1.1–1.8)
INTERPRETATION UR IFE-IMP: NORMAL
PROT PATTERN SERPL ELPH-IMP: ABNORMAL
PROT SERPL-MCNC: 6.3 G/DL (ref 6.4–8.2)

## 2025-02-11 PROCEDURE — 84165 PROTEIN E-PHORESIS SERUM: CPT | Performed by: STUDENT IN AN ORGANIZED HEALTH CARE EDUCATION/TRAINING PROGRAM

## 2025-02-11 PROCEDURE — 86334 IMMUNOFIX E-PHORESIS SERUM: CPT | Performed by: STUDENT IN AN ORGANIZED HEALTH CARE EDUCATION/TRAINING PROGRAM

## 2025-02-13 ENCOUNTER — TELEPHONE (OUTPATIENT)
Dept: INFUSION CENTER | Facility: CLINIC | Age: 65
End: 2025-02-13

## 2025-02-13 NOTE — TELEPHONE ENCOUNTER
Patient called and left message reviewing new patient info before tomorrow's 1230 appointment: Amenities, appointment time, visitor policy, and call back number.

## 2025-02-14 ENCOUNTER — TELEPHONE (OUTPATIENT)
Age: 65
End: 2025-02-14

## 2025-02-14 ENCOUNTER — HOSPITAL ENCOUNTER (OUTPATIENT)
Dept: INFUSION CENTER | Facility: CLINIC | Age: 65
End: 2025-02-14
Payer: COMMERCIAL

## 2025-02-14 VITALS
RESPIRATION RATE: 18 BRPM | HEART RATE: 81 BPM | TEMPERATURE: 97.7 F | DIASTOLIC BLOOD PRESSURE: 81 MMHG | SYSTOLIC BLOOD PRESSURE: 142 MMHG

## 2025-02-14 DIAGNOSIS — M81.0 AGE-RELATED OSTEOPOROSIS WITHOUT CURRENT PATHOLOGICAL FRACTURE: Primary | ICD-10-CM

## 2025-02-14 PROCEDURE — 96365 THER/PROPH/DIAG IV INF INIT: CPT

## 2025-02-14 RX ORDER — SODIUM CHLORIDE 9 MG/ML
20 INJECTION, SOLUTION INTRAVENOUS ONCE
Status: COMPLETED | OUTPATIENT
Start: 2025-02-14 | End: 2025-02-14

## 2025-02-14 RX ORDER — ZOLEDRONIC ACID 0.05 MG/ML
5 INJECTION, SOLUTION INTRAVENOUS ONCE
OUTPATIENT
Start: 2026-02-08

## 2025-02-14 RX ORDER — ZOLEDRONIC ACID 0.05 MG/ML
5 INJECTION, SOLUTION INTRAVENOUS ONCE
Status: COMPLETED | OUTPATIENT
Start: 2025-02-14 | End: 2025-02-14

## 2025-02-14 RX ORDER — SODIUM CHLORIDE 9 MG/ML
20 INJECTION, SOLUTION INTRAVENOUS ONCE
OUTPATIENT
Start: 2026-02-08

## 2025-02-14 RX ADMIN — SODIUM CHLORIDE 20 ML/HR: 0.9 INJECTION, SOLUTION INTRAVENOUS at 14:04

## 2025-02-14 RX ADMIN — ZOLEDRONIC ACID 5 MG: 0.05 INJECTION, SOLUTION INTRAVENOUS at 14:04

## 2025-02-14 NOTE — PROGRESS NOTES
Pt presents for reclast. Ca 9.4, CrCl 55.5, pt denies any recent or upcoming dental work. Pt tolerated treatment without incident. Pt declined AVS, no future appts with infusion at this time. Pt will f/u with provider if dose is needed next year.

## 2025-03-25 ENCOUNTER — OFFICE VISIT (OUTPATIENT)
Dept: FAMILY MEDICINE CLINIC | Facility: CLINIC | Age: 65
End: 2025-03-25
Payer: COMMERCIAL

## 2025-03-25 ENCOUNTER — APPOINTMENT (OUTPATIENT)
Dept: LAB | Facility: HOSPITAL | Age: 65
End: 2025-03-25
Payer: COMMERCIAL

## 2025-03-25 ENCOUNTER — RESULTS FOLLOW-UP (OUTPATIENT)
Dept: FAMILY MEDICINE CLINIC | Facility: CLINIC | Age: 65
End: 2025-03-25

## 2025-03-25 ENCOUNTER — TELEPHONE (OUTPATIENT)
Age: 65
End: 2025-03-25

## 2025-03-25 ENCOUNTER — HOSPITAL ENCOUNTER (OUTPATIENT)
Dept: CT IMAGING | Facility: HOSPITAL | Age: 65
Discharge: HOME | End: 2025-03-25
Payer: COMMERCIAL

## 2025-03-25 VITALS
TEMPERATURE: 96.8 F | HEART RATE: 86 BPM | DIASTOLIC BLOOD PRESSURE: 86 MMHG | BODY MASS INDEX: 25.69 KG/M2 | SYSTOLIC BLOOD PRESSURE: 140 MMHG | OXYGEN SATURATION: 99 % | WEIGHT: 164 LBS

## 2025-03-25 DIAGNOSIS — R31.0 GROSS HEMATURIA: ICD-10-CM

## 2025-03-25 DIAGNOSIS — Z87.442 HISTORY OF RENAL CALCULI: ICD-10-CM

## 2025-03-25 DIAGNOSIS — R31.9 HEMATURIA, UNSPECIFIED TYPE: Primary | ICD-10-CM

## 2025-03-25 LAB
ANION GAP SERPL CALCULATED.3IONS-SCNC: 8 MMOL/L (ref 4–13)
BASOPHILS # BLD AUTO: 0.06 THOUSANDS/ÂΜL (ref 0–0.1)
BASOPHILS NFR BLD AUTO: 1 % (ref 0–1)
BILIRUB UR QL STRIP: NEGATIVE
BUN SERPL-MCNC: 15 MG/DL (ref 5–25)
CALCIUM SERPL-MCNC: 9.7 MG/DL (ref 8.4–10.2)
CHLORIDE SERPL-SCNC: 101 MMOL/L (ref 96–108)
CLARITY UR: CLEAR
CO2 SERPL-SCNC: 30 MMOL/L (ref 21–32)
COLOR UR: COLORLESS
CREAT SERPL-MCNC: 0.81 MG/DL (ref 0.6–1.3)
EOSINOPHIL # BLD AUTO: 0.4 THOUSAND/ÂΜL (ref 0–0.61)
EOSINOPHIL NFR BLD AUTO: 8 % (ref 0–6)
ERYTHROCYTE [DISTWIDTH] IN BLOOD BY AUTOMATED COUNT: 13.1 % (ref 11.6–15.1)
GFR SERPL CREATININE-BSD FRML MDRD: 77 ML/MIN/1.73SQ M
GLUCOSE SERPL-MCNC: 104 MG/DL (ref 65–140)
GLUCOSE UR STRIP-MCNC: NEGATIVE MG/DL
HCT VFR BLD AUTO: 42 % (ref 34.8–46.1)
HGB BLD-MCNC: 13.7 G/DL (ref 11.5–15.4)
HGB UR QL STRIP.AUTO: NEGATIVE
IMM GRANULOCYTES # BLD AUTO: 0.01 THOUSAND/UL (ref 0–0.2)
IMM GRANULOCYTES NFR BLD AUTO: 0 % (ref 0–2)
KETONES UR STRIP-MCNC: NEGATIVE MG/DL
LEUKOCYTE ESTERASE UR QL STRIP: NEGATIVE
LYMPHOCYTES # BLD AUTO: 1.66 THOUSANDS/ÂΜL (ref 0.6–4.47)
LYMPHOCYTES NFR BLD AUTO: 33 % (ref 14–44)
MCH RBC QN AUTO: 30.6 PG (ref 26.8–34.3)
MCHC RBC AUTO-ENTMCNC: 32.6 G/DL (ref 31.4–37.4)
MCV RBC AUTO: 94 FL (ref 82–98)
MONOCYTES # BLD AUTO: 0.35 THOUSAND/ÂΜL (ref 0.17–1.22)
MONOCYTES NFR BLD AUTO: 7 % (ref 4–12)
NEUTROPHILS # BLD AUTO: 2.62 THOUSANDS/ÂΜL (ref 1.85–7.62)
NEUTS SEG NFR BLD AUTO: 51 % (ref 43–75)
NITRITE UR QL STRIP: NEGATIVE
NRBC BLD AUTO-RTO: 0 /100 WBCS
PH UR STRIP.AUTO: 6.5 [PH]
PLATELET # BLD AUTO: 219 THOUSANDS/UL (ref 149–390)
PMV BLD AUTO: 8.9 FL (ref 8.9–12.7)
POTASSIUM SERPL-SCNC: 4 MMOL/L (ref 3.5–5.3)
PROT UR STRIP-MCNC: NEGATIVE MG/DL
RBC # BLD AUTO: 4.48 MILLION/UL (ref 3.81–5.12)
SL AMB  POCT GLUCOSE, UA: ABNORMAL
SL AMB LEUKOCYTE ESTERASE,UA: 15
SL AMB POCT BILIRUBIN,UA: ABNORMAL
SL AMB POCT BLOOD,UA: ABNORMAL
SL AMB POCT CLARITY,UA: ABNORMAL
SL AMB POCT COLOR,UA: YELLOW
SL AMB POCT KETONES,UA: ABNORMAL
SL AMB POCT NITRITE,UA: ABNORMAL
SL AMB POCT PH,UA: 6.5
SL AMB POCT SPECIFIC GRAVITY,UA: 1000
SL AMB POCT URINE PROTEIN: 15
SL AMB POCT UROBILINOGEN: 0.2
SODIUM SERPL-SCNC: 139 MMOL/L (ref 135–147)
SP GR UR STRIP.AUTO: 1.01 (ref 1–1.03)
UROBILINOGEN UR STRIP-ACNC: <2 MG/DL
WBC # BLD AUTO: 5.1 THOUSAND/UL (ref 4.31–10.16)

## 2025-03-25 PROCEDURE — 80048 BASIC METABOLIC PNL TOTAL CA: CPT

## 2025-03-25 PROCEDURE — 99214 OFFICE O/P EST MOD 30 MIN: CPT | Performed by: FAMILY MEDICINE

## 2025-03-25 PROCEDURE — 74178 CT ABD&PLV WO CNTR FLWD CNTR: CPT

## 2025-03-25 PROCEDURE — 81003 URINALYSIS AUTO W/O SCOPE: CPT | Performed by: FAMILY MEDICINE

## 2025-03-25 PROCEDURE — 81002 URINALYSIS NONAUTO W/O SCOPE: CPT | Performed by: FAMILY MEDICINE

## 2025-03-25 PROCEDURE — 87086 URINE CULTURE/COLONY COUNT: CPT | Performed by: FAMILY MEDICINE

## 2025-03-25 PROCEDURE — 36415 COLL VENOUS BLD VENIPUNCTURE: CPT

## 2025-03-25 PROCEDURE — 85025 COMPLETE CBC W/AUTO DIFF WBC: CPT

## 2025-03-25 RX ADMIN — IOHEXOL 90 ML: 350 INJECTION, SOLUTION INTRAVENOUS at 12:48

## 2025-03-25 NOTE — TELEPHONE ENCOUNTER
New Patient    Appointment Scheduling  What office location does the patient prefer?: [place]  What is the reason for the patient's appointment?: Gross hematuria, bladder calculus found in CT Scan  Have patient records been requested?: N  If No, are the records showing in Epic: Y    Appointment Details  Date: 3/26  Time:    11 AM  Location:   [place]  Provider:  Shelly  Does the appointment need further review? (Reason)      HISTORY  Is the patient having active symptoms? If so, describe symptoms:  Has the patient had any previous Urologist(s)?: Saw someone in the 90's for Kidney stone.   Was the patient seen in the ED?:N  Has the patient had any outside testing done?:N  Does patient have Imaging/Lab Results:Yes, Ct Scan in Chart.  Have you had Cancer: Unk    INSURANCE   Have you confirmed Patient's insurance? Y  Is the insurance accepted?  Y  Is the insurance active? Y

## 2025-03-25 NOTE — PATIENT INSTRUCTIONS
Strain urine for possible stone    Try to follow a kidney stone friendly diet and increase fluid intake  See attached information    Go for STAT:  Labs and CT scan today  And   Also refer to urology    If bleeding returns and is severe - go to ER.

## 2025-03-25 NOTE — PROGRESS NOTES
Name: Karen Goins      : 1960      MRN: [id number]  Encounter Provider: Jaqueline Trevino DO  Encounter Date: 3/25/2025   Encounter department: [facility]  :  Assessment & Plan  Hematuria, unspecified type    Orders:    POCT urine dip    Gross hematuria  Unknown etiology - will be getting workup  And f/u with uro for cystoscopy  Differentials:  UTI  Kidney stones - h/o prior and strong family history  Not on asa or nsaids  Tumor,...    Orders:    CT renal protocol; Future    Basic metabolic panel; Future    CBC and differential; Future    Ambulatory Referral to Urology; Future    UA (URINE) with reflex to Scope    Urine culture    History of renal calculi  Personal as well as her father and 2 brothers  Did increase citracal/calcium recently for osteoporosis.    Note - bp elevated today  Suspect due to concern over reason for visit.         Patient Instructions   Strain urine for possible stone    Try to follow a kidney stone friendly diet and increase fluid intake  See attached information    Go for STAT:  Labs and CT scan today  And   Also refer to urology    If bleeding returns and is severe - go to ER.             History of Present Illness   Blood in Urine  Irritative symptoms do not include frequency or urgency. Associated symptoms include nausea. Pertinent negatives include no abdominal pain, dysuria, fever or vomiting.     Pt presents today to discuss bright red blood in urine X 2 days ago  H/o kidney stones   And had gross hematuria with those as well    Prior to onset of above - pt was walking dog and developed lower abdominal and hip and low back pain  Went into the bathroom - and saw blood (bright red)  No other unusual bleeding - nose, vagina, stools,or unusual bleeding or bruising    Pt continued with gross hematuria for several more episodes.      Review of Systems   Constitutional:  Negative for fatigue and fever.        Not feeling great since hematuria      HENT:  Positive for nosebleeds.    Respiratory:  Negative for shortness of breath.    Cardiovascular:  Negative for chest pain and palpitations.   Gastrointestinal:  Positive for nausea. Negative for abdominal pain and vomiting.        Ad some nausea last night but no vomiting    Abdominal pain - none since last episode of gross hematuria - day of onset - around noon - X 2 days ago    Still having some hip and low back pain    No h/o tob use.     Genitourinary:  Positive for hematuria. Negative for dysuria, frequency and urgency.        Denies vaginal bleeding     Neurological:  Negative for syncope.       Objective   /86   Pulse 86   Temp (!) 96.8 °F (36 °C)   Wt 74.4 kg (164 lb)   SpO2 99%   BMI 25.69 kg/m²      Physical Exam  Vitals and nursing note reviewed.   Constitutional:       General: She is not in acute distress.     Appearance: Normal appearance. She is not ill-appearing or toxic-appearing.      Comments: Nontoxic appearing  Good facial coloring     HENT:      Mouth/Throat:      Mouth: Mucous membranes are moist.      Comments: Mucous membranes moist and pink        Eyes:      General: No scleral icterus.     Extraocular Movements: Extraocular movements intact.      Pupils: Pupils are equal, round, and reactive to light.      Comments: Good conjunctival coloring     Cardiovascular:      Rate and Rhythm: Normal rate and regular rhythm.      Heart sounds: Normal heart sounds. No murmur heard.  Pulmonary:      Effort: Pulmonary effort is normal. No respiratory distress.      Breath sounds: Normal breath sounds. No wheezing, rhonchi or rales.   Abdominal:      General: There is no distension.      Palpations: Abdomen is soft. There is no mass.      Tenderness: There is no abdominal tenderness. There is no right CVA tenderness, left CVA tenderness, guarding or rebound.      Hernia: No hernia is present.      Comments: No real tenderness  No peritoneal signs  No masses     Skin:     General: Skin  is warm.      Coloration: Skin is not jaundiced.   Neurological:      General: No focal deficit present.      Mental Status: She is alert and oriented to person, place, and time.   Psychiatric:         Mood and Affect: Mood normal.         Behavior: Behavior normal.         Thought Content: Thought content normal.         Judgment: Judgment normal.

## 2025-03-25 NOTE — ASSESSMENT & PLAN NOTE
Personal as well as her father and 2 brothers  Did increase citracal/calcium recently for osteoporosis.    Note - bp elevated today  Suspect due to concern over reason for visit.         Patient Instructions   Strain urine for possible stone    Try to follow a kidney stone friendly diet and increase fluid intake  See attached information    Go for STAT:  Labs and CT scan today  And   Also refer to urology    If bleeding returns and is severe - go to ER.

## 2025-03-25 NOTE — PROGRESS NOTES
Name: Karen Goins      : 1960      MRN: [id number]  Encounter Provider: MORGAN Emanuel  Encounter Date: 3/26/2025   Encounter [facility]  :  Assessment & Plan  Gross hematuria  CT urogram 3/25/25- 1cm bladder calculus   UA 3/25/25 negative for infection or blood   She has a history of kidney stones about 25 years ago with stent placement   She reports recently having gross hematuria and pelvic pain prompting her CT imaging   She denies ongoing hematuria   Case request placed for cystolitholapaxy & cystoscopy   Consent signed in OR  Urine culture pending   Increase fluid intake and flomax sent   Orders:    Ambulatory Referral to Urology    POCT urine dip auto non-scope    Bladder stone  See above documentation   Orders:    Case request operating room: CYSTOSCOPY FLEXIBLE, Cystolitholapaxy; Standing    POCT urine dip auto non-scope    tamsulosin (FLOMAX) 0.4 mg; Take 1 capsule (0.4 mg total) by mouth daily with dinner        History of Present Illness   Karen Goins is a 65 y.o. female who presents in consultation for gross hematuria, referred to our office by her PCP, Dr. Trevino. She was seen by her PCP on 3/25/25 with complaints of gross hematuria a few days ago. She reports previously having kidney stones >20 years ago during pregnancy requiring surgical intervention. Her PCP sent her for a CT urogram on 3/25/25 which showed a 1cm bladder calculus. No evidence of kidney/ureteral calculi or lesions. She is no longer having gross hematuria. She does report pelvic pain and general malaise as well as migraines. She denies difficulty voiding, dysuria or burning.        Review of Systems   Constitutional:  Positive for fatigue. Negative for chills, diaphoresis and fever.        Headache   Respiratory:  Negative for cough and shortness of breath.    Cardiovascular:  Negative for chest pain and palpitations.   Gastrointestinal:  Negative for abdominal  pain, nausea and vomiting.   Genitourinary:  Positive for pelvic pain. Negative for decreased urine volume, difficulty urinating, dysuria, flank pain, frequency, hematuria and urgency.   Musculoskeletal:  Negative for back pain, gait problem and myalgias.   Skin:  Negative for pallor and wound.   Neurological:  Negative for dizziness, weakness, light-headedness and numbness.       Pertinent Medical History          Medical History Reviewed by provider this encounter:     .  Past Medical History   Past Medical History:   Diagnosis Date    Allergic HAVE HAD FOR 30 YRS    SEASONAL ALLERGIES    Allergic rhinitis     Gets immunotherapy with Dr. Carolina    Sheehan esophagus A long time ago    Have issues with eating rice and other foods    Closed fracture of right inferior pubic ramus (HCC) 12/08/2019    Closed fracture of right superior pubic ramus (HCC) 12/08/2019    Colon polyp     Diabetes mellitus ([facility]) TESTING IN MAY 2019    FAMILY HISTORY    Dysphagia     GERD (gastroesophageal reflux disease)     Headache(784.0) HAVE HAD SEVERAL HEADACHES    THOUGHT IT WAS ALLERGIES OR DIABETES RELATED    Hernia 2019    Discovered after falling    Irritable bowel syndrome 2/7/1999    Had a lot of stress at the time    Shingles     Vertigo     LAST ASSESSED 3/8/13; RESOLVED 11/10/15    Visual impairment SMALL CHANGES IN EYESIGHT    HAD VISION EXAM. SMALL CHANGES IN VISION     Past Surgical History:   Procedure Laterality Date    ABDOMINAL SURGERY      BLADDER SUSPENSION      BUNIONECTOMY Right     with implants    COLONOSCOPY      Approx 10 years sfo Dr Ansari    EGD      EYE SURGERY  EYE LID SURGERY IN PAST YEAR    HAD GROWTH ON EYELID AFFECTING EYESIGHT    FLEXIBLE SIGMOIDOSCOPY  1999    Disgnosed with IBS    HERNIA REPAIR  2021    Had hernia repaired    TOTAL ABDOMINAL HYSTERECTOMY  2006    WITH REMOVAL OF BOTH OVARIES     UPPER GASTROINTESTINAL ENDOSCOPY      Approx 10 yrs ago Dr Ansari.  Also had esophagus stretched     Family  History   Problem Relation Age of Onset    Diabetes Father     Melanoma Father     Prostate cancer Father     Cancer Mother         Multiple Myeloma for 20 yrs    Diabetes Brother         Diabetes    Hypertension Brother     Diabetes Brother         Diabetes      reports that she has never smoked. She has never used smokeless tobacco. She reports current alcohol use of about 1.0 standard drink of alcohol per week. She reports that she does not use drugs.  Current Outpatient Medications   Medication Instructions    Cetirizine HCl 10 MG CAPS 1 tablet, Daily at bedtime    estradiol (MENOSTAR) 14 MCG/24HR PLACE 1 PATCH ON THE SKIN ONCE WEEKLY    fexofenadine-pseudoephedrine (Allegra-D Allergy & Congestion)  MG per tablet Take by mouth    fluticasone (FLONASE) 50 mcg/act nasal spray 2 sprays, Daily    Melatonin 10-10 MG TBCR     Multiple Vitamin (MULTIVITAMIN ADULT PO) Daily    tamsulosin (FLOMAX) 0.4 mg, Oral, Daily with dinner    Vitamin D, Cholecalciferol, 50 MCG (2000 UT) CAPS Take by mouth     Allergies   Allergen Reactions    Sulfa Antibiotics Other (See Comments)     migraines    Sulfasalazine Other (See Comments)     migraines      Current Outpatient Medications on File Prior to Visit   Medication Sig Dispense Refill    Cetirizine HCl 10 MG CAPS Take 1 tablet by mouth daily at bedtime       estradiol (MENOSTAR) 14 MCG/24HR PLACE 1 PATCH ON THE SKIN ONCE WEEKLY      fexofenadine-pseudoephedrine (Allegra-D Allergy & Congestion)  MG per tablet Take by mouth      fluticasone (FLONASE) 50 mcg/act nasal spray 2 sprays into each nostril daily      Melatonin 10-10 MG TBCR       Multiple Vitamin (MULTIVITAMIN ADULT PO) Take by mouth in the morning      Vitamin D, Cholecalciferol, 50 MCG (2000 UT) CAPS Take by mouth      [DISCONTINUED] Melatonin 10 MG TABS Take by mouth 2- 5mg tablets a night       Current Facility-Administered Medications on File Prior to Visit   Medication Dose Route Frequency Provider Last  "Rate Last Admin    [COMPLETED] iohexol (OMNIPAQUE) 350 MG/ML injection (MULTI-DOSE) 90 mL  90 mL Intravenous Once in imaging Jaqueline Trevino, DO   90 mL at 03/25/25 1248      Social History     Tobacco Use    Smoking status: Never    Smokeless tobacco: Never   Vaping Use    Vaping status: Never Used   Substance and Sexual Activity    Alcohol use: Yes     Alcohol/week: 1.0 standard drink of alcohol     Types: 1 Cans of beer per week     Comment: Not even 1 beer a week. I probably drink a case a year.    Drug use: No    Sexual activity: Not Currently     Partners: Male     Birth control/protection: Surgical     Comment: Hysterectomy        Objective   /60 (BP Location: Left arm, Patient Position: Sitting, Cuff Size: Adult)   Pulse 100   Resp 20   Ht 5' 7\" (1.702 m)   Wt 74.4 kg (164 lb)   SpO2 96%   BMI 25.69 kg/m²     Physical Exam  Constitutional:       Appearance: Normal appearance.   HENT:      Head: Normocephalic and atraumatic.   Eyes:      Conjunctiva/sclera: Conjunctivae normal.   Pulmonary:      Effort: Pulmonary effort is normal. No respiratory distress.   Musculoskeletal:         General: Normal range of motion.      Cervical back: Normal range of motion.   Skin:     General: Skin is warm and dry.   Neurological:      General: No focal deficit present.      Mental Status: She is alert and oriented to person, place, and time.   Psychiatric:         Mood and Affect: Mood normal.         Behavior: Behavior normal.          Results   Lab Results   Component Value Date    GLUCOSE 88 11/10/2015    CALCIUM 9.7 03/25/2025     11/10/2015    K 4.0 03/25/2025    CO2 30 03/25/2025     03/25/2025    BUN 15 03/25/2025    CREATININE 0.81 03/25/2025     Lab Results   Component Value Date    WBC 5.10 03/25/2025    HGB 13.7 03/25/2025    HCT 42.0 03/25/2025    MCV 94 03/25/2025     03/25/2025       Office Urine Dip  Recent Results (from the past hour)   POCT urine dip auto non-scope    " Collection Time: 03/26/25 11:29 AM   Result Value Ref Range     COLOR,UA yellow     CLARITY,UA clear     SPECIFIC GRAVITY,UA 1.005      PH,UA 6.0     LEUKOCYTE ESTERASE,UA trace     NITRITE,UA neg     GLUCOSE, UA neg     KETONES,UA neg     BILIRUBIN,UA eg     BLOOD,UA neg     POCT URINE PROTEIN neg     SL AMB POCT UROBILINOGEN 0.2

## 2025-03-25 NOTE — RESULT ENCOUNTER NOTE
Hi marixa  Your labs are normal  The CT scan does not show any stones in the ureter but there is an area in the bladder which may represent a stone  So it is possible that you may have passed a stone - or a piece of one  But may still have some material to pass.  Await urine culture as well  And   Still urge followup with urology - as I  see that you have scheduled for tomorrow.    Thanks  Dr ruiz

## 2025-03-26 ENCOUNTER — OFFICE VISIT (OUTPATIENT)
Dept: UROLOGY | Facility: MEDICAL CENTER | Age: 65
End: 2025-03-26
Payer: COMMERCIAL

## 2025-03-26 ENCOUNTER — PREP FOR PROCEDURE (OUTPATIENT)
Dept: UROLOGY | Facility: MEDICAL CENTER | Age: 65
End: 2025-03-26

## 2025-03-26 VITALS
DIASTOLIC BLOOD PRESSURE: 60 MMHG | BODY MASS INDEX: 25.74 KG/M2 | HEIGHT: 67 IN | WEIGHT: 164 LBS | SYSTOLIC BLOOD PRESSURE: 110 MMHG | RESPIRATION RATE: 20 BRPM | HEART RATE: 100 BPM | OXYGEN SATURATION: 96 %

## 2025-03-26 DIAGNOSIS — R31.0 GROSS HEMATURIA: Primary | ICD-10-CM

## 2025-03-26 DIAGNOSIS — N21.0 BLADDER STONE: ICD-10-CM

## 2025-03-26 LAB
BACTERIA UR CULT: NORMAL
SL AMB  POCT GLUCOSE, UA: ABNORMAL
SL AMB LEUKOCYTE ESTERASE,UA: ABNORMAL
SL AMB POCT BILIRUBIN,UA: ABNORMAL
SL AMB POCT BLOOD,UA: ABNORMAL
SL AMB POCT CLARITY,UA: CLEAR
SL AMB POCT COLOR,UA: YELLOW
SL AMB POCT KETONES,UA: ABNORMAL
SL AMB POCT NITRITE,UA: ABNORMAL
SL AMB POCT PH,UA: 6
SL AMB POCT SPECIFIC GRAVITY,UA: 1
SL AMB POCT URINE PROTEIN: ABNORMAL
SL AMB POCT UROBILINOGEN: 0.2

## 2025-03-26 PROCEDURE — 81003 URINALYSIS AUTO W/O SCOPE: CPT

## 2025-03-26 PROCEDURE — 99204 OFFICE O/P NEW MOD 45 MIN: CPT

## 2025-03-26 RX ORDER — TAMSULOSIN HYDROCHLORIDE 0.4 MG/1
0.4 CAPSULE ORAL
Qty: 21 CAPSULE | Refills: 0 | Status: SHIPPED | OUTPATIENT
Start: 2025-03-26

## 2025-03-26 NOTE — TELEPHONE ENCOUNTER
Patient called stating she received a message from  LT in regards to surgery date. She stated she was in a meeting and just got out. Message relayed as per encounter below      Called this patient and left a voice message asking her to return my phone call. I did ask in my message if she would be available for surgery on 4/4 with Dr Holman. Will schedule once I have a return phone call from this patient     Patient stated that 04/04/25 is a good date for her.      She can be reached at [phone]

## 2025-03-26 NOTE — TELEPHONE ENCOUNTER
Called this patient and left a voice message asking her to return my phone call.  I did ask in my message if she would be available for surgery on 4/4 with Dr Holman.  Will schedule once I have a return phone call from this patient

## 2025-03-28 NOTE — TELEPHONE ENCOUNTER
Late Entry from yesterday 3/27/25    I returned the patient's phone call and had to tell her that the 4/4 date was no longer available, but that we are able to schedule for 4/9, the patient agreed to the new date    Spoke with patient and confirmed surgery date of 4/9/25  Type of surgery: Cystolitholapaxy  Operating physician:Dr. Holman  Location of surgery: [facility]    Verbally went over prep with patient on 3/27/25  NPO  Bowel prep? No  Hospital calls afternoon prior with arrival time (calls Friday afternoon for Monday surgery)  Patient needs ride to and from surgery   outpatient  Pre-op testing to be done 2 weeks prior to surgery. All testing can be done as a walk-in. EKG can only be done as a walk-in at any [facility].  list labs needed-None  Blood thinners:   None  Clearances needed: None    Emailed to patient on 3/28/25  Copy of packet scanned into Media  Labs in packet and in electronic record   Soap prep in packet  post-op in packet     Consent: in media

## 2025-04-04 NOTE — PRE-PROCEDURE INSTRUCTIONS
Pre-Surgery Instructions:   Medication Instructions    Cetirizine HCl 10 MG CAPS Hold day of surgery.    estradiol (MENOSTAR) 14 MCG/24HR Hold day of surgery.    fexofenadine-pseudoephedrine (Allegra-D Allergy & Congestion)  MG per tablet Hold day of surgery.    fluticasone (FLONASE) 50 mcg/act nasal spray Uses PRN- OK to take day of surgery    Melatonin 10-10 MG TBCR Stop taking    Multiple Vitamin (MULTIVITAMIN ADULT PO) Stop taking    tamsulosin (FLOMAX) 0.4 mg Take night before surgery    Vitamin D, Cholecalciferol, 50 MCG (2000 UT) CAPS Stop taking   Medication instructions for day of surgery reviewed. Please take all instructed medications with only a sip of water.       You will receive a call one business day prior to surgery with an arrival time and hospital directions. If your surgery is scheduled on a Monday, the hospital will be calling you on the Friday prior to your surgery. If you have not heard from anyone by 8pm, please call the hospital supervisor through the hospital  at [phone]. ([place] [phone] or [place] [phone]).    Do not eat or drink anything after midnight the night before your surgery, including candy, mints, lifesavers, or chewing gum. Do not drink alcohol 24hrs before your surgery. Try not to smoke at least 24hrs before your surgery.       Follow the pre surgery showering instructions as listed in the “My Surgical Experience Booklet” or otherwise provided by your surgeon's office. Do not use a blade to shave the surgical area 1 week before surgery. It is okay to use a clean electric clippers up to 24 hours before surgery. Do not apply any lotions, creams, including makeup, cologne, deodorant, or perfumes after showering on the day of your surgery. Do not use dry shampoo, hair spray, hair gel, or any type of hair products.     No contact lenses, eye make-up, or artificial eyelashes. Remove nail polish, including gel polish, and any artificial, gel, or  acrylic nails if possible. Remove all jewelry including rings and body piercing jewelry.     Wear causal clothing that is easy to take on and off. Consider your type of surgery.    Keep any valuables, jewelry, piercings at home. Please bring any specially ordered equipment (sling, braces) if indicated.    Arrange for a responsible person to drive you to and from the hospital on the day of your surgery. Please confirm the visitor policy for the day of your procedure when you receive your phone call with an arrival time.     Call the surgeon's office with any new illnesses, exposures, or additional questions prior to surgery.    Please reference your “My Surgical Experience Booklet” for additional information to prepare for your upcoming surgery.

## 2025-04-08 ENCOUNTER — ANESTHESIA EVENT (OUTPATIENT)
Dept: PERIOP | Facility: HOSPITAL | Age: 65
End: 2025-04-08
Payer: COMMERCIAL

## 2025-04-08 DIAGNOSIS — N21.0 BLADDER STONE: ICD-10-CM

## 2025-04-08 RX ORDER — TAMSULOSIN HYDROCHLORIDE 0.4 MG/1
0.4 CAPSULE ORAL
Qty: 21 CAPSULE | Refills: 0 | Status: SHIPPED | OUTPATIENT
Start: 2025-04-08 | End: 2025-04-16 | Stop reason: SDUPTHER

## 2025-04-09 ENCOUNTER — TELEPHONE (OUTPATIENT)
Dept: UROLOGY | Facility: MEDICAL CENTER | Age: 65
End: 2025-04-09

## 2025-04-09 ENCOUNTER — ANESTHESIA (OUTPATIENT)
Dept: PERIOP | Facility: HOSPITAL | Age: 65
End: 2025-04-09
Payer: COMMERCIAL

## 2025-04-09 ENCOUNTER — HOSPITAL ENCOUNTER (OUTPATIENT)
Facility: HOSPITAL | Age: 65
Setting detail: OUTPATIENT SURGERY
Discharge: HOME | End: 2025-04-09
Attending: UROLOGY | Admitting: UROLOGY
Payer: COMMERCIAL

## 2025-04-09 VITALS
HEIGHT: 67 IN | DIASTOLIC BLOOD PRESSURE: 70 MMHG | RESPIRATION RATE: 15 BRPM | OXYGEN SATURATION: 95 % | SYSTOLIC BLOOD PRESSURE: 129 MMHG | HEART RATE: 67 BPM | WEIGHT: 164.68 LBS | BODY MASS INDEX: 25.85 KG/M2 | TEMPERATURE: 96.8 F

## 2025-04-09 DIAGNOSIS — N21.0 BLADDER STONE: ICD-10-CM

## 2025-04-09 DIAGNOSIS — T19.1XXA FOREIGN BODY IN BLADDER, INITIAL ENCOUNTER: Primary | ICD-10-CM

## 2025-04-09 PROCEDURE — 52317 REMOVE BLADDER STONE: CPT | Performed by: UROLOGY

## 2025-04-09 PROCEDURE — 52310 CYSTOSCOPY AND TREATMENT: CPT | Performed by: UROLOGY

## 2025-04-09 PROCEDURE — 88305 TISSUE EXAM BY PATHOLOGIST: CPT | Performed by: PATHOLOGY

## 2025-04-09 PROCEDURE — NC001 PR NO CHARGE: Performed by: UROLOGY

## 2025-04-09 PROCEDURE — C1758 CATHETER, URETERAL: HCPCS | Performed by: UROLOGY

## 2025-04-09 RX ORDER — ALBUTEROL SULFATE 0.83 MG/ML
2.5 SOLUTION RESPIRATORY (INHALATION) ONCE AS NEEDED
Status: DISCONTINUED | OUTPATIENT
Start: 2025-04-09 | End: 2025-04-09 | Stop reason: HOSPADM

## 2025-04-09 RX ORDER — ONDANSETRON 2 MG/ML
INJECTION INTRAMUSCULAR; INTRAVENOUS AS NEEDED
Status: DISCONTINUED | OUTPATIENT
Start: 2025-04-09 | End: 2025-04-09

## 2025-04-09 RX ORDER — PHENAZOPYRIDINE HYDROCHLORIDE 100 MG/1
100 TABLET, FILM COATED ORAL
Status: DISCONTINUED | OUTPATIENT
Start: 2025-04-09 | End: 2025-04-09 | Stop reason: HOSPADM

## 2025-04-09 RX ORDER — IBUPROFEN 200 MG
200 TABLET ORAL EVERY 6 HOURS PRN
COMMUNITY

## 2025-04-09 RX ORDER — PHENAZOPYRIDINE HYDROCHLORIDE 200 MG/1
200 TABLET, FILM COATED ORAL
Qty: 10 TABLET | Refills: 0 | Status: SHIPPED | OUTPATIENT
Start: 2025-04-09

## 2025-04-09 RX ORDER — PHENYLEPHRINE HCL IN 0.9% NACL 1 MG/10 ML
SYRINGE (ML) INTRAVENOUS AS NEEDED
Status: DISCONTINUED | OUTPATIENT
Start: 2025-04-09 | End: 2025-04-09

## 2025-04-09 RX ORDER — PROPOFOL 10 MG/ML
INJECTION, EMULSION INTRAVENOUS AS NEEDED
Status: DISCONTINUED | OUTPATIENT
Start: 2025-04-09 | End: 2025-04-09

## 2025-04-09 RX ORDER — OXYBUTYNIN CHLORIDE 5 MG/1
5 TABLET ORAL 3 TIMES DAILY
Status: DISCONTINUED | OUTPATIENT
Start: 2025-04-09 | End: 2025-04-09 | Stop reason: HOSPADM

## 2025-04-09 RX ORDER — OXYBUTYNIN CHLORIDE 5 MG/1
5 TABLET ORAL 3 TIMES DAILY
Qty: 15 TABLET | Refills: 0 | Status: SHIPPED | OUTPATIENT
Start: 2025-04-09 | End: 2025-04-16 | Stop reason: SDUPTHER

## 2025-04-09 RX ORDER — MIDAZOLAM HYDROCHLORIDE 2 MG/2ML
INJECTION, SOLUTION INTRAMUSCULAR; INTRAVENOUS AS NEEDED
Status: DISCONTINUED | OUTPATIENT
Start: 2025-04-09 | End: 2025-04-09

## 2025-04-09 RX ORDER — ONDANSETRON 2 MG/ML
4 INJECTION INTRAMUSCULAR; INTRAVENOUS ONCE AS NEEDED
Status: DISCONTINUED | OUTPATIENT
Start: 2025-04-09 | End: 2025-04-09 | Stop reason: HOSPADM

## 2025-04-09 RX ORDER — HYDROCODONE BITARTRATE AND ACETAMINOPHEN 5; 325 MG/1; MG/1
1 TABLET ORAL EVERY 6 HOURS PRN
Status: DISCONTINUED | OUTPATIENT
Start: 2025-04-09 | End: 2025-04-09 | Stop reason: HOSPADM

## 2025-04-09 RX ORDER — CEFAZOLIN SODIUM 2 G/50ML
2000 SOLUTION INTRAVENOUS ONCE
Status: COMPLETED | OUTPATIENT
Start: 2025-04-09 | End: 2025-04-09

## 2025-04-09 RX ORDER — LIDOCAINE HYDROCHLORIDE 20 MG/ML
INJECTION, SOLUTION EPIDURAL; INFILTRATION; INTRACAUDAL; PERINEURAL AS NEEDED
Status: DISCONTINUED | OUTPATIENT
Start: 2025-04-09 | End: 2025-04-09

## 2025-04-09 RX ORDER — FENTANYL CITRATE/PF 50 MCG/ML
25 SYRINGE (ML) INJECTION
Status: DISCONTINUED | OUTPATIENT
Start: 2025-04-09 | End: 2025-04-09 | Stop reason: HOSPADM

## 2025-04-09 RX ORDER — SODIUM CHLORIDE, SODIUM LACTATE, POTASSIUM CHLORIDE, CALCIUM CHLORIDE 600; 310; 30; 20 MG/100ML; MG/100ML; MG/100ML; MG/100ML
125 INJECTION, SOLUTION INTRAVENOUS CONTINUOUS
Status: DISCONTINUED | OUTPATIENT
Start: 2025-04-09 | End: 2025-04-09 | Stop reason: HOSPADM

## 2025-04-09 RX ORDER — FENTANYL CITRATE 50 UG/ML
INJECTION, SOLUTION INTRAMUSCULAR; INTRAVENOUS AS NEEDED
Status: DISCONTINUED | OUTPATIENT
Start: 2025-04-09 | End: 2025-04-09

## 2025-04-09 RX ORDER — DEXAMETHASONE SODIUM PHOSPHATE 10 MG/ML
INJECTION, SOLUTION INTRAMUSCULAR; INTRAVENOUS AS NEEDED
Status: DISCONTINUED | OUTPATIENT
Start: 2025-04-09 | End: 2025-04-09

## 2025-04-09 RX ADMIN — FENTANYL CITRATE 50 MCG: 50 INJECTION INTRAMUSCULAR; INTRAVENOUS at 08:54

## 2025-04-09 RX ADMIN — SODIUM CHLORIDE, SODIUM LACTATE, POTASSIUM CHLORIDE, AND CALCIUM CHLORIDE: .6; .31; .03; .02 INJECTION, SOLUTION INTRAVENOUS at 09:37

## 2025-04-09 RX ADMIN — CEFAZOLIN SODIUM 2000 MG: 2 SOLUTION INTRAVENOUS at 08:53

## 2025-04-09 RX ADMIN — HYDROCODONE BITARTRATE AND ACETAMINOPHEN 1 TABLET: 5; 325 TABLET ORAL at 11:51

## 2025-04-09 RX ADMIN — MIDAZOLAM HYDROCHLORIDE 2 MG: 1 INJECTION, SOLUTION INTRAMUSCULAR; INTRAVENOUS at 08:54

## 2025-04-09 RX ADMIN — Medication 100 MCG: at 09:21

## 2025-04-09 RX ADMIN — Medication 100 MCG: at 09:16

## 2025-04-09 RX ADMIN — DEXAMETHASONE SODIUM PHOSPHATE 10 MG: 10 INJECTION, SOLUTION INTRAMUSCULAR; INTRAVENOUS at 09:09

## 2025-04-09 RX ADMIN — SODIUM CHLORIDE, SODIUM LACTATE, POTASSIUM CHLORIDE, AND CALCIUM CHLORIDE 125 ML/HR: .6; .31; .03; .02 INJECTION, SOLUTION INTRAVENOUS at 08:05

## 2025-04-09 RX ADMIN — PROPOFOL 150 MG: 10 INJECTION, EMULSION INTRAVENOUS at 08:56

## 2025-04-09 RX ADMIN — ONDANSETRON 4 MG: 2 INJECTION INTRAMUSCULAR; INTRAVENOUS at 09:30

## 2025-04-09 RX ADMIN — LIDOCAINE HYDROCHLORIDE 80 MG: 20 INJECTION, SOLUTION EPIDURAL; INFILTRATION; INTRACAUDAL at 08:56

## 2025-04-09 NOTE — ANESTHESIA POSTPROCEDURE EVALUATION
Post-Op Assessment Note    CV Status:  Stable    Pain management: adequate       Mental Status:  Alert and awake   Hydration Status:  Euvolemic   PONV Controlled:  Controlled   Airway Patency:  Patent     Post Op Vitals Reviewed: Yes    No anethesia notable event occurred.    Staff: Anesthesiologist           Last Filed PACU Vitals:  Vitals Value Taken Time   Temp 97.2 °F (36.2 °C) 04/09/25 0948   Pulse 64 04/09/25 1018   /77 04/09/25 1018   Resp 16 04/09/25 1003   SpO2 96 % 04/09/25 1018   Vitals shown include unfiled device data.    Modified Cirilo:     Vitals Value Taken Time   Activity 2 04/09/25 1003   Respiration 2 04/09/25 1003   Circulation 2 04/09/25 1003   Consciousness 2 04/09/25 1003   Oxygen Saturation 2 04/09/25 1003     Modified Cirilo Score: 10

## 2025-04-09 NOTE — ANESTHESIA PREPROCEDURE EVALUATION
Procedure:  CYSTOSCOPY FLEXIBLE (Bladder)  LITHOLAPAXY HOLMIUM LASER (Bladder)    Relevant Problems   CARDIO   (+) Mixed hyperlipidemia      GI/HEPATIC   (+) Gastroesophageal reflux disease without esophagitis      MUSCULOSKELETAL   (+) Arthritis of carpometacarpal (CMC) joint of left thumb      Nervous and Auditory   (+) Bilateral hearing loss      Orthopedic/Musculoskeletal   (+) Age-related osteoporosis without current pathological fracture      FEN/Gastrointestinal   (+) Esophageal stricture      Other   (+) History of renal calculi        Physical Exam    Airway  Comment: Narrow high arched palate  Mallampati score: II         Dental   No notable dental hx     Cardiovascular  Rhythm: regular, Rate: normal    Pulmonary   Breath sounds clear to auscultation    Other Findings  post-pubertal.      Anesthesia Plan  ASA Score- 2     Anesthesia Type- general with ASA Monitors.         Additional Monitors:     Airway Plan: LMA.           Plan Factors-Exercise tolerance (METS): >4 METS.    Chart reviewed.   Existing labs reviewed.     Patient is not a current smoker.      Obstructive sleep apnea risk education given perioperatively.        Induction- intravenous.    Postoperative Plan- Plan for postoperative opioid use.         Informed Consent- Anesthetic plan and risks discussed with patient.        NPO Status:  Vitals Value Taken Time   Date of last liquid 04/08/25 04/09/25 0749   Time of last liquid 2300 04/09/25 0749   Date of last solid 04/08/25 04/09/25 0749   Time of last solid 2100 04/09/25 0749

## 2025-04-09 NOTE — OP NOTE
OPERATIVE REPORT  PATIENT NAME: Karen Goins    :  1960  MRN: [id number]  Pt Location: [facility] OR ROOM 01    SURGERY DATE: 2025    Surgeons and Role:     * Jorge Holman MD - Primary    Preop Diagnosis:  Bladder stone [N21.0]    Post-Op Diagnosis Codes:     * Bladder stone [N21.0]  EXTRUDED MESH/SUTURE MATERIAL IN BLADDER    Procedure(s):  LITHOLAPAXY HOLMIUM LASER  CYSTOSCOPY  TRANSURETHRAL RESECTION OF POSTERIOR WALL OF BLADDER    Specimen(s):  ID Type Source Tests Collected by Time Destination   A :  Tissue Urinary Bladder  Jorge Holman MD 2025 0935        Estimated Blood Loss:   Minimal    Drains:  * No LDAs found *    Anesthesia Type:   General    Operative Indications:  Bladder stone [N21.0]    Operative Findings:  Calcification of extruded mesh/suture material in posterior wall of bladder      Complications:   None    Procedure and Technique:    The patient was identified and brought to the OR.  Antibiotic prophylaxis and DVT prophylaxis were administered.  They were placed in the lithotomy position with care to pad all pressure points.  They were prepared and draped in the usual sterile fashion.    A surgical time out was performed with all in the room in agreement with the correct patient, procedure, indications, and laterality.  A 22-Jamaican rigid cystoscope was used to enter the bladder.  The bladder was inspected in its entirety.  The ureteral orifices were identified in their orthotopic positions.    The was a 1 cm calcification along the posterior wall of the bladder adherent to extruded mesh/suture material extending into bladder lumen.  Using a 550 micron Holmium laser fiber the stone was fragmented off the mesh/suture material.      Using a 26-Jamaican continuous flow resectoscope and a monopolar thin loop the area of extruded mesh/suture was resected from the posterior wall of the bladder.  There was no visible suture material remaining at the conclusion of the case.  A  16-Persian Urrutia catheter was then placed into the bladder.      The patient was placed back in the supine position, awakened from general anesthesia and brought to the recovery room in stable condition.     I was present for the entire procedure.    Patient Disposition:  PACU         SIGNATURE: Jorge Holman MD  DATE: April 9, 2025  TIME: 9:38 AM

## 2025-04-09 NOTE — DISCHARGE INSTR - AVS FIRST PAGE
Fariba, the calcification in the posterior wall of the bladder was stone building around mesh/suture material.  I removed to stone and scrapped out the mesh/suture material from the wall of the bladder.  You have a Urrutia catheter that can be removed in 5 days.  The Urrutia will help the bladder heal.

## 2025-04-09 NOTE — H&P
H&P - Urology   Name: Karen Goins 65 y.o. female I MRN: [id number]  Unit/Bed#: OR POOL I Date of Admission: 4/9/2025   Date of Service: 4/9/2025 I Hospital Day: 0     Assessment & Plan   This is a 65 y.o. year old female here for cystoscopy, possible cystolitholapaxy, and she is stable and optimized for her procedure.    History of Present Illness    Karen Goins is a 65 y.o. year old female who presents for cystoscopy and possible cystolitholapaxy for calcifications in bladder on CT scan with gross hematuria.      REVIEW OF SYSTEMS: Per the HPI, and otherwise unremarkable.    Historical Information   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    Have issues with eating rice and other foods    Closed fracture of right inferior pubic ramus (HCC) 12/08/2019    Closed fracture of right superior pubic ramus (HCC) 12/08/2019    Colon polyp     Dysphagia     GERD (gastroesophageal reflux disease)     Headache(784.0) HAVE HAD SEVERAL HEADACHES    THOUGHT IT WAS ALLERGIES OR DIABETES RELATED    Hernia 2019    Discovered after falling    Irritable bowel syndrome 2/7/1999    Had a lot of stress at the time    Kidney stone     Shingles     Vertigo     LAST ASSESSED 3/8/13; RESOLVED 11/10/15    Visual impairment SMALL CHANGES IN EYESIGHT    HAD VISION EXAM. SMALL CHANGES IN VISION     Past Surgical History:   Procedure Laterality Date    ABDOMINAL SURGERY      BLADDER SUSPENSION      BUNIONECTOMY Right     with implants    COLONOSCOPY      Approx 10 years sfo Dr Ansari    EGD      EYE SURGERY  EYE LID SURGERY IN PAST YEAR    HAD GROWTH ON EYELID AFFECTING EYESIGHT    FLEXIBLE SIGMOIDOSCOPY  1999    Disgnosed with IBS    HERNIA REPAIR  2021    Had hernia repaired    TOTAL ABDOMINAL HYSTERECTOMY  2006    WITH REMOVAL OF BOTH OVARIES     UPPER GASTROINTESTINAL ENDOSCOPY       Approx 10 yrs ago Dr Ansari.  Also had esophagus stretched     Social History     Tobacco Use    Smoking status: Never    Smokeless tobacco: Never   Vaping Use    Vaping status: Never Used   Substance and Sexual Activity    Alcohol use: Yes     Alcohol/week: 1.0 standard drink of alcohol     Types: 1 Cans of beer per week     Comment: Not even 1 beer a week. I probably drink a case a year.    Drug use: Never    Sexual activity: Not Currently     Partners: Male     Birth control/protection: Surgical     Comment: Hysterectomy     E-Cigarette/Vaping    E-Cigarette Use Never User      E-Cigarette/Vaping Substances    Nicotine No     THC No     CBD No     Flavoring No     Other No     Unknown No      Family History   Problem Relation Age of Onset    Diabetes Father     Melanoma Father     Prostate cancer Father     Cancer Mother         Multiple Myeloma for 20 yrs    Diabetes Brother         Diabetes    Hypertension Brother     Diabetes Brother         Diabetes       Meds/Allergies     Current Facility-Administered Medications:     ceFAZolin (ANCEF) IVPB (premix in dextrose) 2,000 mg 50 mL, 2,000 mg, Intravenous, Once    lactated ringers infusion, 125 mL/hr, Intravenous, Continuous, 125 mL/hr at 04/09/25 0805  Allergies   Allergen Reactions    Sulfa Antibiotics Other (See Comments)     migraines    Sulfasalazine Other (See Comments)     migraines       Objective :  Temp:  [97.1 °F (36.2 °C)] 97.1 °F (36.2 °C)  HR:  [72] 72  BP: (130)/(74) 130/74  Resp:  [19] 19  SpO2:  [97 %] 97 %  O2 Device: None (Room air)    Physical Exam  Vitals and nursing note reviewed.   Constitutional:       General: She is not in acute distress.     Appearance: She is well-developed.   HENT:      Head: Normocephalic and atraumatic.   Eyes:      Conjunctiva/sclera: Conjunctivae normal.   Cardiovascular:      Rate and Rhythm: Normal rate and regular rhythm.      Heart sounds: No murmur heard.  Pulmonary:      Effort: Pulmonary effort is normal. No  respiratory distress.      Breath sounds: Normal breath sounds.   Abdominal:      Palpations: Abdomen is soft.      Tenderness: There is no abdominal tenderness.   Musculoskeletal:         General: No swelling.      Cervical back: Neck supple.   Skin:     General: Skin is warm and dry.      Capillary Refill: Capillary refill takes less than 2 seconds.   Neurological:      Mental Status: She is alert.   Psychiatric:         Mood and Affect: Mood normal.       Gen: NAD  Head: NCAT  CV: RRR  CHEST: Clear  ABD: soft, NT/ND  EXT: no edema

## 2025-04-10 NOTE — TELEPHONE ENCOUNTER
Post Op Note    Karen Goins is a 65 y.o. female s/p  LITHOLAPAXY HOLMIUM LASER (Bladder) CYSTOSCOPY (Bladder) performed 4/9/2025.  Karen Goins is a patient of our [place] office. Surgery was completed by Dr. Holman.    How would you rate your pain on a scale from 1 to 10, 10 being the worst pain ever? 3  Have you had a fever? No  Have your bowel movements been regular? Yes  Do you have any difficulty urinating? No; pt has campos catheter  If the patient has a campos- are you comfortable caring for your campos? Yes Is it draining urine? Yes  Do you have any other questions or concerns that I can address at this time? None at this time    Call placed. Spoke to pt. Pt stated that she is doing okay. Pain is minimal and is taking the prescribed medications. Pt stated that she did have leakage around the catheter this morning. Advised pt that the this is normal and it is a bladder spasm. Advised to continue taking the oxybutynin to help with the spasms. Pt understood. Pt stated that her stat lock had broke and that she had taped it to her leg. Advised pt that I can put some stat locks out front and that they can be picked up at our office. Pt was appreciative that this can be done and that pt  would stop by to pick them up. Pt asked that due to the upcoming weekend if she were to have a fever what to do. Advised to try tyelnol and if that would not help any fever, chills, uncontrollable pain, dark red merlot urine then pt would need to go to the ED. Pt understood. Advised pt that campos catheter can be removed in 5 days. Advised that this will be a 2 part appointment one in the morning for campos removal and the other in the afternoon for PVR. Pt is scheduled for 4/15 @ 9 am and 3 pm. Pt confirmed appointment date, time, and location. Advised if any other questions or concerns to give our office a call that a nurse is on call 24/7.

## 2025-04-14 PROCEDURE — 88305 TISSUE EXAM BY PATHOLOGIST: CPT | Performed by: PATHOLOGY

## 2025-04-15 ENCOUNTER — PROCEDURE VISIT (OUTPATIENT)
Dept: UROLOGY | Facility: MEDICAL CENTER | Age: 65
End: 2025-04-15
Payer: COMMERCIAL

## 2025-04-15 ENCOUNTER — TELEPHONE (OUTPATIENT)
Dept: UROLOGY | Facility: MEDICAL CENTER | Age: 65
End: 2025-04-15

## 2025-04-15 VITALS
HEART RATE: 107 BPM | SYSTOLIC BLOOD PRESSURE: 150 MMHG | DIASTOLIC BLOOD PRESSURE: 90 MMHG | HEIGHT: 67 IN | BODY MASS INDEX: 25.74 KG/M2 | WEIGHT: 164 LBS

## 2025-04-15 DIAGNOSIS — N21.0 BLADDER STONE: Primary | ICD-10-CM

## 2025-04-15 LAB — POST-VOID RESIDUAL VOLUME, ML POC: 54 ML

## 2025-04-15 PROCEDURE — PBNCHG PB NO CHARGE PLACEHOLDER

## 2025-04-15 PROCEDURE — 51798 US URINE CAPACITY MEASURE: CPT

## 2025-04-15 PROCEDURE — 99024 POSTOP FOLLOW-UP VISIT: CPT

## 2025-04-15 NOTE — PROGRESS NOTES
"4/15/2025    Karen Goins  1960  [id number]    Diagnosis  Chief Complaint    Bladder Stone         Patient presents for campos removal/void trial s/p  LITHOLAPAXY HOLMIUM LASER (Bladder)       CYSTOSCOPY (Bladder) on 4/9/25 with Dr. Holman.     Plan  Return this afternoon for PVR  Sx po visit scheduled for 5/7/25    Procedure Campos removal/voiding trial    Campos catheter removed after deflation of an intact balloon. Patient tolerated well. Encouraged patient to hydrate well and return this afternoon for post void residual.  she knows she may return early if uncomfortable and unable to urinate. Patient agrees to this plan.    Patient returned this afternoon. Pt stated she has voided 3 times since campos removal this morning.  Pt voided in the office.  PVR performed measuring 54 mls.  Pt knows to contact the office with any questions or concerns.        Vitals:    04/15/25 0906   BP: 150/90   Pulse: (!) 107   Weight: 74.4 kg (164 lb)   Height: 5' 7\" (1.702 m)           Akua Jain RN       "

## 2025-04-16 ENCOUNTER — TELEPHONE (OUTPATIENT)
Age: 65
End: 2025-04-16

## 2025-04-16 DIAGNOSIS — T19.1XXA FOREIGN BODY IN BLADDER, INITIAL ENCOUNTER: ICD-10-CM

## 2025-04-16 DIAGNOSIS — N21.0 BLADDER STONE: ICD-10-CM

## 2025-04-16 RX ORDER — TAMSULOSIN HYDROCHLORIDE 0.4 MG/1
0.4 CAPSULE ORAL
Qty: 30 CAPSULE | Refills: 0 | Status: SHIPPED | OUTPATIENT
Start: 2025-04-16

## 2025-04-16 RX ORDER — OXYBUTYNIN CHLORIDE 5 MG/1
5 TABLET ORAL 3 TIMES DAILY
Qty: 15 TABLET | Refills: 0 | Status: SHIPPED | OUTPATIENT
Start: 2025-04-16

## 2025-04-16 NOTE — TELEPHONE ENCOUNTER
Patient transferred to [facility]. Since campos cath removal in office yesterday, patient is having bladder spasms,nausea, and pinkish urine. Patient ran out of her Oxybutynin.     Patient also would like to know,if she should continue taking Flomax?    Advised patient to stay well hydrated with water, pinkish urine is normal after catheter removal .     Please advise    #[phone]

## 2025-04-16 NOTE — TELEPHONE ENCOUNTER
Call placed. Spoke to pt. Advised pt that a script for  oxybutynin along with the flomax have been sent to pt preferred pharmacy. Advised pt can stop taking the flomax if urinating okay. Pt understood. Pt stated that she is still having pain and pinkish urine since catheter removal. Advised pt that it is normal and that pt still have some stone fragments that could be causing the discomfort along with the pinkish urine. Pt understood. Advised to continue with staying hydrated and to monitor symptoms. Advised if pain becomes to much and bothersome to give our office a call or proceed to the ED. Pt understood and was appreciative of the call back.

## 2025-04-24 ENCOUNTER — APPOINTMENT (EMERGENCY)
Dept: CT IMAGING | Facility: HOSPITAL | Age: 65
End: 2025-04-24
Payer: COMMERCIAL

## 2025-04-24 ENCOUNTER — HOSPITAL ENCOUNTER (EMERGENCY)
Facility: HOSPITAL | Age: 65
Discharge: HOME | End: 2025-04-24
Attending: EMERGENCY MEDICINE
Payer: COMMERCIAL

## 2025-04-24 ENCOUNTER — NURSE TRIAGE (OUTPATIENT)
Age: 65
End: 2025-04-24

## 2025-04-24 VITALS
TEMPERATURE: 98.2 F | BODY MASS INDEX: 25.34 KG/M2 | DIASTOLIC BLOOD PRESSURE: 62 MMHG | RESPIRATION RATE: 18 BRPM | SYSTOLIC BLOOD PRESSURE: 114 MMHG | OXYGEN SATURATION: 97 % | HEART RATE: 81 BPM | WEIGHT: 161.82 LBS

## 2025-04-24 DIAGNOSIS — N39.0 UTI (URINARY TRACT INFECTION): Primary | ICD-10-CM

## 2025-04-24 LAB
ALBUMIN SERPL BCG-MCNC: 3.6 G/DL (ref 3.5–5)
ALP SERPL-CCNC: 72 U/L (ref 34–104)
ALT SERPL W P-5'-P-CCNC: 8 U/L (ref 7–52)
ANION GAP SERPL CALCULATED.3IONS-SCNC: 8 MMOL/L (ref 4–13)
AST SERPL W P-5'-P-CCNC: 9 U/L (ref 13–39)
BACTERIA UR QL AUTO: ABNORMAL /HPF
BASOPHILS # BLD AUTO: 0.06 THOUSANDS/ÂΜL (ref 0–0.1)
BASOPHILS NFR BLD AUTO: 1 % (ref 0–1)
BILIRUB SERPL-MCNC: 0.49 MG/DL (ref 0.2–1)
BILIRUB UR QL STRIP: NEGATIVE
BUN SERPL-MCNC: 8 MG/DL (ref 5–25)
CALCIUM SERPL-MCNC: 8.6 MG/DL (ref 8.4–10.2)
CHLORIDE SERPL-SCNC: 101 MMOL/L (ref 96–108)
CLARITY UR: ABNORMAL
CO2 SERPL-SCNC: 27 MMOL/L (ref 21–32)
COLOR UR: ABNORMAL
CREAT SERPL-MCNC: 0.91 MG/DL (ref 0.6–1.3)
EOSINOPHIL # BLD AUTO: 0.11 THOUSAND/ÂΜL (ref 0–0.61)
EOSINOPHIL NFR BLD AUTO: 1 % (ref 0–6)
ERYTHROCYTE [DISTWIDTH] IN BLOOD BY AUTOMATED COUNT: 12.9 % (ref 11.6–15.1)
GFR SERPL CREATININE-BSD FRML MDRD: 67 ML/MIN/1.73SQ M
GLUCOSE SERPL-MCNC: 105 MG/DL (ref 65–140)
GLUCOSE UR STRIP-MCNC: NEGATIVE MG/DL
HCT VFR BLD AUTO: 35.9 % (ref 34.8–46.1)
HGB BLD-MCNC: 11.7 G/DL (ref 11.5–15.4)
HGB UR QL STRIP.AUTO: ABNORMAL
IMM GRANULOCYTES # BLD AUTO: 0.05 THOUSAND/UL (ref 0–0.2)
IMM GRANULOCYTES NFR BLD AUTO: 1 % (ref 0–2)
KETONES UR STRIP-MCNC: ABNORMAL MG/DL
LEUKOCYTE ESTERASE UR QL STRIP: ABNORMAL
LIPASE SERPL-CCNC: 14 U/L (ref 11–82)
LYMPHOCYTES # BLD AUTO: 1.07 THOUSANDS/ÂΜL (ref 0.6–4.47)
LYMPHOCYTES NFR BLD AUTO: 11 % (ref 14–44)
MCH RBC QN AUTO: 29.9 PG (ref 26.8–34.3)
MCHC RBC AUTO-ENTMCNC: 32.6 G/DL (ref 31.4–37.4)
MCV RBC AUTO: 92 FL (ref 82–98)
MONOCYTES # BLD AUTO: 0.96 THOUSAND/ÂΜL (ref 0.17–1.22)
MONOCYTES NFR BLD AUTO: 10 % (ref 4–12)
NEUTROPHILS # BLD AUTO: 7.15 THOUSANDS/ÂΜL (ref 1.85–7.62)
NEUTS SEG NFR BLD AUTO: 76 % (ref 43–75)
NITRITE UR QL STRIP: POSITIVE
NON-SQ EPI CELLS URNS QL MICRO: ABNORMAL /HPF
NRBC BLD AUTO-RTO: 0 /100 WBCS
PH UR STRIP.AUTO: 6 [PH]
PLATELET # BLD AUTO: 227 THOUSANDS/UL (ref 149–390)
PMV BLD AUTO: 8.8 FL (ref 8.9–12.7)
POTASSIUM SERPL-SCNC: 3.6 MMOL/L (ref 3.5–5.3)
PROT SERPL-MCNC: 6.4 G/DL (ref 6.4–8.4)
PROT UR STRIP-MCNC: ABNORMAL MG/DL
RBC # BLD AUTO: 3.91 MILLION/UL (ref 3.81–5.12)
RBC #/AREA URNS AUTO: ABNORMAL /HPF
RENAL EPI CELLS #/AREA URNS HPF: PRESENT /[HPF]
SODIUM SERPL-SCNC: 136 MMOL/L (ref 135–147)
SP GR UR STRIP.AUTO: 1.01 (ref 1–1.03)
TRANS CELLS #/AREA URNS HPF: PRESENT /[HPF]
UROBILINOGEN UR STRIP-ACNC: <2 MG/DL
WBC # BLD AUTO: 9.4 THOUSAND/UL (ref 4.31–10.16)
WBC #/AREA URNS AUTO: ABNORMAL /HPF
WBC CLUMPS # UR AUTO: PRESENT /UL

## 2025-04-24 PROCEDURE — 87086 URINE CULTURE/COLONY COUNT: CPT

## 2025-04-24 PROCEDURE — 74176 CT ABD & PELVIS W/O CONTRAST: CPT

## 2025-04-24 PROCEDURE — 99284 EMERGENCY DEPT VISIT MOD MDM: CPT

## 2025-04-24 PROCEDURE — 80053 COMPREHEN METABOLIC PANEL: CPT

## 2025-04-24 PROCEDURE — 81001 URINALYSIS AUTO W/SCOPE: CPT

## 2025-04-24 PROCEDURE — 83690 ASSAY OF LIPASE: CPT

## 2025-04-24 PROCEDURE — 36415 COLL VENOUS BLD VENIPUNCTURE: CPT

## 2025-04-24 PROCEDURE — 87186 SC STD MICRODIL/AGAR DIL: CPT

## 2025-04-24 PROCEDURE — 85025 COMPLETE CBC W/AUTO DIFF WBC: CPT

## 2025-04-24 PROCEDURE — 87106 FUNGI IDENTIFICATION YEAST: CPT

## 2025-04-24 PROCEDURE — 96374 THER/PROPH/DIAG INJ IV PUSH: CPT

## 2025-04-24 PROCEDURE — 96361 HYDRATE IV INFUSION ADD-ON: CPT

## 2025-04-24 RX ORDER — CEFUROXIME AXETIL 500 MG/1
500 TABLET ORAL EVERY 12 HOURS SCHEDULED
Qty: 14 TABLET | Refills: 0 | Status: SHIPPED | OUTPATIENT
Start: 2025-04-24 | End: 2025-05-01

## 2025-04-24 RX ORDER — KETOROLAC TROMETHAMINE 30 MG/ML
15 INJECTION, SOLUTION INTRAMUSCULAR; INTRAVENOUS ONCE
Status: COMPLETED | OUTPATIENT
Start: 2025-04-24 | End: 2025-04-24

## 2025-04-24 RX ADMIN — KETOROLAC TROMETHAMINE 15 MG: 30 INJECTION, SOLUTION INTRAMUSCULAR; INTRAVENOUS at 13:16

## 2025-04-24 RX ADMIN — SODIUM CHLORIDE 1000 ML: 0.9 INJECTION, SOLUTION INTRAVENOUS at 13:16

## 2025-04-24 NOTE — TELEPHONE ENCOUNTER
"04/9/25 LITHOLAPAXY HOLMIUM LASER   04/15/25    REASON FOR CONVERSATION: Flank Pain  Fever; fatigue;   SYMPTOMS: cloudy urine;   Bladder discomfort at times;  right flank pain for weeks;  temp 99.7 currently after tylenol an hour ago;  temp earlier 101.3;  fatigue since yesterday;   OTHER:    woke up \"drenched\" last night. \"Feels\" exhausted.    DISPOSITION: Go to ED Now    Hydration / ER precautions reviewed.    Patient has agreed to go to ER for evaluation.      Reason for Disposition   The patient has a fever of 101 or higher    Answer Assessment - Initial Assessment Questions  1. When did this pain start?   Right side flank pain;  for weeks;  last night chills;   current temp of 99.7 after tylenol  2. Where is your pain? Is your pain on the left, right, or both sides and does it radiate anywhere?  Right flank pain;  taking tylenol / ibuprofen  3. Are you able to rate your pain on a scale of 1-10 with 10 being the worst? Would you say it is mild, moderate, or severe?   mild  4. Do you have other symptoms like nausea, vomiting or a fever of 101 or higher?   101.3  earlier;     99.7 now after tylenol.    5. Do you have any urinary symptoms such as inability to urinate, urgency, frequency, pain or burning with urination?   No difficulty with urination  6. Do you have a history of kidney stones or UTI's?   Tired;  7. Have you had a recent urologic procedure or surgery? If yes, what procedure or surgery?   LITHOLAPAXY HOLMIUM LASER 04/09/25  Urrutia removed 04/15/25;    Protocols used: Urology-Flank Pain / Kidney Stones / Hydronephrosis-ADULT-[place]    "

## 2025-04-24 NOTE — DISCHARGE INSTRUCTIONS
Take antibiotics as prescribed. Maintain adequate hydration. Follow up with surgeon next week. If symptoms worsen, return to the emergency department for re-evaluation.

## 2025-04-24 NOTE — ED PROVIDER NOTES
"Time reflects when diagnosis was documented in both MDM as applicable and the Disposition within this note       Time User Action Codes Description Comment    4/24/2025  3:38 PM Roseanne Hector Add [N39.0] UTI (urinary tract infection)           ED Disposition       ED Disposition   Discharge    Condition   Stable    Date/Time   Thu Apr 24, 2025  3:38 PM    Comment   Karen Goins discharge to home/self care.                   Assessment & Plan       Medical Decision Making  64 y/o female 2 weeks s/p stone removal from the bladder here for persistent left-sided abdominal pain as well as right sided flank pain.  Denies chest pain, shortness of breath, calf pain, leg swelling, urinary symptoms, N/V/D.  States she has not had any periods of sitting, she has still been active.  Patient is well-appearing on exam, in no distress.  Vitals are normal.  She did have some tenderness to palpation of the abdomen.  Labs were unremarkable, specifically no leukocytosis.  Ordered CT scan, it showed \"No acute intra-abdominal process. Specifically no evidence of hydronephrosis or nephrolithiasis. There is mild perinephric fat stranding bilaterally, which is nonspecific and may be physiologic in nature. Correlation with urinalysis is recommended to exclude possibility infection.\" U/A showed signs of infection, will treat with Cefuroxime. Discussed supportive care, following up with surgeon, risks and return precautions. Patient was agreeable to plan and was discharged home.         Amount and/or Complexity of Data Reviewed  Labs: ordered. Decision-making details documented in ED Course.  Radiology: ordered.    Risk  Prescription drug management.        ED Course as of 04/24/25 1549   Thu Apr 24, 2025   1339 CBC and differential(!)  Grossly normal, no leukocytosis   1350 Comprehensive metabolic panel(!)  Normal    1350 LIPASE: 14   1453 Just waiting for U/A at this point   1538 Leukocytes, UA(!): Large   1538 Nitrite, UA(!): " Positive  Will treat        Medications   sodium chloride 0.9 % bolus 1,000 mL (0 mL Intravenous Stopped 4/24/25 1503)   ketorolac (TORADOL) injection 15 mg (15 mg Intravenous Given 4/24/25 1316)       ED Risk Strat Scores                    No data recorded        SBIRT 22yo+      Flowsheet Row Most Recent Value   Initial Alcohol Screen: US AUDIT-C     1. How often do you have a drink containing alcohol? 1 Filed at: 04/24/2025 1301   2. How many drinks containing alcohol do you have on a typical day you are drinking?  0 Filed at: 04/24/2025 1301   3a. Male UNDER 65: How often do you have five or more drinks on one occasion? 0 Filed at: 04/24/2025 1301   3b. FEMALE Any Age, or MALE 65+: How often do you have 4 or more drinks on one occassion? 0 Filed at: 04/24/2025 1301   Audit-C Score 1 Filed at: 04/24/2025 1301   EDWARD: How many times in the past year have you...    Used an illegal drug or used a prescription medication for non-medical reasons? Never Filed at: 04/24/2025 1301                            History of Present Illness       Chief Complaint   Patient presents with    Flank Pain     Right flank pain x weeks. Fevers at home.        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    Have issues with eating rice and other foods    Closed fracture of right inferior pubic ramus (HCC) 12/08/2019    Closed fracture of right superior pubic ramus (HCC) 12/08/2019    Colon polyp     Dysphagia     GERD (gastroesophageal reflux disease)     Headache(784.0) HAVE HAD SEVERAL HEADACHES    THOUGHT IT WAS ALLERGIES OR DIABETES RELATED    Hernia 2019    Discovered after falling    Irritable bowel syndrome 2/7/1999    Had a lot of stress at the time    Kidney stone     Shingles     Vertigo     LAST ASSESSED 3/8/13; RESOLVED 11/10/15    Visual impairment SMALL CHANGES IN EYESIGHT    HAD  VISION EXAM. SMALL CHANGES IN VISION      Past Surgical History:   Procedure Laterality Date    ABDOMINAL SURGERY      BLADDER SUSPENSION      BUNIONECTOMY Right     with implants    COLONOSCOPY      Approx 10 years sfo Dr Ansari    CYSTOSCOPY N/A 4/9/2025    Procedure: CYSTOSCOPY;  Surgeon: Jorge Holman MD;  Location: [facility];  Service: Urology    EGD      EYE SURGERY  EYE LID SURGERY IN PAST YEAR    HAD GROWTH ON EYELID AFFECTING EYESIGHT    FLEXIBLE SIGMOIDOSCOPY  1999    Disgnosed with IBS    HERNIA REPAIR  2021    Had hernia repaired    DE LITHOLAPAXY SMPL/SM <2.5 CM N/A 4/9/2025    Procedure: LITHOLAPAXY HOLMIUM LASER;  Surgeon: Jorge Holman MD;  Location: [facility];  Service: Urology    TOTAL ABDOMINAL HYSTERECTOMY  2006    WITH REMOVAL OF BOTH OVARIES     UPPER GASTROINTESTINAL ENDOSCOPY      Approx 10 yrs ago Dr Ansari.  Also had esophagus stretched      Family History   Problem Relation Age of Onset    Diabetes Father     Melanoma Father     Prostate cancer Father     Cancer Mother         Multiple Myeloma for 20 yrs    Diabetes Brother         Diabetes    Hypertension Brother     Diabetes Brother         Diabetes      Social History     Tobacco Use    Smoking status: Never    Smokeless tobacco: Never   Vaping Use    Vaping status: Never Used   Substance Use Topics    Alcohol use: Yes     Alcohol/week: 1.0 standard drink of alcohol     Types: 1 Cans of beer per week     Comment: Not even 1 beer a week. I probably drink a case a year.    Drug use: Never      E-Cigarette/Vaping    E-Cigarette Use Never User       E-Cigarette/Vaping Substances    Nicotine No     THC No     CBD No     Flavoring No     Other No     Unknown No       I have reviewed and agree with the history as documented.     Patient is a 64 y/o female 2 weeks s/p stone removal from the bladder here for persistent left-sided abdominal pain as well as right sided flank pain.  She states the symptoms started soon after surgery and  have been the same since.  Admits to having a decreased appetite as well as having a fever yesterday, but she did not have a fever prior.  Denies chest pain, shortness of breath, calf pain, leg swelling, urinary symptoms, N/V/D.  States she has not had any periods of sitting, she has still been active.      Flank Pain  Associated symptoms: fever (yesterday, not febrile at presentation)    Associated symptoms: no chest pain, no chills, no cough, no diarrhea, no dysuria, no hematuria, no nausea, no shortness of breath, no sore throat and no vomiting        Review of Systems   Constitutional:  Positive for fever (yesterday, not febrile at presentation). Negative for chills.   HENT:  Negative for congestion, ear pain, rhinorrhea and sore throat.    Eyes:  Negative for pain and visual disturbance.   Respiratory:  Negative for cough, chest tightness, shortness of breath and wheezing.    Cardiovascular:  Negative for chest pain and palpitations.   Gastrointestinal:  Positive for abdominal pain. Negative for diarrhea, nausea and vomiting.   Genitourinary:  Positive for flank pain. Negative for difficulty urinating, dysuria, frequency, hematuria and urgency.   Musculoskeletal:  Negative for arthralgias, back pain, myalgias, neck pain and neck stiffness.   Skin:  Negative for color change and rash.   Neurological:  Negative for dizziness, seizures, syncope, weakness, light-headedness and headaches.   All other systems reviewed and are negative.      Objective       ED Triage Vitals   Temperature Pulse Blood Pressure Respirations SpO2 Patient Position - Orthostatic VS   04/24/25 1221 04/24/25 1221 04/24/25 1303 04/24/25 1221 04/24/25 1221 04/24/25 1303   98.2 °F (36.8 °C) 104 112/75 18 97 % Lying      Temp src Heart Rate Source BP Location FiO2 (%) Pain Score    -- 04/24/25 1303 04/24/25 1303 -- 04/24/25 1316     Monitor Left arm  10 - Worst Possible Pain      Vitals      Date and Time Temp Pulse SpO2 Resp BP Pain Score FACES  Pain Rating User   04/24/25 1400 -- 81 97 % 18 114/62 -- -- AW   04/24/25 1330 -- -- -- -- -- No Pain -- AW   04/24/25 1316 -- -- -- -- -- 10 - Worst Possible Pain -- NZ   04/24/25 1303 -- 81 99 % 18 112/75 -- -- AW   04/24/25 1221 98.2 °F (36.8 °C) 104 97 % 18 -- -- -- UMM            Physical Exam  Vitals and nursing note reviewed.   Constitutional:       General: She is not in acute distress.     Appearance: Normal appearance. She is not ill-appearing, toxic-appearing or diaphoretic.   HENT:      Head: Normocephalic.      Nose: Nose normal.      Mouth/Throat:      Mouth: Mucous membranes are moist.      Pharynx: Oropharynx is clear. No oropharyngeal exudate or posterior oropharyngeal erythema.   Eyes:      Extraocular Movements: Extraocular movements intact.      Conjunctiva/sclera: Conjunctivae normal.      Pupils: Pupils are equal, round, and reactive to light.   Cardiovascular:      Rate and Rhythm: Normal rate and regular rhythm.      Pulses: Normal pulses.      Heart sounds: Normal heart sounds.   Pulmonary:      Effort: Pulmonary effort is normal. No tachypnea or accessory muscle usage.      Breath sounds: Normal breath sounds.   Abdominal:      General: Abdomen is flat. There is no distension.      Palpations: Abdomen is soft.      Tenderness: There is abdominal tenderness in the periumbilical area and suprapubic area. There is no guarding or rebound.   Musculoskeletal:         General: Normal range of motion.      Cervical back: Normal range of motion and neck supple.   Skin:     General: Skin is warm and dry.      Capillary Refill: Capillary refill takes less than 2 seconds.      Coloration: Skin is not cyanotic or pale.   Neurological:      General: No focal deficit present.      Mental Status: She is alert and oriented to person, place, and time.   Psychiatric:         Mood and Affect: Mood normal.         Behavior: Behavior normal.         Thought Content: Thought content normal.         Judgment:  Judgment normal.         Results Reviewed       Procedure Component Value Units Date/Time    Urine Microscopic [[id number]]  (Abnormal) Collected: 04/24/25 1504    Lab Status: Final result Specimen: Urine, Clean Catch Updated: 04/24/25 1541     RBC, UA 10-20 /hpf      WBC, UA Innumerable /hpf      Epithelial Cells Occasional /hpf      Bacteria, UA Occasional /hpf      WBC Clumps Present     Renal Epithelial Cells Present     Transitional Epithelial Cells Present    Urine culture [[id number]] Collected: 04/24/25 1504    Lab Status: In process Specimen: Urine, Clean Catch Updated: 04/24/25 1541    UA w Reflex to Microscopic w Reflex to Culture [[id number]]  (Abnormal) Collected: 04/24/25 1504    Lab Status: Final result Specimen: Urine, Clean Catch Updated: 04/24/25 1527     Color, UA Light Orange     Clarity, UA Turbid     Specific Gravity, UA 1.008     pH, UA 6.0     Leukocytes, UA Large     Nitrite, UA Positive     Protein, UA 50 (1+) mg/dl      Glucose, UA Negative mg/dl      Ketones, UA 20 (1+) mg/dl      Urobilinogen, UA <2.0 mg/dl      Bilirubin, UA Negative     Occult Blood, UA Moderate    Comprehensive metabolic panel [[id number]]  (Abnormal) Collected: 04/24/25 1318    Lab Status: Final result Specimen: Blood from Arm, Right Updated: 04/24/25 1339     Sodium 136 mmol/L      Potassium 3.6 mmol/L      Chloride 101 mmol/L      CO2 27 mmol/L      ANION GAP 8 mmol/L      BUN 8 mg/dL      Creatinine 0.91 mg/dL      Glucose 105 mg/dL      Calcium 8.6 mg/dL      AST 9 U/L      ALT 8 U/L      Alkaline Phosphatase 72 U/L      Total Protein 6.4 g/dL      Albumin 3.6 g/dL      Total Bilirubin 0.49 mg/dL      eGFR 67 ml/min/1.73sq m     Narrative:      National Kid[employer] Disease Foundation guidelines for Chronic Kidney Disease (CKD):     Stage 1 with normal or high GFR (GFR > 90 mL/min/1.73 square meters)    Stage 2 Mild CKD (GFR = 60-89 mL/min/1.73 square meters)    Stage 3A Moderate CKD (GFR = 45-59 mL/min/1.73 square  meters)    Stage 3B Moderate CKD (GFR = 30-44 mL/min/1.73 square meters)    Stage 4 Severe CKD (GFR = 15-29 mL/min/1.73 square meters)    Stage 5 End Stage CKD (GFR <15 mL/min/1.73 square meters)  Note: GFR calculation is accurate only with a steady state creatinine    Lipase [[id number]]  (Normal) Collected: 04/24/25 1318    Lab Status: Final result Specimen: Blood from Arm, Right Updated: 04/24/25 1339     Lipase 14 u/L     CBC and differential [[id number]]  (Abnormal) Collected: 04/24/25 1318    Lab Status: Final result Specimen: Blood from Arm, Right Updated: 04/24/25 1323     WBC 9.40 Thousand/uL      RBC 3.91 Million/uL      Hemoglobin 11.7 g/dL      Hematocrit 35.9 %      MCV 92 fL      MCH 29.9 pg      MCHC 32.6 g/dL      RDW 12.9 %      MPV 8.8 fL      Platelets 227 Thousands/uL      nRBC 0 /100 WBCs      Segmented % 76 %      Immature Grans % 1 %      Lymphocytes % 11 %      Monocytes % 10 %      Eosinophils Relative 1 %      Basophils Relative 1 %      Absolute Neutrophils 7.15 Thousands/µL      Absolute Immature Grans 0.05 Thousand/uL      Absolute Lymphocytes 1.07 Thousands/µL      Absolute Monocytes 0.96 Thousand/µL      Eosinophils Absolute 0.11 Thousand/µL      Basophils Absolute 0.06 Thousands/µL             CT abdomen pelvis wo contrast   Final Interpretation by Rudy Og MD (04/24 1448)      No acute intra-abdominal process. Specifically no evidence of hydronephrosis or nephrolithiasis. There is mild perinephric fat stranding bilaterally, which is nonspecific and may be physiologic in nature. Correlation with urinalysis is recommended to    exclude possibility infection.      Workstation performed: [id number]             Procedures    ED Medication and Procedure Management   Prior to Admission Medications   Prescriptions Last Dose Informant Patient Reported? Taking?   Cetirizine HCl 10 MG CAPS  Self Yes No   Sig: Take 1 tablet by mouth daily at bedtime    Melatonin 10-10 MG TBCR  Self Yes No    Multiple Vitamin (MULTIVITAMIN ADULT PO)  Self Yes No   Sig: Take by mouth in the morning   Vitamin D, Cholecalciferol, 50 MCG (2000 UT) CAPS  Self Yes No   Sig: Take by mouth   estradiol (MENOSTAR) 14 MCG/24HR  Self Yes No   Sig: PLACE 1 PATCH ON THE SKIN ONCE WEEKLY   fexofenadine-pseudoephedrine (Allegra-D Allergy & Congestion)  MG per tablet  Self Yes No   Sig: Take by mouth if needed   fluticasone (FLONASE) 50 mcg/act nasal spray  Self Yes No   Si sprays into each nostril if needed   ibuprofen (MOTRIN) 200 mg tablet   Yes No   Sig: Take 200 mg by mouth every 6 (six) hours as needed for mild pain   oxybutynin (DITROPAN) 5 mg tablet   No No   Sig: Take 1 tablet (5 mg total) by mouth 3 (three) times a day   phenazopyridine (PYRIDIUM) 200 mg tablet   No No   Sig: Take 1 tablet (200 mg total) by mouth 3 (three) times a day with meals   Patient not taking: Reported on 4/15/2025   tamsulosin (FLOMAX) 0.4 mg   No No   Sig: Take 1 capsule (0.4 mg total) by mouth daily with dinner      Facility-Administered Medications: None     Patient's Medications   Discharge Prescriptions    CEFUROXIME (CEFTIN) 500 MG TABLET    Take 1 tablet (500 mg total) by mouth every 12 (twelve) hours for 7 days       Start Date: 2025 End Date: 2025       Order Dose: 500 mg       Quantity: 14 tablet    Refills: 0     No discharge procedures on file.  ED SEPSIS DOCUMENTATION   Time reflects when diagnosis was documented in both MDM as applicable and the Disposition within this note       Time User Action Codes Description Comment    2025  3:38 PM Roseanne Hector Add [N39.0] UTI (urinary tract infection)                  Roseanne Hector PA-C  [phone]

## 2025-04-26 ENCOUNTER — RESULTS FOLLOW-UP (OUTPATIENT)
Dept: EMERGENCY DEPT | Facility: HOSPITAL | Age: 65
End: 2025-04-26

## 2025-04-26 LAB — BACTERIA UR CULT: ABNORMAL

## 2025-05-06 NOTE — PROGRESS NOTES
Name: Karen Goins      : 1960      MRN: [id number]  Encounter Provider: MORGAN Hernandez  Encounter Date: 2025   Encounter department: [facility]OLOG[facility] [place]  :  Assessment & Plan  UTI symptoms  Patient having dysuria and right-sided flank pain, urinary frequency.  Patient denies hematuria  Reviewed last urine culture and was recently placed on cefuroxime.  Prescribed ciprofloxacin for 7 days/Pyridium as needed  Urine culture and microanalysis sent out we will follow-up once resulted.  Patient aware of ED precautions.  Orders:    POCT urine dip auto non-scope    ciprofloxacin (CIPRO) 500 mg tablet; Take 1 tablet (500 mg total) by mouth every 12 (twelve) hours for 7 days    phenazopyridine (PYRIDIUM) 100 mg tablet; Take 1 tablet (100 mg total) by mouth 3 (three) times a day as needed for bladder spasms    History of renal calculi  Patient has a history of kidney stones and bladder stones.  Discussed kidney stone diet handout given on AVS.  Ultrasound ordered for 1 year prior to appointment.  Discussed adequate hydration  Orders:    US kidney and bladder; Future        History of Present Illness   Karen Goins is a 65 y.o. female who presents for 4-week follow-up.  Patient had procedure lithotripsy a holmium laser cystoscopy with Dr. Holman on 25. Was seen in ED on 2025 for positive UTI was discharged on Cefuroxime .  Patient completed antibiotic and noticed 3 days ago started having right flank pain, urinary frequency.  Patient states this similar to how she felt previously for UTI.    Review of Systems   Genitourinary:  Positive for flank pain and frequency. Negative for dysuria, hematuria, pelvic pain and urgency.          Objective   There were no vitals taken for this visit.    Physical Exam  Vitals reviewed.   Constitutional:       Appearance: Normal appearance.   Cardiovascular:      Rate and Rhythm: Normal rate.   Pulmonary:      Effort: Pulmonary effort  "is normal.   Abdominal:      Tenderness: There is no right CVA tenderness or left CVA tenderness.   Skin:     General: Skin is warm.   Neurological:      General: No focal deficit present.      Mental Status: She is oriented to person, place, and time.   Psychiatric:         Mood and Affect: Mood normal.         Behavior: Behavior normal.           Results   No results found for: \"PSA\"  Lab Results   Component Value Date    GLUCOSE 88 11/10/2015    CALCIUM 8.6 04/24/2025     11/10/2015    K 3.6 04/24/2025    CO2 27 04/24/2025     04/24/2025    BUN 8 04/24/2025    CREATININE 0.91 04/24/2025     Lab Results   Component Value Date    WBC 9.40 04/24/2025    HGB 11.7 04/24/2025    HCT 35.9 04/24/2025    MCV 92 04/24/2025     04/24/2025       Office Urine Dip  No results found for this or any previous visit (from the past hour).      "

## 2025-05-07 ENCOUNTER — OFFICE VISIT (OUTPATIENT)
Dept: UROLOGY | Facility: MEDICAL CENTER | Age: 65
End: 2025-05-07
Payer: COMMERCIAL

## 2025-05-07 ENCOUNTER — TELEPHONE (OUTPATIENT)
Age: 65
End: 2025-05-07

## 2025-05-07 VITALS
DIASTOLIC BLOOD PRESSURE: 86 MMHG | WEIGHT: 163 LBS | OXYGEN SATURATION: 99 % | SYSTOLIC BLOOD PRESSURE: 140 MMHG | HEART RATE: 80 BPM | BODY MASS INDEX: 25.58 KG/M2 | HEIGHT: 67 IN

## 2025-05-07 DIAGNOSIS — R39.9 UTI SYMPTOMS: Primary | ICD-10-CM

## 2025-05-07 DIAGNOSIS — Z87.442 HISTORY OF RENAL CALCULI: ICD-10-CM

## 2025-05-07 LAB
BACTERIA UR QL AUTO: ABNORMAL /HPF
BILIRUB UR QL STRIP: NEGATIVE
CLARITY UR: CLEAR
COLOR UR: COLORLESS
GLUCOSE UR STRIP-MCNC: NEGATIVE MG/DL
HGB UR QL STRIP.AUTO: NEGATIVE
KETONES UR STRIP-MCNC: NEGATIVE MG/DL
LEUKOCYTE ESTERASE UR QL STRIP: ABNORMAL
NITRITE UR QL STRIP: NEGATIVE
NON-SQ EPI CELLS URNS QL MICRO: ABNORMAL /HPF
PH UR STRIP.AUTO: 6 [PH]
PROT UR STRIP-MCNC: NEGATIVE MG/DL
RBC #/AREA URNS AUTO: ABNORMAL /HPF
SL AMB  POCT GLUCOSE, UA: ABNORMAL
SL AMB LEUKOCYTE ESTERASE,UA: ABNORMAL
SL AMB POCT BILIRUBIN,UA: ABNORMAL
SL AMB POCT BLOOD,UA: ABNORMAL
SL AMB POCT CLARITY,UA: CLEAR
SL AMB POCT COLOR,UA: YELLOW
SL AMB POCT KETONES,UA: ABNORMAL
SL AMB POCT NITRITE,UA: ABNORMAL
SL AMB POCT PH,UA: 6
SL AMB POCT SPECIFIC GRAVITY,UA: 1.01
SL AMB POCT URINE PROTEIN: ABNORMAL
SL AMB POCT UROBILINOGEN: 0.2
SP GR UR STRIP.AUTO: 1.01 (ref 1–1.03)
UROBILINOGEN UR STRIP-ACNC: <2 MG/DL
WBC #/AREA URNS AUTO: ABNORMAL /HPF

## 2025-05-07 PROCEDURE — 87186 SC STD MICRODIL/AGAR DIL: CPT

## 2025-05-07 PROCEDURE — 81001 URINALYSIS AUTO W/SCOPE: CPT

## 2025-05-07 PROCEDURE — 87077 CULTURE AEROBIC IDENTIFY: CPT

## 2025-05-07 PROCEDURE — 87086 URINE CULTURE/COLONY COUNT: CPT

## 2025-05-07 PROCEDURE — 99214 OFFICE O/P EST MOD 30 MIN: CPT

## 2025-05-07 PROCEDURE — 81003 URINALYSIS AUTO W/O SCOPE: CPT

## 2025-05-07 RX ORDER — CIPROFLOXACIN 500 MG/1
500 TABLET, FILM COATED ORAL EVERY 12 HOURS SCHEDULED
Qty: 14 TABLET | Refills: 0 | Status: SHIPPED | OUTPATIENT
Start: 2025-05-07 | End: 2025-05-14

## 2025-05-07 RX ORDER — PHENAZOPYRIDINE HYDROCHLORIDE 100 MG/1
100 TABLET, FILM COATED ORAL 3 TIMES DAILY PRN
Qty: 10 TABLET | Refills: 0 | Status: SHIPPED | OUTPATIENT
Start: 2025-05-07

## 2025-05-07 NOTE — ASSESSMENT & PLAN NOTE
Patient has a history of kidney stones and bladder stones.  Discussed kidney stone diet handout given on AVS.  Ultrasound ordered for 1 year prior to appointment.  Discussed adequate hydration  Orders:    US kidney and bladder; Future

## 2025-05-07 NOTE — TELEPHONE ENCOUNTER
PA for PYRIDIUM SUBMITTED to [facility]    via      [x][facility]-Case ID # [id number]       [x]PA sent as URGENT    All office notes, labs and other pertaining documents and studies sent. Clinical questions answered. Awaiting determination from insurance company.     Turnaround time for your insurance to make a decision on your Prior Authorization can take 7-21 business days.

## 2025-05-07 NOTE — PATIENT INSTRUCTIONS
DRINK 3 QUARTS (96 Oz.) LIQUIDS EACH DAY - ALL LIQUIDS COUNT       ** THESE FOODS ARE HIGH IN [place] - TRY TO LIMIT HOW MUCH OF THESE YOU EAT:  Coke and Pepsi  Nuts  Dark Leafy Greens:     Spinach and Kale, Rhubarb, Chard  Asparagus  Beets  Sweet potatoes   Blueberries, Strawberries   Dark tea (Green tea is okay)  Tofu    ADD LEMON JUICE 3 OZ. DAILY - Fresh squeezed or lemon juice concentrate - Not MinuteMaid, [place], Crystal Lite, etc.        ** Recipe for SOMMER'S OLDE TYME LEMONADE:         One ounce of lemon juice, glass of water, sweetener of your choice    Coffee is okay!  Cranberry juice is good to prevent infections, but does not help for stones.

## 2025-05-09 ENCOUNTER — RESULTS FOLLOW-UP (OUTPATIENT)
Dept: UROLOGY | Facility: MEDICAL CENTER | Age: 65
End: 2025-05-09

## 2025-05-09 LAB — BACTERIA UR CULT: ABNORMAL

## 2025-05-09 NOTE — TELEPHONE ENCOUNTER
LVM informing patient UC was positive and the abx she is currently on is the correct one to clear the bacteria she has.  Complete full course of Cipro.  Left office #[phone].

## 2025-06-25 ENCOUNTER — OFFICE VISIT (OUTPATIENT)
Dept: FAMILY MEDICINE CLINIC | Facility: CLINIC | Age: 65
End: 2025-06-25
Payer: COMMERCIAL

## 2025-06-25 VITALS
HEIGHT: 66 IN | BODY MASS INDEX: 25.81 KG/M2 | DIASTOLIC BLOOD PRESSURE: 74 MMHG | WEIGHT: 160.6 LBS | SYSTOLIC BLOOD PRESSURE: 122 MMHG | HEART RATE: 96 BPM

## 2025-06-25 DIAGNOSIS — K21.9 GASTROESOPHAGEAL REFLUX DISEASE WITHOUT ESOPHAGITIS: ICD-10-CM

## 2025-06-25 DIAGNOSIS — L71.9 ROSACEA: ICD-10-CM

## 2025-06-25 DIAGNOSIS — Z87.442 HISTORY OF RENAL CALCULI: ICD-10-CM

## 2025-06-25 DIAGNOSIS — E78.2 MIXED HYPERLIPIDEMIA: ICD-10-CM

## 2025-06-25 DIAGNOSIS — N21.0 BLADDER STONE: ICD-10-CM

## 2025-06-25 DIAGNOSIS — Z12.31 ENCOUNTER FOR SCREENING MAMMOGRAM FOR BREAST CANCER: Primary | ICD-10-CM

## 2025-06-25 DIAGNOSIS — M81.0 AGE-RELATED OSTEOPOROSIS WITHOUT CURRENT PATHOLOGICAL FRACTURE: ICD-10-CM

## 2025-06-25 PROCEDURE — 99214 OFFICE O/P EST MOD 30 MIN: CPT | Performed by: FAMILY MEDICINE

## 2025-06-25 NOTE — ASSESSMENT & PLAN NOTE
Workup for secondary causes (-)  Dexa 9/2024  Seeing rheumatology  Taking vit D  Unable to take bisphosphonate de to KIERA  Got reclast  Advise weight bearing activities

## 2025-06-25 NOTE — PROGRESS NOTES
Name: Karen Goins      : 1960      MRN: [id number]  Encounter Provider: Jaqueline Trevino DO  Encounter Date: 2025   Encounter department: [facility]  :  Assessment & Plan  Encounter for screening mammogram for breast cancer    Orders:    Mammo screening bilateral w 3d and cad; Future    Gastroesophageal reflux disease without esophagitis  stable       Age-related osteoporosis without current pathological fracture  Workup for secondary causes (-)  Dexa 2024  Seeing rheumatology  Taking vit D  Unable to take bisphosphonate de to GERD  Got reclast  Advise weight bearing activities             Bladder stone  Pt is following diet to prevent calcium oxalate stones  And is keeping better hydrated which is also helping with headaches.         History of renal calculi         Mixed hyperlipidemia         Rosacea       pt would like to lose about 10 #         Depression Screening and Follow-up Plan: Patient was screened for depression during today's encounter. They screened negative with a PHQ-2 score of 0.        History of Present Illness   HPI  Here for routine visit.  Since last visit - had workup with urology   Had renal calculi - lithotripsy and cystoscopy  Treated for UTI  And multiple CT scans    Review of Systems   Constitutional:  Negative for fatigue and fever.        Overall feeling much better  Back to walking  Normal energy.     Eyes:  Negative for visual disturbance.   Respiratory:  Negative for cough, shortness of breath and wheezing.    Cardiovascular:  Negative for chest pain and palpitations.   Gastrointestinal:  Negative for abdominal pain, blood in stool, constipation, diarrhea, nausea and vomiting.        GERD controlled.     Genitourinary:  Negative for dysuria.        Had recent UTI and treated  No gross hematuria.     Psychiatric/Behavioral:  Negative for dysphoric mood, self-injury and suicidal ideas. The patient is not nervous/anxious.   "      Objective   /74   Pulse 96   Ht 5' 6.34\" (1.685 m)   Wt 72.8 kg (160 lb 9.6 oz)   BMI 25.66 kg/m²      Physical Exam  Vitals and nursing note reviewed.   Constitutional:       General: She is not in acute distress.     Appearance: Normal appearance. She is not ill-appearing or toxic-appearing.   Neck:      Vascular: No carotid bruit.     Cardiovascular:      Rate and Rhythm: Normal rate and regular rhythm.      Pulses: Normal pulses.      Heart sounds: Normal heart sounds. No murmur heard.  Pulmonary:      Effort: Pulmonary effort is normal. No respiratory distress.      Breath sounds: Normal breath sounds. No wheezing, rhonchi or rales.   Abdominal:      General: There is no distension.      Palpations: Abdomen is soft. There is no mass.      Tenderness: There is no abdominal tenderness. There is no guarding or rebound.      Comments: No epigastric or suprapubic tenderness       Musculoskeletal:      Cervical back: Neck supple. No tenderness.      Right lower leg: No edema.      Left lower leg: No edema.   Lymphadenopathy:      Cervical: No cervical adenopathy.     Skin:     General: Skin is warm.      Coloration: Skin is not jaundiced.     Neurological:      General: No focal deficit present.      Mental Status: She is alert and oriented to person, place, and time.     Psychiatric:         Mood and Affect: Mood normal.         Behavior: Behavior normal.         Thought Content: Thought content normal.         Judgment: Judgment normal.         Answers submitted by the patient for this visit:  Annual Physical (Submitted on 6/24/2025)  Diet/Nutrition choices: no special diet  Exercise choices: walking, 3-4 times a week on average  Sleep choices: 7-8 hours of sleep on average  Hearing choices: normal hearing bilateral ears  Vision choices: most recent eye exam > 1 year ago, wears glasses  Dental choices: regular dental visits, brushes teeth three times daily, floss regularly  Do you currently have an " OB/GYN provider that you routinely follow with?: Yes  Menopausal status: postmenopausal  Any history of sexual transmitted disease/infection?: No  Contraception: hysterectomy  Do you have an advance directive/living will?: Yes  Do you have a durable power of  (POA)?: Yes

## 2025-07-31 ENCOUNTER — OFFICE VISIT (OUTPATIENT)
Dept: FAMILY MEDICINE CLINIC | Facility: CLINIC | Age: 65
End: 2025-07-31
Payer: COMMERCIAL

## 2025-07-31 VITALS
SYSTOLIC BLOOD PRESSURE: 118 MMHG | OXYGEN SATURATION: 99 % | TEMPERATURE: 96.2 F | WEIGHT: 162.4 LBS | DIASTOLIC BLOOD PRESSURE: 74 MMHG | BODY MASS INDEX: 25.94 KG/M2 | HEART RATE: 74 BPM

## 2025-07-31 DIAGNOSIS — N64.4 BREAST PAIN, LEFT: Primary | ICD-10-CM

## 2025-07-31 PROCEDURE — 99214 OFFICE O/P EST MOD 30 MIN: CPT | Performed by: FAMILY MEDICINE

## (undated) DEVICE — Device

## (undated) DEVICE — BAG URINE DRAINAGE 2000ML ANTI RFLX LF

## (undated) DEVICE — GLOVE SRG BIOGEL 7.5

## (undated) DEVICE — REM POLYHESIVE ADULT PATIENT RETURN ELECTRODE: Brand: VALLEYLAB

## (undated) DEVICE — LUBRICANT JELLY SURGILUBE TUBE 2OZ FLIP TOP

## (undated) DEVICE — PREMIUM DRY TRAY LF: Brand: MEDLINE INDUSTRIES, INC.

## (undated) DEVICE — 4-PORT MANIFOLD: Brand: NEPTUNE 2

## (undated) DEVICE — PACK TUR

## (undated) DEVICE — TUBING SUCTION 5MM X 12 FT

## (undated) DEVICE — CATH URETERAL 5FR X 70 CM FLEX TIP POLYUR BARD

## (undated) DEVICE — UROLOGIC DRAIN BAG: Brand: UNBRANDED

## (undated) DEVICE — FIBER STD QUANTA 550 MICRON

## (undated) DEVICE — SINGLE PORT MANIFOLD: Brand: NEPTUNE 2

## (undated) DEVICE — SCD SEQUENTIAL COMPRESSION COMFORT SLEEVE MEDIUM KNEE LENGTH: Brand: KENDALL SCD

## (undated) DEVICE — EXIDINE 4 PCT

## (undated) DEVICE — INVIEW CLEAR LEGGINGS: Brand: CONVERTORS